# Patient Record
Sex: FEMALE | Race: WHITE | NOT HISPANIC OR LATINO | Employment: FULL TIME | URBAN - METROPOLITAN AREA
[De-identification: names, ages, dates, MRNs, and addresses within clinical notes are randomized per-mention and may not be internally consistent; named-entity substitution may affect disease eponyms.]

---

## 2017-01-11 RX ORDER — BUPROPION HYDROCHLORIDE 150 MG/1
TABLET ORAL
Qty: 90 TABLET | Refills: 3 | OUTPATIENT
Start: 2017-01-11

## 2017-01-11 RX ORDER — LOSARTAN POTASSIUM AND HYDROCHLOROTHIAZIDE 12.5; 1 MG/1; MG/1
TABLET ORAL
Qty: 30 TABLET | Refills: 6 | OUTPATIENT
Start: 2017-01-11

## 2017-01-13 ENCOUNTER — TELEPHONE (OUTPATIENT)
Dept: INTERNAL MEDICINE | Facility: CLINIC | Age: 57
End: 2017-01-13

## 2017-01-13 NOTE — TELEPHONE ENCOUNTER
LMOVM     Pt needs to schedule appt before we can refill medication   She was last seen 12/2015   . Per Dr Melton

## 2017-01-16 PROBLEM — K57.92 ACUTE DIVERTICULITIS: Status: ACTIVE | Noted: 2017-01-16

## 2017-01-17 ENCOUNTER — OFFICE VISIT (OUTPATIENT)
Dept: INTERNAL MEDICINE | Facility: CLINIC | Age: 57
End: 2017-01-17

## 2017-01-17 VITALS
BODY MASS INDEX: 29.42 KG/M2 | SYSTOLIC BLOOD PRESSURE: 144 MMHG | TEMPERATURE: 97.6 F | RESPIRATION RATE: 20 BRPM | HEART RATE: 96 BPM | WEIGHT: 171.38 LBS | DIASTOLIC BLOOD PRESSURE: 88 MMHG

## 2017-01-17 DIAGNOSIS — F41.1 GENERALIZED ANXIETY DISORDER: ICD-10-CM

## 2017-01-17 DIAGNOSIS — E03.9 ACQUIRED HYPOTHYROIDISM: ICD-10-CM

## 2017-01-17 DIAGNOSIS — Z11.59 NEED FOR HEPATITIS C SCREENING TEST: ICD-10-CM

## 2017-01-17 DIAGNOSIS — K21.9 GASTROESOPHAGEAL REFLUX DISEASE WITHOUT ESOPHAGITIS: ICD-10-CM

## 2017-01-17 DIAGNOSIS — I10 ESSENTIAL HYPERTENSION: Primary | ICD-10-CM

## 2017-01-17 DIAGNOSIS — E78.49 OTHER HYPERLIPIDEMIA: ICD-10-CM

## 2017-01-17 DIAGNOSIS — Z12.39 SCREENING FOR BREAST CANCER: ICD-10-CM

## 2017-01-17 PROBLEM — R74.8 ABNORMAL LIVER ENZYMES: Status: ACTIVE | Noted: 2017-01-17

## 2017-01-17 PROBLEM — R91.8 LUNG MASS: Status: ACTIVE | Noted: 2017-01-17

## 2017-01-17 PROBLEM — K57.30 DIVERTICULOSIS OF LARGE INTESTINE: Status: ACTIVE | Noted: 2017-01-17

## 2017-01-17 PROBLEM — E78.5 HYPERLIPIDEMIA: Status: ACTIVE | Noted: 2017-01-17

## 2017-01-17 PROBLEM — F41.9 ANXIETY DISORDER: Status: ACTIVE | Noted: 2017-01-17

## 2017-01-17 LAB
ALBUMIN SERPL-MCNC: 4.4 G/DL (ref 3.2–4.8)
ALBUMIN/GLOB SERPL: 1.4 G/DL (ref 1.5–2.5)
ALP SERPL-CCNC: 44 U/L (ref 25–100)
ALT SERPL W P-5'-P-CCNC: 82 U/L (ref 7–40)
ANION GAP SERPL CALCULATED.3IONS-SCNC: 17 MMOL/L (ref 3–11)
AST SERPL-CCNC: 61 U/L (ref 0–33)
BILIRUB SERPL-MCNC: 0.6 MG/DL (ref 0.3–1.2)
BUN BLD-MCNC: 18 MG/DL (ref 9–23)
BUN/CREAT SERPL: 25.7 (ref 7–25)
CALCIUM SPEC-SCNC: 9.9 MG/DL (ref 8.7–10.4)
CHLORIDE SERPL-SCNC: 104 MMOL/L (ref 99–109)
CHOLEST SERPL-MCNC: 284 MG/DL
CLARITY, POC: ABNORMAL
CO2 SERPL-SCNC: 25 MMOL/L (ref 20–31)
COLOR UR: YELLOW
CREAT BLD-MCNC: 0.7 MG/DL (ref 0.6–1.3)
EXPIRATION DATE: ABNORMAL
EXPIRATION DATE: NORMAL
GFR SERPL CREATININE-BSD FRML MDRD: 87 ML/MIN/1.73
GLOBULIN UR ELPH-MCNC: 3.1 GM/DL
GLUCOSE BLD-MCNC: 85 MG/DL (ref 70–100)
GLUCOSE UR STRIP-MCNC: NEGATIVE MG/DL
HCV AB SER DONR QL: NORMAL
HDLC SERPL-MCNC: 81 MG/DL
KETONES UR QL: NEGATIVE
LDLC SERPL CALC-MCNC: 168 MG/DL
LDLC/HDLC SERPL: 2.1 {RATIO}
LEUKOCYTE EST, POC: ABNORMAL
Lab: ABNORMAL
Lab: NORMAL
NITRITE UR-MCNC: NEGATIVE MG/ML
PH UR: 6 [PH] (ref 5–8)
POTASSIUM BLD-SCNC: 3.8 MMOL/L (ref 3.5–5.5)
PROT SERPL-MCNC: 7.5 G/DL (ref 5.7–8.2)
PROT UR STRIP-MCNC: NEGATIVE MG/DL
PROT/CREAT UR: 200 MG/G CREA
RBC # UR STRIP: ABNORMAL /UL
SODIUM BLD-SCNC: 146 MMOL/L (ref 132–146)
SP GR UR: 1.02 (ref 1–1.03)
T4 FREE SERPL-MCNC: 1.29 NG/DL (ref 0.89–1.76)
TRIGL SERPL-MCNC: 176 MG/DL
TSH SERPL DL<=0.05 MIU/L-ACNC: 2.63 MIU/ML (ref 0.35–5.35)

## 2017-01-17 PROCEDURE — 80053 COMPREHEN METABOLIC PANEL: CPT | Performed by: INTERNAL MEDICINE

## 2017-01-17 PROCEDURE — 81002 URINALYSIS NONAUTO W/O SCOPE: CPT | Performed by: INTERNAL MEDICINE

## 2017-01-17 PROCEDURE — 84439 ASSAY OF FREE THYROXINE: CPT | Performed by: INTERNAL MEDICINE

## 2017-01-17 PROCEDURE — 84443 ASSAY THYROID STIM HORMONE: CPT | Performed by: INTERNAL MEDICINE

## 2017-01-17 PROCEDURE — 80061 LIPID PANEL: CPT | Performed by: INTERNAL MEDICINE

## 2017-01-17 PROCEDURE — 86803 HEPATITIS C AB TEST: CPT | Performed by: INTERNAL MEDICINE

## 2017-01-17 PROCEDURE — 99214 OFFICE O/P EST MOD 30 MIN: CPT | Performed by: INTERNAL MEDICINE

## 2017-01-17 PROCEDURE — 36415 COLL VENOUS BLD VENIPUNCTURE: CPT | Performed by: INTERNAL MEDICINE

## 2017-01-17 RX ORDER — LEVOTHYROXINE SODIUM 0.07 MG/1
75 TABLET ORAL DAILY
Qty: 90 TABLET | Refills: 3 | Status: SHIPPED | OUTPATIENT
Start: 2017-01-17 | End: 2018-01-03 | Stop reason: SDUPTHER

## 2017-01-17 RX ORDER — BUPROPION HYDROCHLORIDE 150 MG/1
150 TABLET ORAL EVERY MORNING
Qty: 90 TABLET | Refills: 3 | Status: SHIPPED | OUTPATIENT
Start: 2017-01-17 | End: 2018-02-12 | Stop reason: SDUPTHER

## 2017-01-17 RX ORDER — LOSARTAN POTASSIUM AND HYDROCHLOROTHIAZIDE 12.5; 1 MG/1; MG/1
1 TABLET ORAL DAILY
Qty: 90 TABLET | Refills: 3 | Status: SHIPPED | OUTPATIENT
Start: 2017-01-17 | End: 2018-02-12 | Stop reason: SDUPTHER

## 2017-01-17 NOTE — PROGRESS NOTES
Subjective       Roz Randolph is a 56 y.o. female.     Chief Complaint   Patient presents with   • Hypertension     1 year follow up   fasting        History of Present Illness     6 month follow up for chronic medical conditons fasting  (last seen for routine follow up 9/4/15).     History of Present Illness  Primary Care Cardiac Diagnostic Constellation: The patient is here today for a follow-up visit.     Her Hypertension is primary, but stable.   The patient is adherent with her medication regimen. She denies medication side effects.   Medication(s): Losartan.   Her Hyperlipidemia has been unstable. Her LDL goal is 130 mg/dL, last LDL was 192 mg/dL and .   Medication(s): None.     Interval Events: The patient occasionally checksher blood pressure at home, and it has been normal.    .   Symptoms: Denies chest pain, denies intermittent leg claudication, denies dyspnea, denies lower extremity edema, denies exercise intolerance, denies visual impairment, denies muscle pain and denies muscle weakness.   Associated symptoms include stable fatigue and a recent 2 pounds weight gain,  but no palpitations, no syncope, no headache, no orthopnea, no PND, no polydipsia, no polyuria, no focal neurologic deficits, and no memory loss.    Lifestyle and Disease Management: Diet: She consumes a diverse and healthy diet. Diet problems: high in sugar. Weight Issues: She has weight concerns. Exercise: She exercises intermittently. She exercises 2 times per week for 30 minutes per session. Exercise includes walking.  Also golfs on weekends with nice weather.  Smoking: She does not use tobacco.     Anxiety Disorder (Follow-Up): The patient is being seen for follow-up of Anxiety. The patient reports doing well.   She has no comorbid illnesses.   Interval Events:  She has had no significant interval events.   Interval symptoms:  stable anxiety, denies difficulty concentrating, denies restlessness, denies panic attacks, denies  sleep disruption and denies depression.   Associated symptoms: no suicidal ideation.   Medication(s): Wellbutrin XL.   Medications:  the patient is adherent to her medication regimen, but she denies medication side effects.     Gastroesophageal Reflux Disease (Brief): The patient is being seen for a routine clinic follow-up of Gastroesophageal Reflux Disease.   Symptoms:  No abdominal pain, no heartburn, no acid regurgitation, no nausea, no vomiting, no sore throat, no dysphagia, no odynophagia, no hematemesis,  and no melena    Previous Evaluation: EGD 12/21/15 (small hiatal hernia, mild duodenitis, and concentric espohageal rings suggestive of esophsgitis- path c/w mild chronic esophagitis w/ increased eosinophils suggestive of reflux (Fadi).   Associated symptoms:  no anorexia, no early satiety, no bloating, no belching, no weight loss, no hoarseness, no cough and no wheezing.   Current treatment include Tums prn.     Hypothyroidism (Follow-Up): The patient is being seen for follow-up of Hypothyroidism.   The patient reports no change in the condition. She has had no significant interval events.   Interval symptoms:  2 pound weight gain, but denies cold intolerance, denies fatigue, denies weakness, denies constipation, denies dyspnea on exertion, denies trouble concentrating,  hair loss,  and dry skin.   Associated symptoms: no myalgias, no arthralgias, and no paresthesias.   Medications include levothyroxine (Synthroid).   Medications:  the patient is adherent to her medication regimen, but she denies medication side effects.            Current Outpatient Prescriptions on File Prior to Visit   Medication Sig Dispense Refill   • [DISCONTINUED] buPROPion XL (WELLBUTRIN XL) 150 MG 24 hr tablet TAKE ONE TABLET BY MOUTH EVERY DAY 30 tablet 0   • [DISCONTINUED] levothyroxine (SYNTHROID, LEVOTHROID) 75 MCG tablet TAKE ONE TABLET BY MOUTH EVERY DAY 30 tablet 5   • [DISCONTINUED] losartan-hydrochlorothiazide (HYZAAR)  "100-12.5 MG per tablet TAKE ONE TABLET BY MOUTH EVERY DAY 30 tablet 6   • [DISCONTINUED] sulfamethoxazole-trimethoprim (BACTRIM DS,SEPTRA DS) 800-160 MG per tablet Take 1 tablet by mouth 2 (Two) Times a Day. 20 tablet 0     No current facility-administered medications on file prior to visit.          The following portions of the patient's history were reviewed and updated as appropriate: allergies, current medications, past family history, past medical history, past social history, past surgical history and problem list.    Review of Systems   Constitutional: Positive for fatigue. Negative for unexpected weight change.   HENT: Negative for sore throat and voice change.    Eyes: Negative for visual disturbance.   Respiratory: Negative for cough, shortness of breath and wheezing.    Cardiovascular: Negative for chest pain, palpitations and leg swelling.        No PORTILLO, orthopnea, or claudication.   Gastrointestinal: Negative for abdominal pain, blood in stool, constipation, diarrhea, nausea and vomiting.        Complains of \"acid reflux\", resolves with Tums. No heartburn, dysphagia, odynophagia, belching, or bloating.  No melena.   Endocrine: Negative for cold intolerance, heat intolerance, polydipsia and polyuria.   Musculoskeletal: Negative for arthralgias and myalgias.   Neurological: Negative for dizziness, syncope, light-headedness and headaches.        No memory issues. No paresthesias.   Psychiatric/Behavioral: Negative for decreased concentration, sleep disturbance and suicidal ideas. The patient is nervous/anxious (stable).         Denies depression.         Objective       Blood pressure 144/88, pulse 96, temperature 97.6 °F (36.4 °C), temperature source Temporal Artery , resp. rate 20, weight 171 lb 6 oz (77.7 kg).      Physical Exam   Constitutional:   Overweight.   Neck: Normal range of motion. Neck supple. Carotid bruit is not present. No thyromegaly present.   Cardiovascular: Normal rate, regular " rhythm, normal heart sounds and intact distal pulses.  Exam reveals no gallop and no friction rub.    No murmur heard.  No peripheral edema.   Pulmonary/Chest: Effort normal and breath sounds normal.   Abdominal: Soft. Bowel sounds are normal. She exhibits no distension, no abdominal bruit and no mass. There is no hepatosplenomegaly. There is no tenderness.   Psychiatric: She has a normal mood and affect.   Nursing note and vitals reviewed.       Results for orders placed or performed in visit on 01/17/17   POC Lipid Panel   Result Value Ref Range    Total Cholesterol 284 mg/dL    Triglycerides 176 mg/dL    HDL Cholesterol 81 mg/dL    LDL Cholesterol  168 mg/dL    Lot Number [id number]     Expiration Date 8-31-17     LDL/HDL Ratio 2.10    POC Urinalysis Dipstick, Multipro   Result Value Ref Range    Color Yellow Yellow, Straw, Dark Yellow, María Elena    Clarity, UA Hazy (A) Clear    Glucose, UA Negative Negative, 1000 mg/dL (3+) mg/dL    Ketones, UA Negative Negative    Specific Gravity  1.020 1.005 - 1.030    Blood, UA Trace (A) Negative    pH, Urine 6.0 5.0 - 8.0    Protein, POC Negative Negative mg/dL    Leukocytes Trace (A) Negative    Nitrite, UA Negative Negative    Protein/Creatinine Ratio, Urine 200.0 mg/G Crea    Lot Number [id number]     Expiration Date 5-17          Assessment / Plan:    Diagnoses and all orders for this visit:    Essential hypertension  -     Comprehensive Metabolic Panel  -     POC Urinalysis Dipstick, Multipro    Other hyperlipidemia  -     POC Lipid Panel    Gastroesophageal reflux disease without esophagitis    Generalized anxiety disorder    Screening for breast cancer  -     Mammo Screening Bilateral With CAD; Future    Need for hepatitis C screening test  -     Hepatitis C Antibody    Acquired hypothyroidism  -     TSH  -     T4, Free    Return in about 6 months (around 7/17/2017) for Recheck-HTN fasting.

## 2017-01-17 NOTE — MR AVS SNAPSHOT
Roz Randolph   1/17/2017 10:30 AM   Office Visit    Provider:  Maia Melton MD   Department:  [facility]   Dept Phone:  [phone]                Your Full Care Plan              Today's Medication Changes          These changes are accurate as of: 1/17/17 11:59 AM.  If you have any questions, ask your nurse or doctor.               Medication(s)that have changed:     buPROPion  MG 24 hr tablet   Commonly known as:  WELLBUTRIN XL   Take 1 tablet by mouth Every Morning.   What changed:  See the new instructions.   Changed by:  Maia Melton MD       levothyroxine 75 MCG tablet   Commonly known as:  SYNTHROID, LEVOTHROID   Take 1 tablet by mouth Daily.   What changed:  See the new instructions.   Changed by:  Maia Melton MD       losartan-hydrochlorothiazide 100-12.5 MG per tablet   Commonly known as:  HYZAAR   Take 1 tablet by mouth Daily.   What changed:  See the new instructions.   Changed by:  Maia Melton MD         Stop taking medication(s)listed here:     sulfamethoxazole-trimethoprim 800-160 MG per tablet   Commonly known as:  BACTRIM DS,SEPTRA DS   Stopped by:  Maia Melton MD                Where to Get Your Medications      These medications were sent to [facility] - [address] [phone]  - [phone] [address] [address]     Phone:  [phone]     buPROPion  MG 24 hr tablet    levothyroxine 75 MCG tablet    losartan-hydrochlorothiazide 100-12.5 MG per tablet                  Your Updated Medication List          This list is accurate as of: 1/17/17 11:59 AM.  Always use your most recent med list.                buPROPion  MG 24 hr tablet   Commonly known as:  WELLBUTRIN XL   Take 1 tablet by mouth Every Morning.       levothyroxine 75 MCG tablet   Commonly known as:  SYNTHROID, LEVOTHROID   Take 1 tablet by mouth Daily.       losartan-hydrochlorothiazide 100-12.5 MG per tablet   Commonly known as:  HYZAAR   Take 1 tablet by mouth Daily.               We Performed the Following     Comprehensive Metabolic Panel     Hepatitis C Antibody     POC Lipid Panel     POC Urinalysis Dipstick, Multipro     T4, Free     TSH       You Were Diagnosed With        Codes Comments    Essential hypertension    -  Primary ICD-10-CM: I10  ICD-9-CM: 401.9     Other hyperlipidemia     ICD-10-CM: E78.4  ICD-9-CM: 272.4     Gastroesophageal reflux disease without esophagitis     ICD-10-CM: K21.9  ICD-9-CM: 530.81     Generalized anxiety disorder     ICD-10-CM: F41.1  ICD-9-CM: 300.02     Screening for breast cancer     ICD-10-CM: Z12.39  ICD-9-CM: V76.10     Need for hepatitis C screening test     ICD-10-CM: Z11.59  ICD-9-CM: V73.89     Acquired hypothyroidism     ICD-10-CM: E03.9  ICD-9-CM: 244.9       Instructions     None    Patient Instructions History      [employer] Signup     [email or web address] allows you to send messages to your doctor, view your test results, renew your prescriptions, schedule appointments, and more. To sign up, go to [email or web address] and click on the Sign Up Now link in the New User? box. Enter your [employer] Activation Code exactly as it appears below along with the last four digits of your Social Security Number and your Date of Birth () to complete the sign-up process. If you do not sign up before the expiration date, you must request a new code.    [employer] Activation Code: [email or web address]  Expires: 2017  5:36 AM    If you have questions, you can email [email or web address] or call [phone] to talk to our [employer] staff. Remember, [employer] is NOT to be used for urgent needs. For medical emergencies, dial 911.               Other Info from Your Visit           Allergies     Augmentin [Amoxicillin-pot Clavulanate]      Morphine And Related  Rash    Tetracyclines & Related Allergy Rash, GI Intolerance       Reason for Visit     Hypertension 1 year follow up   fasting       Vital Signs     Blood Pressure Pulse Temperature Respirations Weight Body Mass Index    144/88 (BP Location: Right arm) 96 97.6 °F (36.4 °C) (Temporal Artery ) 20 171 lb 6 oz (77.7 kg) 29.42 kg/m2    Smoking Status                   Never Smoker           Problems and Diagnoses Noted     Abnormal liver enzymes    Acute diverticulitis    Anxiety disorder    Diverticulosis of colon    Acid reflux disease    High cholesterol or triglycerides    High blood pressure    Underactive thyroid    Lung mass    Breast cancer screening        Need for hepatitis C screening test          Results

## 2017-01-19 PROBLEM — K57.92 ACUTE DIVERTICULITIS: Status: RESOLVED | Noted: 2017-01-16 | Resolved: 2017-01-19

## 2017-01-19 PROBLEM — R91.1 LUNG NODULE: Status: ACTIVE | Noted: 2017-01-17

## 2017-01-20 ENCOUNTER — HOSPITAL ENCOUNTER (EMERGENCY)
Facility: HOSPITAL | Age: 57
Discharge: HOME | End: 2017-01-21
Attending: EMERGENCY MEDICINE | Admitting: EMERGENCY MEDICINE

## 2017-01-20 DIAGNOSIS — R10.9 ACUTE ABDOMINAL PAIN: Primary | ICD-10-CM

## 2017-01-20 DIAGNOSIS — K57.32 DIVERTICULITIS OF LARGE INTESTINE WITHOUT PERFORATION OR ABSCESS WITHOUT BLEEDING: ICD-10-CM

## 2017-01-20 DIAGNOSIS — R31.9 HEMATURIA: ICD-10-CM

## 2017-01-20 LAB
ALBUMIN SERPL-MCNC: 4.3 G/DL (ref 3.2–4.8)
ALBUMIN/GLOB SERPL: 1.4 G/DL (ref 1.5–2.5)
ALP SERPL-CCNC: 41 U/L (ref 25–100)
ALT SERPL W P-5'-P-CCNC: 136 U/L (ref 7–40)
ANION GAP SERPL CALCULATED.3IONS-SCNC: 16 MMOL/L (ref 3–11)
AST SERPL-CCNC: 171 U/L (ref 0–33)
BASOPHILS # BLD AUTO: 0.01 10*3/MM3 (ref 0–0.2)
BASOPHILS NFR BLD AUTO: 0.1 % (ref 0–1)
BILIRUB SERPL-MCNC: 0.5 MG/DL (ref 0.3–1.2)
BUN BLD-MCNC: 11 MG/DL (ref 9–23)
BUN/CREAT SERPL: 13.8 (ref 7–25)
CALCIUM SPEC-SCNC: 9 MG/DL (ref 8.7–10.4)
CHLORIDE SERPL-SCNC: 104 MMOL/L (ref 99–109)
CO2 SERPL-SCNC: 24 MMOL/L (ref 20–31)
CREAT BLD-MCNC: 0.8 MG/DL (ref 0.6–1.3)
DEPRECATED RDW RBC AUTO: 46.6 FL (ref 37–54)
EOSINOPHIL # BLD AUTO: 0.18 10*3/MM3 (ref 0.1–0.3)
EOSINOPHIL NFR BLD AUTO: 1.9 % (ref 0–3)
ERYTHROCYTE [DISTWIDTH] IN BLOOD BY AUTOMATED COUNT: 12.4 % (ref 11.3–14.5)
GFR SERPL CREATININE-BSD FRML MDRD: 75 ML/MIN/1.73
GLOBULIN UR ELPH-MCNC: 3.1 GM/DL
GLUCOSE BLD-MCNC: 103 MG/DL (ref 70–100)
HCT VFR BLD AUTO: 41.2 % (ref 34.5–44)
HGB BLD-MCNC: 14 G/DL (ref 11.5–15.5)
IMM GRANULOCYTES # BLD: 0.02 10*3/MM3 (ref 0–0.03)
IMM GRANULOCYTES NFR BLD: 0.2 % (ref 0–0.6)
LIPASE SERPL-CCNC: 29 U/L (ref 6–51)
LYMPHOCYTES # BLD AUTO: 1.96 10*3/MM3 (ref 0.6–4.8)
LYMPHOCYTES NFR BLD AUTO: 21.1 % (ref 24–44)
MCH RBC QN AUTO: 34.7 PG (ref 27–31)
MCHC RBC AUTO-ENTMCNC: 34 G/DL (ref 32–36)
MCV RBC AUTO: 102.2 FL (ref 80–99)
MONOCYTES # BLD AUTO: 0.95 10*3/MM3 (ref 0–1)
MONOCYTES NFR BLD AUTO: 10.2 % (ref 0–12)
NEUTROPHILS # BLD AUTO: 6.16 10*3/MM3 (ref 1.5–8.3)
NEUTROPHILS NFR BLD AUTO: 66.5 % (ref 41–71)
PLATELET # BLD AUTO: 225 10*3/MM3 (ref 150–450)
PMV BLD AUTO: 9.9 FL (ref 6–12)
POTASSIUM BLD-SCNC: 3.4 MMOL/L (ref 3.5–5.5)
PROT SERPL-MCNC: 7.4 G/DL (ref 5.7–8.2)
RBC # BLD AUTO: 4.03 10*6/MM3 (ref 3.89–5.14)
SODIUM BLD-SCNC: 144 MMOL/L (ref 132–146)
WBC NRBC COR # BLD: 9.28 10*3/MM3 (ref 3.5–10.8)

## 2017-01-20 PROCEDURE — 80053 COMPREHEN METABOLIC PANEL: CPT | Performed by: EMERGENCY MEDICINE

## 2017-01-20 PROCEDURE — 99284 EMERGENCY DEPT VISIT MOD MDM: CPT

## 2017-01-20 PROCEDURE — 83690 ASSAY OF LIPASE: CPT | Performed by: EMERGENCY MEDICINE

## 2017-01-20 PROCEDURE — 85025 COMPLETE CBC W/AUTO DIFF WBC: CPT | Performed by: EMERGENCY MEDICINE

## 2017-01-20 PROCEDURE — 36415 COLL VENOUS BLD VENIPUNCTURE: CPT

## 2017-01-20 RX ORDER — LEVOFLOXACIN 5 MG/ML
500 INJECTION, SOLUTION INTRAVENOUS ONCE
Status: COMPLETED | OUTPATIENT
Start: 2017-01-20 | End: 2017-01-21

## 2017-01-20 RX ORDER — SODIUM CHLORIDE 0.9 % (FLUSH) 0.9 %
10 SYRINGE (ML) INJECTION AS NEEDED
Status: DISCONTINUED | OUTPATIENT
Start: 2017-01-20 | End: 2017-01-21 | Stop reason: HOSPADM

## 2017-01-20 RX ORDER — FENTANYL CITRATE 50 UG/ML
25 INJECTION, SOLUTION INTRAMUSCULAR; INTRAVENOUS
Status: DISCONTINUED | OUTPATIENT
Start: 2017-01-20 | End: 2017-01-21 | Stop reason: HOSPADM

## 2017-01-20 RX ORDER — SODIUM CHLORIDE 9 MG/ML
125 INJECTION, SOLUTION INTRAVENOUS CONTINUOUS
Status: DISCONTINUED | OUTPATIENT
Start: 2017-01-20 | End: 2017-01-21 | Stop reason: HOSPADM

## 2017-01-20 RX ORDER — ONDANSETRON 2 MG/ML
4 INJECTION INTRAMUSCULAR; INTRAVENOUS ONCE
Status: COMPLETED | OUTPATIENT
Start: 2017-01-20 | End: 2017-01-21

## 2017-01-21 ENCOUNTER — APPOINTMENT (OUTPATIENT)
Dept: CT IMAGING | Facility: HOSPITAL | Age: 57
End: 2017-01-21

## 2017-01-21 VITALS
BODY MASS INDEX: 27.31 KG/M2 | TEMPERATURE: 98.6 F | HEIGHT: 64 IN | RESPIRATION RATE: 18 BRPM | WEIGHT: 160 LBS | OXYGEN SATURATION: 96 % | SYSTOLIC BLOOD PRESSURE: 140 MMHG | DIASTOLIC BLOOD PRESSURE: 80 MMHG | HEART RATE: 80 BPM

## 2017-01-21 LAB
BACTERIA UR QL AUTO: ABNORMAL /HPF
BILIRUB UR QL STRIP: ABNORMAL
CLARITY UR: CLEAR
COLOR UR: ABNORMAL
D-LACTATE SERPL-SCNC: 0.8 MMOL/L (ref 0.5–2)
GLUCOSE UR STRIP-MCNC: NEGATIVE MG/DL
HGB UR QL STRIP.AUTO: NEGATIVE
HOLD SPECIMEN: NORMAL
HOLD SPECIMEN: NORMAL
HYALINE CASTS UR QL AUTO: ABNORMAL /LPF
KETONES UR QL STRIP: NEGATIVE
LEUKOCYTE ESTERASE UR QL STRIP.AUTO: NEGATIVE
NITRITE UR QL STRIP: NEGATIVE
PH UR STRIP.AUTO: 6 [PH] (ref 5–8)
PROT UR QL STRIP: ABNORMAL
RBC # UR: ABNORMAL /HPF
REF LAB TEST METHOD: ABNORMAL
SP GR UR STRIP: 1.03 (ref 1–1.03)
SQUAMOUS #/AREA URNS HPF: ABNORMAL /HPF
UROBILINOGEN UR QL STRIP: ABNORMAL
WBC UR QL AUTO: ABNORMAL /HPF
WHOLE BLOOD HOLD SPECIMEN: NORMAL
WHOLE BLOOD HOLD SPECIMEN: NORMAL

## 2017-01-21 PROCEDURE — 96375 TX/PRO/DX INJ NEW DRUG ADDON: CPT

## 2017-01-21 PROCEDURE — 74176 CT ABD & PELVIS W/O CONTRAST: CPT

## 2017-01-21 PROCEDURE — 25010000002 LEVOFLOXACIN PER 250 MG: Performed by: EMERGENCY MEDICINE

## 2017-01-21 PROCEDURE — 81001 URINALYSIS AUTO W/SCOPE: CPT | Performed by: EMERGENCY MEDICINE

## 2017-01-21 PROCEDURE — 96365 THER/PROPH/DIAG IV INF INIT: CPT

## 2017-01-21 PROCEDURE — 96368 THER/DIAG CONCURRENT INF: CPT

## 2017-01-21 PROCEDURE — 83605 ASSAY OF LACTIC ACID: CPT | Performed by: EMERGENCY MEDICINE

## 2017-01-21 PROCEDURE — 25010000002 ONDANSETRON PER 1 MG: Performed by: EMERGENCY MEDICINE

## 2017-01-21 PROCEDURE — 87040 BLOOD CULTURE FOR BACTERIA: CPT | Performed by: EMERGENCY MEDICINE

## 2017-01-21 PROCEDURE — 25010000002 FENTANYL CITRATE (PF) 100 MCG/2ML SOLUTION: Performed by: EMERGENCY MEDICINE

## 2017-01-21 PROCEDURE — 96361 HYDRATE IV INFUSION ADD-ON: CPT

## 2017-01-21 RX ORDER — METRONIDAZOLE 250 MG/1
250 TABLET ORAL 3 TIMES DAILY
Qty: 15 TABLET | Refills: 0 | Status: SHIPPED | OUTPATIENT
Start: 2017-01-21 | End: 2017-01-26

## 2017-01-21 RX ORDER — CIPROFLOXACIN 500 MG/1
500 TABLET, FILM COATED ORAL 2 TIMES DAILY
Qty: 10 TABLET | Refills: 0 | Status: SHIPPED | OUTPATIENT
Start: 2017-01-21 | End: 2017-01-26

## 2017-01-21 RX ORDER — PROMETHAZINE HYDROCHLORIDE 25 MG/1
25 TABLET ORAL EVERY 8 HOURS PRN
Qty: 15 TABLET | Refills: 0 | OUTPATIENT
Start: 2017-01-21 | End: 2017-12-08

## 2017-01-21 RX ORDER — DICYCLOMINE HCL 20 MG
20 TABLET ORAL EVERY 6 HOURS PRN
Qty: 20 TABLET | Refills: 0 | Status: SHIPPED | OUTPATIENT
Start: 2017-01-21 | End: 2018-01-03

## 2017-01-21 RX ADMIN — LEVOFLOXACIN 500 MG: 5 INJECTION, SOLUTION INTRAVENOUS at 01:13

## 2017-01-21 RX ADMIN — SODIUM CHLORIDE 500 ML: 9 INJECTION, SOLUTION INTRAVENOUS at 01:13

## 2017-01-21 RX ADMIN — SODIUM CHLORIDE 125 ML/HR: 9 INJECTION, SOLUTION INTRAVENOUS at 01:13

## 2017-01-21 RX ADMIN — ONDANSETRON 4 MG: 2 INJECTION INTRAMUSCULAR; INTRAVENOUS at 00:58

## 2017-01-21 RX ADMIN — SODIUM CHLORIDE 1000 ML: 9 INJECTION, SOLUTION INTRAVENOUS at 00:56

## 2017-01-21 RX ADMIN — FENTANYL CITRATE 25 MCG: 50 INJECTION, SOLUTION INTRAMUSCULAR; INTRAVENOUS at 01:02

## 2017-01-21 RX ADMIN — METRONIDAZOLE 500 MG: 500 INJECTION, SOLUTION INTRAVENOUS at 01:51

## 2017-01-21 NOTE — DISCHARGE INSTRUCTIONS
Low fiber diet for 1-2 weeks then transfer to a high fiber diet  Hypertension  Hypertension, commonly called high blood pressure, is when the force of blood pumping through your arteries is too strong. Your arteries are the blood vessels that carry blood from your heart throughout your body. A blood pressure reading consists of a higher number over a lower number, such as 110/72. The higher number (systolic) is the pressure inside your arteries when your heart pumps. The lower number (diastolic) is the pressure inside your arteries when your heart relaxes. Ideally you want your blood pressure below 120/80.  Hypertension forces your heart to work harder to pump blood. Your arteries may become narrow or stiff. Having untreated or uncontrolled hypertension can cause heart attack, stroke, kidney disease, and other problems.  RISK FACTORS  Some risk factors for high blood pressure are controllable. Others are not.   Risk factors you cannot control include:   · Race. You may be at higher risk if you are .  · Age. Risk increases with age.  · Gender. Men are at higher risk than women before age 45 years. After age 65, women are at higher risk than men.  Risk factors you can control include:  · Not getting enough exercise or physical activity.  · Being overweight.  · Getting too much fat, sugar, calories, or salt in your diet.  · Drinking too much alcohol.  SIGNS AND SYMPTOMS  Hypertension does not usually cause signs or symptoms. Extremely high blood pressure (hypertensive crisis) may cause headache, anxiety, shortness of breath, and nosebleed.  DIAGNOSIS  To check if you have hypertension, your health care provider will measure your blood pressure while you are seated, with your arm held at the level of your heart. It should be measured at least twice using the same arm. Certain conditions can cause a difference in blood pressure between your right and left arms. A blood pressure reading that is higher  than normal on one occasion does not mean that you need treatment. If it is not clear whether you have high blood pressure, you may be asked to return on a different day to have your blood pressure checked again. Or, you may be asked to monitor your blood pressure at home for 1 or more weeks.  TREATMENT  Treating high blood pressure includes making lifestyle changes and possibly taking medicine. Living a healthy lifestyle can help lower high blood pressure. You may need to change some of your habits.  Lifestyle changes may include:  · Following the DASH diet. This diet is high in fruits, vegetables, and whole grains. It is low in salt, red meat, and added sugars.  · Keep your sodium intake below 2,300 mg per day.  · Getting at least 30-45 minutes of aerobic exercise at least 4 times per week.  · Losing weight if necessary.  · Not smoking.  · Limiting alcoholic beverages.  · Learning ways to reduce stress.  Your health care provider may prescribe medicine if lifestyle changes are not enough to get your blood pressure under control, and if one of the following is true:  · You are 18-59 years of age and your systolic blood pressure is above 140.  · You are 60 years of age or older, and your systolic blood pressure is above 150.  · Your diastolic blood pressure is above 90.  · You have diabetes, and your systolic blood pressure is over 140 or your diastolic blood pressure is over 90.  · You have kidney disease and your blood pressure is above 140/90.  · You have heart disease and your blood pressure is above 140/90.  Your personal target blood pressure may vary depending on your medical conditions, your age, and other factors.  HOME CARE INSTRUCTIONS  · Have your blood pressure rechecked as directed by your health care provider.    · Take medicines only as directed by your health care provider. Follow the directions carefully. Blood pressure medicines must be taken as prescribed. The medicine does not work as well when  you skip doses. Skipping doses also puts you at risk for problems.  · Do not smoke.    · Monitor your blood pressure at home as directed by your health care provider.   SEEK MEDICAL CARE IF:   · You think you are having a reaction to medicines taken.  · You have recurrent headaches or feel dizzy.  · You have swelling in your ankles.  · You have trouble with your vision.  SEEK IMMEDIATE MEDICAL CARE IF:  · You develop a severe headache or confusion.  · You have unusual weakness, numbness, or feel faint.  · You have severe chest or abdominal pain.  · You vomit repeatedly.  · You have trouble breathing.  MAKE SURE YOU:   · Understand these instructions.  · Will watch your condition.  · Will get help right away if you are not doing well or get worse.     This information is not intended to replace advice given to you by your health care provider. Make sure you discuss any questions you have with your health care provider.     Document Released: 12/18/2006 Document Revised: 05/03/2016 Document Reviewed: 10/10/2014  [employer] Patient Education ©2016 [employer]

## 2017-01-21 NOTE — ED PROVIDER NOTES
Subjective   HPI Comments: Roz Randolph is a 56 y.o. female presenting to the ED with c/o abdominal pain. Mrs. Randolph reports having abdominal pain and nausea/vomiting over the past three days. She also reports having a fever with this, and presents with a temperature of 99.2. Mrs. Randolph has a history of diverticulitis, and reports having 6-8 episodes of it over the past several years. The fever was improved with the use of ibuprofen. Additional constipation. Denies any diarrhea, hematochezia, hematemesis, dysuria, or chills. No other complaints at this time.    Patient is a 56 y.o. female presenting with abdominal pain.   History provided by:  Patient  Abdominal Pain   Pain location:  Generalized  Pain radiates to:  Does not radiate  Pain severity:  Moderate  Onset quality:  Sudden  Duration:  3 days  Timing:  Constant  Progression:  Worsening  Chronicity:  Recurrent  Relieved by:  Nothing  Worsened by:  Nothing  Ineffective treatments:  None tried  Associated symptoms: constipation, fever, nausea and vomiting    Associated symptoms: no chest pain, no chills, no cough, no diarrhea, no dysuria, no hematemesis, no hematochezia and no shortness of breath        Review of Systems   Constitutional: Positive for fever. Negative for chills.   HENT: Negative for rhinorrhea.    Respiratory: Negative for cough and shortness of breath.    Cardiovascular: Negative for chest pain and leg swelling.   Gastrointestinal: Positive for abdominal pain, constipation, nausea and vomiting. Negative for blood in stool, diarrhea, hematemesis and hematochezia.   Genitourinary: Negative for dysuria.   Musculoskeletal: Negative for back pain, myalgias and neck pain.   Skin: Negative for color change.   Neurological: Negative for dizziness, syncope, weakness and light-headedness.   All other systems reviewed and are negative.      Past Medical History   Diagnosis Date   • Acute diverticulitis 09/2013     sigmoid colon dx by CT (also  episodes 6/14, 9/14, and 12/14)   • Closed fracture of distal phalanx or phalanges of hand 11/2013     Of the fourth finger, right   • Closed head injury 06/2014     CT negative   • Heart murmur    • History of esophagogastroduodenoscopy (EGD) 12/21/2015     small hiatal hernia, mild duodenitis, and concentric espohageal rings suggestive of esophsgitis- path c/w mild chronic esophagitis w/ increased eosinophils suggestive of reflux (Schnider)   • History of varicella    Gastroesophageal Reflux Disease (Brief): The patient is being seen for a routine clinic follow-up of Gastroesophageal Reflux Disease.   Symptoms: No abdominal pain, no heartburn, no acid regurgitation, no nausea, no vomiting, no sore throat, no dysphagia, no odynophagia, no hematemesis,  and no melena    Previous Evaluation: EGD 12/21/15 (small hiatal hernia, mild duodenitis, and concentric espohageal rings suggestive of esophsgitis- path c/w mild chronic esophagitis w/ increased eosinophils suggestive of reflux (Schnider).  Associated symptoms: no anorexia, no early satiety, no bloating, no belching, no weight loss, no hoarseness, no cough and no wheezing.   Current treatment include Tums prn.      Hypothyroidism (Follow-Up): The patient is being seen for follow-up of Hypothyroidism.   The patient reports no change in the condition. She has had no significant interval events.   Interval symptoms: 2 pound weight gain, but denies cold intolerance, denies fatigue, denies weakness, denies constipation, denies dyspnea on exertion, denies trouble concentrating, hair loss,  and dry skin.   Associated symptoms: no myalgias, no arthralgias, and no paresthesias.   Medications include levothyroxine (Synthroid).   Medications: the patient is adherent to her medication regimen, but she denies medication side effects.     CT Abdomen 12/6/14  IMPRESSION-      1. Scattered colonic diverticulosis, with minimal acute diverticulitis   involving the proximal sigmoid  colon, where there is minimal wall   thickening and adjacent associated inflammatory stranding.      2. Multiple loops of nondilated fluid and gas-filled small bowel and   colon, which is a nonspecific finding, but can be seen with enteritis   with diarrhea.      3. Incidental/non-acute findings are described above.    Allergies   Allergen Reactions   • Augmentin [Amoxicillin-Pot Clavulanate]    • Morphine And Related Rash   • Tetracyclines & Related Rash and GI Intolerance       Past Surgical History   Procedure Laterality Date   • Appendectomy  1952   • Cholecystectomy  1993   • Total abdominal hysterectomy with salpingo oophorectomy Bilateral 02/2004       Family History   Problem Relation Age of Onset   • Diabetes Mother    • Hypertension Mother    • Hypothyroidism Mother    • Coronary artery disease Father 47     MI age 47 and 65   • Hypertension Father    • Hypothyroidism Sister    • Breast cancer Maternal Aunt 60   • Diabetes Maternal Aunt    • Coronary artery disease Maternal Grandmother    • Diabetes Maternal Grandmother    • Colon cancer Paternal Grandmother    • Coronary artery disease Paternal Grandfather    • Hypothyroidism Sister        Social History     Social History   • Marital status:      Spouse name: N/A   • Number of children: N/A   • Years of education: N/A     Social History Main Topics   • Smoking status: Never Smoker   • Smokeless tobacco: None   • Alcohol use Yes      Comment: twice weekly   • Drug use: None   • Sexual activity: Not Asked     Other Topics Concern   • None     Social History Narrative         Objective   Physical Exam   Constitutional: She is oriented to person, place, and time. She appears well-developed and well-nourished. No distress.   Appears uncomfortable.   HENT:   Head: Normocephalic and atraumatic.   Eyes: Conjunctivae and EOM are normal.   Neck: Normal range of motion. Neck supple.   Cardiovascular: Regular rhythm, normal heart sounds and intact distal  pulses.  Tachycardia present.  Exam reveals no gallop and no friction rub.    No murmur heard.  Pulmonary/Chest: Effort normal and breath sounds normal. No respiratory distress. She exhibits no tenderness.   Abdominal: Soft. Bowel sounds are normal. There is tenderness (Mild diffuse tenderness, left more than right). There is no rebound and no guarding.   Musculoskeletal: Normal range of motion. She exhibits no edema or tenderness.   Neurological: She is alert and oriented to person, place, and time.   Skin: Skin is warm and dry. No erythema.   Psychiatric: She has a normal mood and affect. Her behavior is normal.   Nursing note and vitals reviewed.      Procedures         ED Course  ED Course                 Course of Care      Lab Results (last 24 hours)     Procedure Component Value Units Date/Time    CBC & Differential [[id number]] Collected:  01/20/17 2231    Specimen:  Blood Updated:  01/20/17 2256    Narrative:       The following orders were created for panel order CBC & Differential.  Procedure                               Abnormality         Status                     ---------                               -----------         ------                     CBC Auto Differential[[id number]]         Abnormal            Final result                 Please view results for these tests on the individual orders.    Comprehensive Metabolic Panel [[id number]]  (Abnormal) Collected:  01/20/17 2231    Specimen:  Blood Updated:  01/20/17 2303     Glucose 103 (H) mg/dL      BUN 11 mg/dL      Creatinine 0.80 mg/dL      Sodium 144 mmol/L      Potassium 3.4 (L) mmol/L      Chloride 104 mmol/L      CO2 24.0 mmol/L      Calcium 9.0 mg/dL      Total Protein 7.4 g/dL      Albumin 4.30 g/dL      ALT (SGPT) 136 (H) U/L      AST (SGOT) 171 (H) U/L      Alkaline Phosphatase 41 U/L      Total Bilirubin 0.5 mg/dL      eGFR Non African Amer 75 mL/min/1.73      Globulin 3.1 gm/dL      A/G Ratio 1.4 (L) g/dL      BUN/Creatinine Ratio 13.8       Anion Gap 16.0 (H) mmol/L     Narrative:       National [employer] Guidelines    Stage                           Description                             GFR                      1                               Normal or High                          90+  2                               Mild decrease                            60-89  3                               Moderate decrease                   30-59  4                               Severe decrease                       15-29  5                               Kidney failure                             <15    Lipase [[id number]]  (Normal) Collected:  01/20/17 2231    Specimen:  Blood Updated:  01/20/17 2303     Lipase 29 U/L     CBC Auto Differential [[id number]]  (Abnormal) Collected:  01/20/17 2231    Specimen:  Blood Updated:  01/20/17 2256     WBC 9.28 10*3/mm3      RBC 4.03 10*6/mm3      Hemoglobin 14.0 g/dL      Hematocrit 41.2 %      .2 (H) fL      MCH 34.7 (H) pg      MCHC 34.0 g/dL      RDW 12.4 %      RDW-SD 46.6 fl      MPV 9.9 fL      Platelets 225 10*3/mm3      Neutrophil % 66.5 %      Lymphocyte % 21.1 (L) %      Monocyte % 10.2 %      Eosinophil % 1.9 %      Basophil % 0.1 %      Immature Grans % 0.2 %      Neutrophils, Absolute 6.16 10*3/mm3      Lymphocytes, Absolute 1.96 10*3/mm3      Monocytes, Absolute 0.95 10*3/mm3      Eosinophils, Absolute 0.18 10*3/mm3      Basophils, Absolute 0.01 10*3/mm3      Immature Grans, Absolute 0.02 10*3/mm3     Blood Culture [[id number]] Collected:  01/21/17 0025    Specimen:  Blood from Arm, Right Updated:  01/21/17 0041    Blood Culture [[id number]] Collected:  01/21/17 0029    Specimen:  Blood from Hand, Right Updated:  01/21/17 0041    Lactic Acid, Plasma [[id number]]  (Normal) Collected:  01/21/17 0031    Specimen:  Blood Updated:  01/21/17 0054     Lactate 0.8 mmol/L       Falsely depressed results may occur on samples drawn from patients receiving N-Acetylcysteine (NAC) or Metamizole.        Urinalysis With / Culture If Indicated [[id number]]  (Abnormal) Collected:  01/21/17 0055    Specimen:  Urine from Urine, Clean Catch Updated:  01/21/17 0113     Color, UA Dark Yellow (A)      Appearance, UA Clear      pH, UA 6.0      Specific Gravity, UA 1.029      Glucose, UA Negative      Ketones, UA Negative      Bilirubin, UA Small (1+) (A)      Blood, UA Negative      Protein, UA 30 mg/dL (1+) (A)      Leuk Esterase, UA Negative      Nitrite, UA Negative      Urobilinogen, UA 1.0 E.U./dL     Urinalysis, Microscopic Only [[id number]]  (Abnormal) Collected:  01/21/17 0055    Specimen:  Urine from Urine, Clean Catch Updated:  01/21/17 0113     RBC, UA 3-6 (A) /HPF      WBC, UA 0-2 (A) /HPF      Bacteria, UA None Seen /HPF      Squamous Epithelial Cells, UA 3-6 (A) /HPF      Hyaline Casts, UA 0-6 /LPF      Methodology Automated Microscopy           Note: In addition to lab results from this visit, the labs listed above may include labs taken at another facility or during a different encounter within the last 24 hours. Please correlate lab times with ED admission and discharge times for further clarification of the services performed during this visit.    CT Abdomen Pelvis Without Contrast   Final Result   Abnormal      No definite acute abnormality demonstrated on noncontrast study.      Possible mild fecal stasis/constipation.  Diverticulosis with no CT evidence of    diverticulitis.      Additional nonacute findings as noted, radiographically stable.         THIS DOCUMENT HAS BEEN ELECTRONICALLY SIGNED BY MARIAN LYON MD          Vitals:    01/21/17 0033 01/21/17 0140 01/21/17 0145 01/21/17 0150   BP: 130/85 136/85 143/85 145/86   BP Location:       Patient Position:       Pulse: 85      Resp:       Temp:       TempSrc:       SpO2: 94% 95% 95% (!) 89%   Weight:       Height:           Medications   sodium chloride 0.9 % flush 10 mL (not administered)   sodium chloride 0.9 % infusion (125 mL/hr Intravenous New Bag  1/21/17 0113)   FentaNYL Citrate (PF) (SUBLIMAZE) injection 25 mcg (25 mcg Intravenous Given 1/21/17 0102)   sodium chloride 0.9 % bolus 1,000 mL (0 mL Intravenous Stopped 1/21/17 0205)   sodium chloride 0.9 % bolus 500 mL (0 mL Intravenous Stopped 1/21/17 0150)   ondansetron (ZOFRAN) injection 4 mg (4 mg Intravenous Given 1/21/17 0058)   levoFLOXacin (LEVAQUIN) 500 mg/100 mL D5W (premix) (LEVAQUIN) 500 mg (0 mg Intravenous Stopped 1/21/17 0205)   metroNIDAZOLE (FLAGYL) IVPB 500 mg (500 mg Intravenous New Bag 1/21/17 0151)       ECG/EMG Results (last 24 hours)     ** No results found for the last 24 hours. **              MDM  Number of Diagnoses or Management Options  Acute abdominal pain:   Diverticulitis of large intestine without perforation or abscess without bleeding:   Hematuria:   Diagnosis management comments:       All available studies with the patient and her  at the bedside.  They are reassuring.  She has no evidence of diverticular abscess or perforation.  My interpretation of the CT scan and her sigmoid she does have a little bit of stranding.    I see nothing however that would explain her reported 103 fever earlier this week.  Patient declined chest x-ray.  Her urine sediment little bit active and will need follow-up but no evidence of acute infection.    I've started the patient IV Levaquin and Flagyl.  I'll discharge her to home on the same antibiotics orally along with little Bentyl to take.  I talked her about dietary considerations with a low fiber diet for a week or so then back to high fiber diet.  I have her follow-up with her primary care doctor to review this visit follow-up on her urine and blood culture results.  Also her colorectal doctor to follow-up regarding her diverticulosis and diverticulitis.  She'll return to the ER if worsen anyway.    All are agreeable with the plan       Amount and/or Complexity of Data Reviewed  Clinical lab tests: reviewed  Tests in the radiology  section of CPT®: reviewed  Decide to obtain previous medical records or to obtain history from someone other than the patient: yes        Final diagnoses:   Acute abdominal pain   Diverticulitis of large intestine without perforation or abscess without bleeding   Hematuria   EMR Dragon/Transcription disclaimer:   Much of this encounter note is an electronic transcription/translation of spoken language to printed text. The electronic translation of spoken language may permit erroneous, or at times, nonsensical words or phrases to be inadvertently transcribed; Although I have reviewed the note for such errors, some may still exist.       Documentation assistance provided by regina Owen.  Information recorded by the regina was done at my direction and has been verified and validated by me.     Saeed Owen  01/20/17 8740       Saeed Owen  01/20/17 8500       Сергей Nunez MD  01/21/17 0784

## 2017-01-26 LAB
BACTERIA SPEC AEROBE CULT: NORMAL
BACTERIA SPEC AEROBE CULT: NORMAL

## 2017-10-17 ENCOUNTER — TELEPHONE (OUTPATIENT)
Dept: INTERNAL MEDICINE | Facility: CLINIC | Age: 57
End: 2017-10-17

## 2017-11-06 ENCOUNTER — OFFICE VISIT (OUTPATIENT)
Dept: ORTHOPEDIC SURGERY | Facility: CLINIC | Age: 57
End: 2017-11-06

## 2017-11-06 VITALS
WEIGHT: 171.6 LBS | BODY MASS INDEX: 28.59 KG/M2 | DIASTOLIC BLOOD PRESSURE: 98 MMHG | SYSTOLIC BLOOD PRESSURE: 169 MMHG | HEART RATE: 96 BPM | HEIGHT: 65 IN

## 2017-11-06 DIAGNOSIS — M79.674 GREAT TOE PAIN, RIGHT: Primary | ICD-10-CM

## 2017-11-06 PROCEDURE — 99204 OFFICE O/P NEW MOD 45 MIN: CPT | Performed by: ORTHOPAEDIC SURGERY

## 2017-11-06 NOTE — PROGRESS NOTES
[facility] [place] [facility] Note    Subjective     Chief Complaint   Patient presents with   • Right Foot - Pain        HPI      Roz Randolph is a 57 y.o. female.  She stubbed her right great toe 6 days ago.  It hurts with any movement is better with Advil.  Pain is 5 out of 10.  It is dull and stabbing.  She has pre-existing toe pain.        Past Medical History:   Diagnosis Date   • Acute diverticulitis 09/2013    sigmoid colon dx by CT (also episodes 6/14, 9/14, and 12/14)   • Closed fracture of distal phalanx or phalanges of hand 11/2013    Of the fourth finger, right   • Closed head injury 06/2014    CT negative   • Heart murmur    • History of esophagogastroduodenoscopy (EGD) 12/21/2015    small hiatal hernia, mild duodenitis, and concentric espohageal rings suggestive of esophsgitis- path c/w mild chronic esophagitis w/ increased eosinophils suggestive of reflux (Fadi)   • History of varicella       Past Surgical History:   Procedure Laterality Date   • APPENDECTOMY  1952   • CHOLECYSTECTOMY  1993   • TOTAL ABDOMINAL HYSTERECTOMY WITH SALPINGO OOPHORECTOMY Bilateral 02/2004      Family History   Problem Relation Age of Onset   • Diabetes Mother    • Hypertension Mother    • Hypothyroidism Mother    • Coronary artery disease Father 47     MI age 47 and 65   • Hypertension Father    • Hypothyroidism Sister    • Breast cancer Maternal Aunt 60   • Diabetes Maternal Aunt    • Coronary artery disease Maternal Grandmother    • Diabetes Maternal Grandmother    • Colon cancer Paternal Grandmother    • Coronary artery disease Paternal Grandfather    • Hypothyroidism Sister      Social History     Social History   • Marital status:      Spouse name: N/A   • Number of children: N/A   • Years of education: N/A     Occupational History   • Not on file.     Social History Main Topics   • Smoking status: Never Smoker   • Smokeless tobacco: Never Used   • Alcohol use Yes      Comment: twice weekly   •  "Drug use: No   • Sexual activity: Defer     Other Topics Concern   • Not on file     Social History Narrative      Current Outpatient Prescriptions on File Prior to Visit   Medication Sig Dispense Refill   • buPROPion XL (WELLBUTRIN XL) 150 MG 24 hr tablet Take 1 tablet by mouth Every Morning. 90 tablet 3   • dicyclomine (BENTYL) 20 MG tablet Take 1 tablet by mouth Every 6 (Six) Hours As Needed (Abdominal pain). 20 tablet 0   • levothyroxine (SYNTHROID, LEVOTHROID) 75 MCG tablet Take 1 tablet by mouth Daily. 90 tablet 3   • losartan-hydrochlorothiazide (HYZAAR) 100-12.5 MG per tablet Take 1 tablet by mouth Daily. 90 tablet 3   • meloxicam (MOBIC) 7.5 MG tablet Take 1 tablet by mouth Daily. 30 tablet 0   • promethazine (PHENERGAN) 25 MG tablet Take 1 tablet by mouth Every 8 (Eight) Hours As Needed for nausea or vomiting. 15 tablet 0     No current facility-administered medications on file prior to visit.       Allergies   Allergen Reactions   • Augmentin [Amoxicillin-Pot Clavulanate]    • Morphine And Related Rash   • Tetracyclines & Related Rash, GI Intolerance and Swelling        The following portions of the patient's history were reviewed and updated as appropriate: allergies, current medications, past family history, past medical history, past social history, past surgical history and problem list.    Review of Systems   Constitutional: Negative.    HENT: Negative.    Eyes: Negative.    Respiratory: Negative.    Cardiovascular: Negative.    Gastrointestinal: Negative.    Endocrine: Negative.    Genitourinary: Negative.    Musculoskeletal: Negative.         Joint Pain    Skin: Negative.    Allergic/Immunologic: Negative.    Neurological: Negative.    Hematological: Negative.    Psychiatric/Behavioral: Negative.         Objective      Physical Exam  /98  Pulse 96  Ht 65\" (165.1 cm)  Wt 171 lb 9.6 oz (77.8 kg)  BMI 28.56 kg/m2    Body mass index is 28.56 kg/(m^2).        GENERAL APPEARANCE: awake, alert & " oriented x 3, in no acute distress and well developed, well nourished  PSYCH: normal mood andaffect  LUNGS:  breathing nonlabored, no wheezing  EYES: sclera anicteric, pupils equal  CARDIOVASCULAR: palpable pulses dorsalis pedis, palpable posterior tibial bilaterally. Capillary refill less than 2 seconds  INTEGUMENTARY: skin intact, no clubbing, cyanosis  NEUROLOGIC:  Normal gait and balance            Ortho Exam  Peripheral Vascular:  Lower Extremity:  Inspection:  Right--rapid capillary refill  Palpation:  Dorsalis pedis pulse:   Right--normal    Neurologic  Sensory:    Light Touch:     Right foot:  Dorsal intact and plantar intact       Overall Assessment of Muscle Strength and Tone:  Lower Extremities:     Right:  Tibialis anterior--5/5    Gastroc soleus--5/5    EHL--5/5    FHL--5/5      Musculoskeletal  Lower Extremity  Ankle/Foot:  Inspection and Palpation:     Right:  Tenderness: Tender and swollen MTP joint great toe.  There is no sign of infection and compartments are soft.    Effusion:  None    Crepitus:  None       ROM:   Right:  Plantarflexion--50    Dorsiflexion--20    Inversion--10    Eversion--10        Instability:     Right:  Anterior drawer test--negative    Squeeze test--negative    Talar tilt test--negative        Imaging/Studies  Imaging Results (last 24 hours)     ** No results found for the last 24 hours. **      I reviewed the x-rays which show first MTP joint arthritis right foot    Assessment/Plan      Roz was seen today for pain.    Diagnoses and all orders for this visit:    Great toe pain, right      The plan will be continue the cast she will follow-up in 3 weeks to make sure she's better she will continue anti-inflammatories as well.    Medical Decision Making  Management Options : over-the-counter medicine  Data/Risk: radiology tests and independent visualization of imaging, lab tests, or EMG/NCV    Mando Seth MD  11/06/17  9:44 AM

## 2017-11-27 ENCOUNTER — OFFICE VISIT (OUTPATIENT)
Dept: ORTHOPEDIC SURGERY | Facility: CLINIC | Age: 57
End: 2017-11-27

## 2017-11-27 VITALS
BODY MASS INDEX: 27.66 KG/M2 | HEIGHT: 65 IN | WEIGHT: 166 LBS | SYSTOLIC BLOOD PRESSURE: 155 MMHG | HEART RATE: 101 BPM | DIASTOLIC BLOOD PRESSURE: 99 MMHG

## 2017-11-27 DIAGNOSIS — M79.674 GREAT TOE PAIN, RIGHT: Primary | ICD-10-CM

## 2017-11-27 PROCEDURE — 99212 OFFICE O/P EST SF 10 MIN: CPT | Performed by: ORTHOPAEDIC SURGERY

## 2017-11-27 NOTE — PROGRESS NOTES
[facility] – [place] [facility] Note    Subjective     Chief Complaint   Patient presents with   • Right Foot - Follow-up     3 week follow up: Right great toe pain        HPI      Roz Randolph is a 57 y.o. female.  She is follow-up the stubbing injury to her right great toe about 30 days ago.  It is worse with walking and better with rest.  Pain is 3 out of 10 aching.  It is slightly better since last visit.        Past Medical History:   Diagnosis Date   • Acute diverticulitis 09/2013    sigmoid colon dx by CT (also episodes 6/14, 9/14, and 12/14)   • Closed fracture of distal phalanx or phalanges of hand 11/2013    Of the fourth finger, right   • Closed head injury 06/2014    CT negative   • Heart murmur    • History of esophagogastroduodenoscopy (EGD) 12/21/2015    small hiatal hernia, mild duodenitis, and concentric espohageal rings suggestive of esophsgitis- path c/w mild chronic esophagitis w/ increased eosinophils suggestive of reflux (Fadi)   • History of varicella       Past Surgical History:   Procedure Laterality Date   • APPENDECTOMY  1952   • CHOLECYSTECTOMY  1993   • TOTAL ABDOMINAL HYSTERECTOMY WITH SALPINGO OOPHORECTOMY Bilateral 02/2004      Family History   Problem Relation Age of Onset   • Diabetes Mother    • Hypertension Mother    • Hypothyroidism Mother    • Coronary artery disease Father 47     MI age 47 and 65   • Hypertension Father    • Hypothyroidism Sister    • Breast cancer Maternal Aunt 60   • Diabetes Maternal Aunt    • Coronary artery disease Maternal Grandmother    • Diabetes Maternal Grandmother    • Colon cancer Paternal Grandmother    • Coronary artery disease Paternal Grandfather    • Hypothyroidism Sister      Social History     Social History   • Marital status:      Spouse name: N/A   • Number of children: N/A   • Years of education: N/A     Occupational History   • Not on file.     Social History Main Topics   • Smoking status: Never Smoker   • Smokeless  "tobacco: Never Used   • Alcohol use Yes      Comment: twice weekly   • Drug use: No   • Sexual activity: Defer     Other Topics Concern   • Not on file     Social History Narrative      Current Outpatient Prescriptions on File Prior to Visit   Medication Sig Dispense Refill   • buPROPion XL (WELLBUTRIN XL) 150 MG 24 hr tablet Take 1 tablet by mouth Every Morning. 90 tablet 3   • dicyclomine (BENTYL) 20 MG tablet Take 1 tablet by mouth Every 6 (Six) Hours As Needed (Abdominal pain). 20 tablet 0   • levothyroxine (SYNTHROID, LEVOTHROID) 75 MCG tablet Take 1 tablet by mouth Daily. 90 tablet 3   • losartan-hydrochlorothiazide (HYZAAR) 100-12.5 MG per tablet Take 1 tablet by mouth Daily. 90 tablet 3   • meloxicam (MOBIC) 7.5 MG tablet Take 1 tablet by mouth Daily. 30 tablet 0   • promethazine (PHENERGAN) 25 MG tablet Take 1 tablet by mouth Every 8 (Eight) Hours As Needed for nausea or vomiting. 15 tablet 0     No current facility-administered medications on file prior to visit.       Allergies   Allergen Reactions   • Augmentin [Amoxicillin-Pot Clavulanate]    • Morphine And Related Rash   • Tetracyclines & Related Rash, GI Intolerance and Swelling        The following portions of the patient's history were reviewed and updated as appropriate: allergies, current medications, past family history, past medical history, past social history, past surgical history and problem list.    Review of Systems   Constitutional: Negative.    HENT: Negative.    Eyes: Negative.    Respiratory: Negative.    Cardiovascular: Negative.    Gastrointestinal: Negative.    Endocrine: Negative.    Genitourinary: Negative.    Musculoskeletal: Positive for arthralgias.   Skin: Negative.    Allergic/Immunologic: Negative.    Neurological: Negative.    Hematological: Negative.    Psychiatric/Behavioral: Negative.         Objective      Physical Exam  /99  Pulse 101  Ht 65\" (165.1 cm)  Wt 166 lb (75.3 kg)  BMI 27.62 kg/m2    Body mass index " is 27.62 kg/(m^2).        GENERAL APPEARANCE: awake, alert & oriented x 3, in no acute distress and well developed, well nourished  PSYCH: normal mood andaffect  LUNGS:  breathing nonlabored, no wheezing  EYES: sclera anicteric, pupils equal  CARDIOVASCULAR: palpable pulses dorsalis pedis, palpable posterior tibial bilaterally. Capillary refill less than 2 seconds  INTEGUMENTARY: skin intact, no clubbing, cyanosis  NEUROLOGIC:  Normal gait and balance            Ortho Exam  Peripheral Vascular:  Lower Extremity:  Inspection:  Right--rapid capillary refill  Palpation:  Dorsalis pedis pulse:   Right--normal    Neurologic  Sensory:    Light Touch:     Right foot:  Dorsal intact and plantar intact       Overall Assessment of Muscle Strength and Tone:  Lower Extremities:     Right:  Tibialis anterior--5/5    Gastroc soleus--5/5    EHL--5/5    FHL--5/5      Musculoskeletal  Lower Extremity  Ankle/Foot:  Inspection and Palpation:     Right:  Tenderness: Right great toe at MTP joint    Swelling:none    Effusion:  None    Crepitus:  None       ROM:   Right:  Plantarflexion--50    Dorsiflexion--20    Inversion--10    Eversion--10        Instability:     Right:  Anterior drawer test--negative    Squeeze test--negative    Talar tilt test--negative        Imaging/Studies  Imaging Results (last 24 hours)     ** No results found for the last 24 hours. **          Assessment/Plan      Roz was seen today for follow-up.    Diagnoses and all orders for this visit:    Great toe pain, right      She will continue symptomatic treatment and follow-up in 1 month.    Medical Decision Making  Management Options : over-the-counter medicine      Mando Seth MD  11/27/17  10:15 AM

## 2017-12-06 ENCOUNTER — TRANSCRIBE ORDERS (OUTPATIENT)
Dept: ADMINISTRATIVE | Facility: HOSPITAL | Age: 57
End: 2017-12-06

## 2017-12-06 DIAGNOSIS — Z12.31 VISIT FOR SCREENING MAMMOGRAM: Primary | ICD-10-CM

## 2017-12-07 ENCOUNTER — HOSPITAL ENCOUNTER (OUTPATIENT)
Dept: MAMMOGRAPHY | Facility: HOSPITAL | Age: 57
Discharge: HOME | End: 2017-12-07
Attending: INTERNAL MEDICINE | Admitting: INTERNAL MEDICINE

## 2017-12-07 DIAGNOSIS — Z12.31 VISIT FOR SCREENING MAMMOGRAM: ICD-10-CM

## 2017-12-07 PROCEDURE — 77063 BREAST TOMOSYNTHESIS BI: CPT | Performed by: RADIOLOGY

## 2017-12-07 PROCEDURE — 77067 SCR MAMMO BI INCL CAD: CPT | Performed by: RADIOLOGY

## 2017-12-07 PROCEDURE — G0202 SCR MAMMO BI INCL CAD: HCPCS

## 2017-12-07 PROCEDURE — 77063 BREAST TOMOSYNTHESIS BI: CPT

## 2018-01-03 ENCOUNTER — OFFICE VISIT (OUTPATIENT)
Dept: INTERNAL MEDICINE | Facility: CLINIC | Age: 58
End: 2018-01-03

## 2018-01-03 VITALS
RESPIRATION RATE: 20 BRPM | HEART RATE: 80 BPM | SYSTOLIC BLOOD PRESSURE: 150 MMHG | WEIGHT: 169.25 LBS | DIASTOLIC BLOOD PRESSURE: 80 MMHG | TEMPERATURE: 99 F | BODY MASS INDEX: 28.16 KG/M2

## 2018-01-03 DIAGNOSIS — E03.9 ACQUIRED HYPOTHYROIDISM: ICD-10-CM

## 2018-01-03 DIAGNOSIS — K21.9 GASTROESOPHAGEAL REFLUX DISEASE WITHOUT ESOPHAGITIS: ICD-10-CM

## 2018-01-03 DIAGNOSIS — Z78.0 MENOPAUSE: ICD-10-CM

## 2018-01-03 DIAGNOSIS — E78.49 OTHER HYPERLIPIDEMIA: ICD-10-CM

## 2018-01-03 DIAGNOSIS — Z23 NEED FOR IMMUNIZATION AGAINST INFLUENZA: ICD-10-CM

## 2018-01-03 DIAGNOSIS — F41.1 GENERALIZED ANXIETY DISORDER: ICD-10-CM

## 2018-01-03 DIAGNOSIS — I10 ESSENTIAL HYPERTENSION: Primary | ICD-10-CM

## 2018-01-03 DIAGNOSIS — R91.1 LUNG NODULE: ICD-10-CM

## 2018-01-03 LAB
ALBUMIN SERPL-MCNC: 4.5 G/DL (ref 3.2–4.8)
ALBUMIN/GLOB SERPL: 1.6 G/DL (ref 1.5–2.5)
ALP SERPL-CCNC: 44 U/L (ref 25–100)
ALT SERPL W P-5'-P-CCNC: 64 U/L (ref 7–40)
ANION GAP SERPL CALCULATED.3IONS-SCNC: 14 MMOL/L (ref 3–11)
ARTICHOKE IGE QN: 188 MG/DL (ref 0–130)
AST SERPL-CCNC: 52 U/L (ref 0–33)
BASOPHILS # BLD AUTO: 0.04 10*3/MM3 (ref 0–0.2)
BASOPHILS NFR BLD AUTO: 0.5 % (ref 0–1)
BILIRUB SERPL-MCNC: 0.6 MG/DL (ref 0.3–1.2)
BUN BLD-MCNC: 16 MG/DL (ref 9–23)
BUN/CREAT SERPL: 17.8 (ref 7–25)
CALCIUM SPEC-SCNC: 9.5 MG/DL (ref 8.7–10.4)
CHLORIDE SERPL-SCNC: 99 MMOL/L (ref 99–109)
CHOLEST SERPL-MCNC: 254 MG/DL (ref 0–200)
CLARITY, POC: CLEAR
CO2 SERPL-SCNC: 26 MMOL/L (ref 20–31)
COLOR UR: YELLOW
CREAT BLD-MCNC: 0.9 MG/DL (ref 0.6–1.3)
DEPRECATED RDW RBC AUTO: 44.3 FL (ref 37–54)
EOSINOPHIL # BLD AUTO: 0.23 10*3/MM3 (ref 0–0.3)
EOSINOPHIL NFR BLD AUTO: 2.8 % (ref 0–3)
ERYTHROCYTE [DISTWIDTH] IN BLOOD BY AUTOMATED COUNT: 12.2 % (ref 11.3–14.5)
EXPIRATION DATE: NORMAL
GFR SERPL CREATININE-BSD FRML MDRD: 65 ML/MIN/1.73
GLOBULIN UR ELPH-MCNC: 2.9 GM/DL
GLUCOSE BLD-MCNC: 93 MG/DL (ref 70–100)
GLUCOSE UR STRIP-MCNC: NEGATIVE MG/DL
HCT VFR BLD AUTO: 41.7 % (ref 34.5–44)
HDLC SERPL-MCNC: 67 MG/DL (ref 40–60)
HGB BLD-MCNC: 14.1 G/DL (ref 11.5–15.5)
IMM GRANULOCYTES # BLD: 0.03 10*3/MM3 (ref 0–0.03)
IMM GRANULOCYTES NFR BLD: 0.4 % (ref 0–0.6)
KETONES UR QL: NEGATIVE
LEUKOCYTE EST, POC: NEGATIVE
LYMPHOCYTES # BLD AUTO: 1.91 10*3/MM3 (ref 0.6–4.8)
LYMPHOCYTES NFR BLD AUTO: 23.1 % (ref 24–44)
Lab: NORMAL
MCH RBC QN AUTO: 34.3 PG (ref 27–31)
MCHC RBC AUTO-ENTMCNC: 33.8 G/DL (ref 32–36)
MCV RBC AUTO: 101.5 FL (ref 80–99)
MONOCYTES # BLD AUTO: 0.62 10*3/MM3 (ref 0–1)
MONOCYTES NFR BLD AUTO: 7.5 % (ref 0–12)
NEUTROPHILS # BLD AUTO: 5.43 10*3/MM3 (ref 1.5–8.3)
NEUTROPHILS NFR BLD AUTO: 65.7 % (ref 41–71)
NITRITE UR-MCNC: NEGATIVE MG/ML
PH UR: 6.5 [PH] (ref 5–8)
PLATELET # BLD AUTO: 242 10*3/MM3 (ref 150–450)
PMV BLD AUTO: 10.5 FL (ref 6–12)
POTASSIUM BLD-SCNC: 3.9 MMOL/L (ref 3.5–5.5)
PROT SERPL-MCNC: 7.4 G/DL (ref 5.7–8.2)
PROT UR STRIP-MCNC: NEGATIVE MG/DL
PROT/CREAT UR: 200 MG/G CREA
RBC # BLD AUTO: 4.11 10*6/MM3 (ref 3.89–5.14)
RBC # UR STRIP: NEGATIVE /UL
SODIUM BLD-SCNC: 139 MMOL/L (ref 132–146)
SP GR UR: 1.02 (ref 1–1.03)
T4 FREE SERPL-MCNC: 1.08 NG/DL (ref 0.89–1.76)
TRIGL SERPL-MCNC: 229 MG/DL (ref 0–150)
TSH SERPL DL<=0.05 MIU/L-ACNC: 2.66 MIU/ML (ref 0.35–5.35)
WBC NRBC COR # BLD: 8.26 10*3/MM3 (ref 3.5–10.8)

## 2018-01-03 PROCEDURE — 81002 URINALYSIS NONAUTO W/O SCOPE: CPT | Performed by: INTERNAL MEDICINE

## 2018-01-03 PROCEDURE — 84439 ASSAY OF FREE THYROXINE: CPT | Performed by: INTERNAL MEDICINE

## 2018-01-03 PROCEDURE — 90471 IMMUNIZATION ADMIN: CPT | Performed by: INTERNAL MEDICINE

## 2018-01-03 PROCEDURE — 99214 OFFICE O/P EST MOD 30 MIN: CPT | Performed by: INTERNAL MEDICINE

## 2018-01-03 PROCEDURE — 90686 IIV4 VACC NO PRSV 0.5 ML IM: CPT | Performed by: INTERNAL MEDICINE

## 2018-01-03 PROCEDURE — 80053 COMPREHEN METABOLIC PANEL: CPT | Performed by: INTERNAL MEDICINE

## 2018-01-03 PROCEDURE — 85025 COMPLETE CBC W/AUTO DIFF WBC: CPT | Performed by: INTERNAL MEDICINE

## 2018-01-03 PROCEDURE — 80061 LIPID PANEL: CPT | Performed by: INTERNAL MEDICINE

## 2018-01-03 PROCEDURE — 84443 ASSAY THYROID STIM HORMONE: CPT | Performed by: INTERNAL MEDICINE

## 2018-01-03 PROCEDURE — 36415 COLL VENOUS BLD VENIPUNCTURE: CPT | Performed by: INTERNAL MEDICINE

## 2018-01-03 RX ORDER — LEVOTHYROXINE SODIUM 0.07 MG/1
75 TABLET ORAL DAILY
Qty: 90 TABLET | Refills: 1 | Status: SHIPPED | OUTPATIENT
Start: 2018-01-03 | End: 2018-03-30 | Stop reason: SDUPTHER

## 2018-01-03 RX ORDER — LEVOTHYROXINE SODIUM 0.07 MG/1
75 TABLET ORAL DAILY
Qty: 90 TABLET | Refills: 3 | Status: SHIPPED | OUTPATIENT
Start: 2018-01-03 | End: 2018-01-03 | Stop reason: SDUPTHER

## 2018-01-03 NOTE — PROGRESS NOTES
Subjective       Roz Randolph is a 57 y.o. female.     Chief Complaint   Patient presents with   • Hypertension     follow up   fasting        History obtained from the patient.      History of Present Illness     The patient has a history of a right lower lung nodule.  Last CT of the chest was done on 5/2/14, stable.    Primary Care Cardiac Diagnostic Constellation: The patient is here today for a follow-up visit. She was last seen on 1/17/17.     Her Hypertension is unstable.   Medication(s): Losartan.   Her Hyperlipidemia has been unstable.   Her LDL goal is 130 mg/dL, last LDL was 192 mg/dL and .   Medication(s): None.   The patient is adherent with her medication regimen. She denies medication side effects.      Interval Events: The patient occasionally checksher blood pressure at home, and states it has been 130s/80s.  .   Symptoms: Denies chest pain, denies intermittent leg claudication, denies dyspnea, denies lower extremity edema, denies exercise intolerance, denies visual impairment, denies muscle pain, and denies muscle weakness.   Associated symptoms include a recent 3 pounds weight gain,  but no fatigue, no palpitations, no syncope, no headache, no orthopnea, no PND, no polydipsia, no polyuria, no focal neurologic deficits, and no memory loss.     Lifestyle and Disease Management: Diet: She consumes a diverse and healthy diet. Diet problems: high in sugar. Weight Issues: She has weight concerns. Exercise: She exercises intermittently. She exercises 2 times per week for 30 minutes per session. Exercise includes walking.    Smoking: She does not use tobacco.     Hypothyroidism Follow-Up: The patient is being seen for follow-up of Hypothyroidism, which is stable.   Interval events: She is been out of her Synthroid for 2 days.    Symptoms:  3  pound weight gain and stable hair loss, but denies cold intolerance, denies fatigue, denies weakness, denies constipation, denies dyspnea on exertion, denies  trouble concentrating,  and denies dry skin.   Associated symptoms: no myalgias, no arthralgias, and no paresthesias.   Medications:  Levothyroxine (Synthroid).   The patient is adherent to her medication regimen,  and she denies medication side effects.          Gastroesophageal Reflux Disease (Brief): The patient is being seen for a routine clinic follow-up of Gastroesophageal Reflux Disease.   Symptoms:  No abdominal pain, no heartburn, no acid regurgitation, no nausea, no vomiting, no sore throat, no dysphagia, no odynophagia, no hematemesis, and no melena    Previous Evaluation: EGD 12/21/15 (small hiatal hernia, mild duodenitis, and concentric espohageal rings suggestive of esophsgitis- path c/w mild chronic esophagitis w/ increased eosinophils suggestive of reflux (Estefanynider).   Associated symptoms:  no anorexia, no early satiety, no bloating, no belching, no weight loss, no hoarseness, no cough and no wheezing.   Current treatment include Tums prn.        Anxiety Disorder Follow-Up: The patient is being seen for follow-up of Anxiety. The patient reports doing well.   She has no comorbid illnesses.   Interval Events:  None.   Symptoms:  stable anxiety, denies difficulty concentrating, denies restlessness, denies panic attacks, denies sleep disruption and denies depression.   Associated symptoms: no suicidal ideation.   Medication(s): Wellbutrin XL.   The patient is adherent to her medication regimen, and she denies medication side effects.         Current Outpatient Prescriptions on File Prior to Visit   Medication Sig Dispense Refill   • buPROPion XL (WELLBUTRIN XL) 150 MG 24 hr tablet Take 1 tablet by mouth Every Morning. 90 tablet 3   • losartan-hydrochlorothiazide (HYZAAR) 100-12.5 MG per tablet Take 1 tablet by mouth Daily. 90 tablet 3   • [DISCONTINUED] levothyroxine (SYNTHROID, LEVOTHROID) 75 MCG tablet Take 1 tablet by mouth Daily. 90 tablet 3   • meloxicam (MOBIC) 7.5 MG tablet Take 1 tablet by mouth  Daily. 30 tablet 0   • [DISCONTINUED] dicyclomine (BENTYL) 20 MG tablet Take 1 tablet by mouth Every 6 (Six) Hours As Needed (Abdominal pain). 20 tablet 0     No current facility-administered medications on file prior to visit.          The following portions of the patient's history were reviewed and updated as appropriate: allergies, current medications, past family history, past medical history, past social history, past surgical history and problem list.    Review of Systems   Constitutional: Negative for fatigue and unexpected weight change.   Eyes: Negative for visual disturbance.   Respiratory: Negative for cough, shortness of breath and wheezing.    Cardiovascular: Negative for chest pain, palpitations and leg swelling.        No PORTILLO, orthopnea, or claudication.   Gastrointestinal: Negative for abdominal pain, blood in stool, constipation, diarrhea, nausea and vomiting.        Denies melena.   Endocrine: Negative for cold intolerance, heat intolerance, polydipsia and polyuria.   Musculoskeletal: Negative for arthralgias and myalgias.   Neurological: Negative for dizziness, syncope, light-headedness and headaches.        No memory issues.   Psychiatric/Behavioral: Negative for decreased concentration, sleep disturbance and suicidal ideas. The patient is nervous/anxious.         Denies depression.         Objective       Blood pressure 150/80, pulse 80, temperature 99 °F (37.2 °C), temperature source Temporal Artery , resp. rate 20, weight 76.8 kg (169 lb 4 oz).      Physical Exam   Constitutional:   Overweight.   Neck: Normal range of motion. Neck supple. Carotid bruit is not present. No thyromegaly present.   Cardiovascular: Normal rate, regular rhythm, normal heart sounds and intact distal pulses.  Exam reveals no gallop and no friction rub.    No murmur heard.  No peripheral edema.   Pulmonary/Chest: Effort normal and breath sounds normal.   Abdominal: Soft. Bowel sounds are normal. She exhibits no  distension, no abdominal bruit and no mass. There is no hepatosplenomegaly. There is no tenderness.   Psychiatric: She has a normal mood and affect.   Nursing note and vitals reviewed.       Results for orders placed or performed in visit on 01/03/18   POC Urinalysis Dipstick, Multipro   Result Value Ref Range    Color Yellow Yellow, Straw, Dark Yellow, María Elena    Clarity, UA Clear Clear    Glucose, UA Negative Negative, 1000 mg/dL (3+) mg/dL    Ketones, UA Negative Negative    Specific Gravity  1.025 1.005 - 1.030    Blood, UA Negative Negative    pH, Urine 6.5 5.0 - 8.0    Protein, POC Negative Negative mg/dL    Leukocytes Negative Negative    Nitrite, UA Negative Negative    Protein/Creatinine Ratio, Urine 200.0 mg/G Crea    Lot Number [id number]     Expiration Date 8-31-18        Assessment / Plan:    Diagnoses and all orders for this visit:    Essential hypertension  -     CBC & Differential  -     POC Urinalysis Dipstick, Multipro  -     CBC Auto Differential    Other hyperlipidemia  -     Lipid Panel  -     Comprehensive Metabolic Panel    Acquired hypothyroidism  -     TSH  -     T4, Free  -     levothyroxine (SYNTHROID, LEVOTHROID) 75 MCG tablet; Take 1 tablet by mouth Daily.    Gastroesophageal reflux disease without esophagitis    Lung nodule  -     CT chest w contrast; Future    Generalized anxiety disorder    Menopause  -     DEXA Bone Density Axial; Future    Need for immunization against influenza  -     Flu Vaccine Quad PF 3YR+        The patient declined scheduling a full physical for next visit.      Return in about 6 months (around 7/3/2018) for Recheck-HTN fasting.

## 2018-01-08 RX ORDER — ATORVASTATIN CALCIUM 10 MG/1
10 TABLET, FILM COATED ORAL DAILY
Qty: 30 TABLET | Refills: 5 | Status: SHIPPED | OUTPATIENT
Start: 2018-01-08 | End: 2018-02-07

## 2018-01-16 ENCOUNTER — APPOINTMENT (OUTPATIENT)
Dept: BONE DENSITY | Facility: HOSPITAL | Age: 58
End: 2018-01-16
Attending: INTERNAL MEDICINE

## 2018-02-01 ENCOUNTER — HOSPITAL ENCOUNTER (OUTPATIENT)
Dept: CT IMAGING | Facility: HOSPITAL | Age: 58
Discharge: HOME | End: 2018-02-01
Attending: INTERNAL MEDICINE | Admitting: INTERNAL MEDICINE

## 2018-02-01 DIAGNOSIS — R91.1 LUNG NODULE: ICD-10-CM

## 2018-02-01 PROCEDURE — 71260 CT THORAX DX C+: CPT

## 2018-02-01 PROCEDURE — 0 IOPAMIDOL 61 % SOLUTION: Performed by: INTERNAL MEDICINE

## 2018-02-01 RX ADMIN — IOPAMIDOL 100 ML: 612 INJECTION, SOLUTION INTRAVENOUS at 10:00

## 2018-02-12 RX ORDER — LOSARTAN POTASSIUM AND HYDROCHLOROTHIAZIDE 12.5; 1 MG/1; MG/1
1 TABLET ORAL DAILY
Qty: 90 TABLET | Refills: 3 | Status: SHIPPED | OUTPATIENT
Start: 2018-02-12 | End: 2018-02-13 | Stop reason: SDUPTHER

## 2018-02-12 RX ORDER — BUPROPION HYDROCHLORIDE 150 MG/1
150 TABLET ORAL EVERY MORNING
Qty: 90 TABLET | Refills: 3 | Status: SHIPPED | OUTPATIENT
Start: 2018-02-12 | End: 2019-03-22 | Stop reason: SDUPTHER

## 2018-02-13 RX ORDER — LOSARTAN POTASSIUM AND HYDROCHLOROTHIAZIDE 12.5; 1 MG/1; MG/1
1 TABLET ORAL DAILY
Qty: 90 TABLET | Refills: 3 | Status: SHIPPED | OUTPATIENT
Start: 2018-02-13 | End: 2019-03-21 | Stop reason: SDUPTHER

## 2018-02-16 ENCOUNTER — TELEPHONE (OUTPATIENT)
Dept: INTERNAL MEDICINE | Facility: CLINIC | Age: 58
End: 2018-02-16

## 2018-02-16 NOTE — TELEPHONE ENCOUNTER
----- Message from Lise Jasso sent at 2/16/2018  1:06 PM EST -----  Contact: SELF  JULIUS SANCHEZ CALLING TO SEE IF YOU COULD FIT HER IN TODAY, SHE SAID HER DIVERTICULITIS IS FLARING UP. SHE CAN BE REACHED [phone]

## 2018-02-16 NOTE — TELEPHONE ENCOUNTER
She has had diverticulitis before.   The last time was about a year .   She is having Left side abdominal pain .  She does not have a fever.   States her symptoms started 3 days.      [facility].

## 2018-02-26 ENCOUNTER — OFFICE VISIT (OUTPATIENT)
Dept: INTERNAL MEDICINE | Facility: CLINIC | Age: 58
End: 2018-02-26

## 2018-02-26 VITALS
TEMPERATURE: 98.5 F | DIASTOLIC BLOOD PRESSURE: 100 MMHG | HEART RATE: 80 BPM | BODY MASS INDEX: 27.62 KG/M2 | RESPIRATION RATE: 20 BRPM | SYSTOLIC BLOOD PRESSURE: 150 MMHG | WEIGHT: 166 LBS

## 2018-02-26 DIAGNOSIS — E78.49 OTHER HYPERLIPIDEMIA: Primary | ICD-10-CM

## 2018-02-26 DIAGNOSIS — Z79.899 HIGH RISK MEDICATION USE: ICD-10-CM

## 2018-02-26 DIAGNOSIS — I10 ESSENTIAL HYPERTENSION: ICD-10-CM

## 2018-02-26 LAB
ALT SERPL W P-5'-P-CCNC: 70 U/L (ref 7–40)
ARTICHOKE IGE QN: 92 MG/DL (ref 0–130)
AST SERPL-CCNC: 42 U/L (ref 0–33)
CHOLEST SERPL-MCNC: 145 MG/DL (ref 0–200)
HDLC SERPL-MCNC: 42 MG/DL (ref 40–60)
TRIGL SERPL-MCNC: 79 MG/DL (ref 0–150)

## 2018-02-26 PROCEDURE — 84460 ALANINE AMINO (ALT) (SGPT): CPT | Performed by: INTERNAL MEDICINE

## 2018-02-26 PROCEDURE — 36415 COLL VENOUS BLD VENIPUNCTURE: CPT | Performed by: INTERNAL MEDICINE

## 2018-02-26 PROCEDURE — 99214 OFFICE O/P EST MOD 30 MIN: CPT | Performed by: INTERNAL MEDICINE

## 2018-02-26 PROCEDURE — 84450 TRANSFERASE (AST) (SGOT): CPT | Performed by: INTERNAL MEDICINE

## 2018-02-26 PROCEDURE — 80061 LIPID PANEL: CPT | Performed by: INTERNAL MEDICINE

## 2018-02-26 RX ORDER — CIPROFLOXACIN 500 MG/1
TABLET, FILM COATED ORAL
Refills: 0 | COMMUNITY
Start: 2018-02-16 | End: 2018-03-03

## 2018-02-26 RX ORDER — METRONIDAZOLE 500 MG/1
TABLET ORAL
Refills: 0 | COMMUNITY
Start: 2018-02-16 | End: 2018-03-03

## 2018-02-26 NOTE — PROGRESS NOTES
Subjective       Roz Randolph is a 57 y.o. female.     Chief Complaint   Patient presents with   • Hyperlipidemia     6 week follow up  fasting        History obtained from the patient.      History of Present Illness     Primary Care Cardiac Diagnostic Constellation: The patient is here today for a follow-up visit.      Her Hypertension is unstable.   Medication(s): Losartan HCTZ.   Her Hyperlipidemia has been unstable.   Her LDL goal is 130 mg/dL, last LDL was 188 mg/dL.   Medication(s): None.   The patient is mostly adherent with her medication regimen. She denies medication side effects.       Interval Events: The patient occasionally checks her blood pressure at home, and states it has been 130s/80s.  Atorvastatin was started after the visit 1/3/18.  She has forgotten to take it 20% of the time, but no side effects.  Liver tests were mildly elevated last visit.  .   Symptoms: Denies chest pain,  intermittent leg claudication,  dyspnea, lower extremity edema,  exercise intolerance, visual impairment, muscle pain, and  muscle weakness.   Associated symptoms include a recent 3 pounds weight loss,  but no fatigue, no palpitations, no syncope, no headache, no orthopnea, no PND, no polydipsia, no polyuria, no focal neurologic deficits, and no memory loss.      Lifestyle and Disease Management: Diet: She consumes a diverse and healthy diet. Diet problems: high in sugar. Weight Issues: She has weight concerns. Exercise: She exercises intermittently. She exercises 7 times per week for 30 minutes per session. Exercise includes walking.    Smoking: She does not use tobacco.        Current Outpatient Prescriptions on File Prior to Visit   Medication Sig Dispense Refill   • buPROPion XL (WELLBUTRIN XL) 150 MG 24 hr tablet Take 1 tablet by mouth Every Morning. 90 tablet 3   • levothyroxine (SYNTHROID, LEVOTHROID) 75 MCG tablet Take 1 tablet by mouth Daily. 90 tablet 1   • losartan-hydrochlorothiazide (HYZAAR) 100-12.5 MG  per tablet Take 1 tablet by mouth Daily. 90 tablet 3   • [DISCONTINUED] meloxicam (MOBIC) 7.5 MG tablet Take 1 tablet by mouth Daily. 30 tablet 0     No current facility-administered medications on file prior to visit.          The following portions of the patient's history were reviewed and updated as appropriate: allergies, current medications, past family history, past medical history, past social history, past surgical history and problem list.    Review of Systems   Constitutional: Negative for fatigue and unexpected weight change.   Eyes: Negative for visual disturbance.   Respiratory: Negative for cough, shortness of breath and wheezing.    Cardiovascular: Negative for chest pain, palpitations and leg swelling.        No PORTILLO, orthopnea, or claudication.   Gastrointestinal: Positive for abdominal pain (sore from recent bout of diverticulitis, resolving). Negative for blood in stool, constipation, diarrhea, nausea and vomiting.        Denies melena.   Endocrine: Negative for polydipsia and polyuria.   Musculoskeletal: Negative for arthralgias and myalgias.   Neurological: Negative for dizziness, syncope, light-headedness and headaches.        No memory issues.   Psychiatric/Behavioral: Negative for decreased concentration.         Objective       Blood pressure 150/100, pulse 80, temperature 98.5 °F (36.9 °C), temperature source Temporal Artery , resp. rate 20, weight 75.3 kg (166 lb).      Physical Exam   Constitutional:   Overweight.   Neck: Normal range of motion. Neck supple. Carotid bruit is not present. No thyromegaly present.   Cardiovascular: Normal rate, regular rhythm, normal heart sounds and intact distal pulses.  Exam reveals no gallop and no friction rub.    No murmur heard.  No peripheral edema.   Pulmonary/Chest: Effort normal and breath sounds normal.   Psychiatric: She has a normal mood and affect.   Nursing note and vitals reviewed.      Assessment / Plan:    Roz was seen today for  hypertension.    Diagnoses and all orders for this visit:    Other hyperlipidemia  -     Lipid Panel    Essential hypertension    High risk medication use  -     ALT  -     AST      The patient agrees to bring her blood pressure cuff to the next visit.      Return in about 3 months (around 5/26/2018) for Recheck-Hyperlipidemia, fasting.

## 2018-03-01 ENCOUNTER — TELEPHONE (OUTPATIENT)
Dept: INTERNAL MEDICINE | Facility: CLINIC | Age: 58
End: 2018-03-01

## 2018-03-01 NOTE — TELEPHONE ENCOUNTER
3-1-18  S/w patient informed her that she needed to schedule her Dexa Scan the number for scheduling was given to patient she gave verbal understanding and stated will get this scheduled.

## 2018-03-03 ENCOUNTER — APPOINTMENT (OUTPATIENT)
Dept: CT IMAGING | Facility: HOSPITAL | Age: 58
End: 2018-03-03

## 2018-03-03 ENCOUNTER — HOSPITAL ENCOUNTER (INPATIENT)
Facility: HOSPITAL | Age: 58
LOS: 3 days | Discharge: HOME | End: 2018-03-06
Attending: EMERGENCY MEDICINE | Admitting: HOSPITALIST

## 2018-03-03 DIAGNOSIS — R10.32 LLQ ABDOMINAL PAIN: ICD-10-CM

## 2018-03-03 DIAGNOSIS — K57.32 SIGMOID DIVERTICULITIS: Primary | ICD-10-CM

## 2018-03-03 DIAGNOSIS — R00.0 SINUS TACHYCARDIA: ICD-10-CM

## 2018-03-03 DIAGNOSIS — R03.0 ELEVATED BLOOD PRESSURE READING: ICD-10-CM

## 2018-03-03 PROBLEM — D72.829 LEUKOCYTOSIS: Status: ACTIVE | Noted: 2018-03-03

## 2018-03-03 PROBLEM — D75.89 MACROCYTOSIS: Status: ACTIVE | Noted: 2018-03-03

## 2018-03-03 LAB
ALBUMIN SERPL-MCNC: 4.5 G/DL (ref 3.2–4.8)
ALBUMIN/GLOB SERPL: 1.5 G/DL (ref 1.5–2.5)
ALP SERPL-CCNC: 43 U/L (ref 25–100)
ALT SERPL W P-5'-P-CCNC: 45 U/L (ref 7–40)
ANION GAP SERPL CALCULATED.3IONS-SCNC: 7 MMOL/L (ref 3–11)
AST SERPL-CCNC: 24 U/L (ref 0–33)
BACTERIA UR QL AUTO: NORMAL /HPF
BASOPHILS # BLD AUTO: 0.03 10*3/MM3 (ref 0–0.2)
BASOPHILS NFR BLD AUTO: 0.2 % (ref 0–1)
BILIRUB SERPL-MCNC: 0.6 MG/DL (ref 0.3–1.2)
BILIRUB UR QL STRIP: NEGATIVE
BUN BLD-MCNC: 13 MG/DL (ref 9–23)
BUN/CREAT SERPL: 18.6 (ref 7–25)
CALCIUM SPEC-SCNC: 9.6 MG/DL (ref 8.7–10.4)
CHLORIDE SERPL-SCNC: 103 MMOL/L (ref 99–109)
CLARITY UR: CLEAR
CO2 SERPL-SCNC: 28 MMOL/L (ref 20–31)
COLOR UR: YELLOW
CREAT BLD-MCNC: 0.7 MG/DL (ref 0.6–1.3)
D-LACTATE SERPL-SCNC: 0.8 MMOL/L (ref 0.5–2)
DEPRECATED RDW RBC AUTO: 43.4 FL (ref 37–54)
EOSINOPHIL # BLD AUTO: 0.22 10*3/MM3 (ref 0–0.3)
EOSINOPHIL NFR BLD AUTO: 1.7 % (ref 0–3)
ERYTHROCYTE [DISTWIDTH] IN BLOOD BY AUTOMATED COUNT: 11.9 % (ref 11.3–14.5)
GFR SERPL CREATININE-BSD FRML MDRD: 87 ML/MIN/1.73
GLOBULIN UR ELPH-MCNC: 3 GM/DL
GLUCOSE BLD-MCNC: 90 MG/DL (ref 70–100)
GLUCOSE UR STRIP-MCNC: NEGATIVE MG/DL
HCT VFR BLD AUTO: 40 % (ref 34.5–44)
HGB BLD-MCNC: 13.4 G/DL (ref 11.5–15.5)
HGB UR QL STRIP.AUTO: NEGATIVE
HOLD SPECIMEN: NORMAL
HOLD SPECIMEN: NORMAL
HYALINE CASTS UR QL AUTO: NORMAL /LPF
IMM GRANULOCYTES # BLD: 0.03 10*3/MM3 (ref 0–0.03)
IMM GRANULOCYTES NFR BLD: 0.2 % (ref 0–0.6)
KETONES UR QL STRIP: NEGATIVE
LEUKOCYTE ESTERASE UR QL STRIP.AUTO: ABNORMAL
LIPASE SERPL-CCNC: 27 U/L (ref 6–51)
LYMPHOCYTES # BLD AUTO: 1.9 10*3/MM3 (ref 0.6–4.8)
LYMPHOCYTES NFR BLD AUTO: 14.7 % (ref 24–44)
MCH RBC QN AUTO: 33.6 PG (ref 27–31)
MCHC RBC AUTO-ENTMCNC: 33.5 G/DL (ref 32–36)
MCV RBC AUTO: 100.3 FL (ref 80–99)
MONOCYTES # BLD AUTO: 1.01 10*3/MM3 (ref 0–1)
MONOCYTES NFR BLD AUTO: 7.8 % (ref 0–12)
NEUTROPHILS # BLD AUTO: 9.74 10*3/MM3 (ref 1.5–8.3)
NEUTROPHILS NFR BLD AUTO: 75.4 % (ref 41–71)
NITRITE UR QL STRIP: NEGATIVE
PH UR STRIP.AUTO: 5.5 [PH] (ref 5–8)
PLATELET # BLD AUTO: 356 10*3/MM3 (ref 150–450)
PMV BLD AUTO: 10.4 FL (ref 6–12)
POTASSIUM BLD-SCNC: 3.9 MMOL/L (ref 3.5–5.5)
PROT SERPL-MCNC: 7.5 G/DL (ref 5.7–8.2)
PROT UR QL STRIP: NEGATIVE
RBC # BLD AUTO: 3.99 10*6/MM3 (ref 3.89–5.14)
RBC # UR: NORMAL /HPF
REF LAB TEST METHOD: NORMAL
SODIUM BLD-SCNC: 138 MMOL/L (ref 132–146)
SP GR UR STRIP: 1.01 (ref 1–1.03)
SQUAMOUS #/AREA URNS HPF: NORMAL /HPF
UROBILINOGEN UR QL STRIP: ABNORMAL
WBC NRBC COR # BLD: 12.93 10*3/MM3 (ref 3.5–10.8)
WBC UR QL AUTO: NORMAL /HPF
WHOLE BLOOD HOLD SPECIMEN: NORMAL
WHOLE BLOOD HOLD SPECIMEN: NORMAL

## 2018-03-03 PROCEDURE — 0 IOPAMIDOL 61 % SOLUTION: Performed by: EMERGENCY MEDICINE

## 2018-03-03 PROCEDURE — 99223 1ST HOSP IP/OBS HIGH 75: CPT | Performed by: HOSPITALIST

## 2018-03-03 PROCEDURE — 25010000002 ONDANSETRON PER 1 MG: Performed by: EMERGENCY MEDICINE

## 2018-03-03 PROCEDURE — 83690 ASSAY OF LIPASE: CPT

## 2018-03-03 PROCEDURE — 81001 URINALYSIS AUTO W/SCOPE: CPT

## 2018-03-03 PROCEDURE — 99284 EMERGENCY DEPT VISIT MOD MDM: CPT

## 2018-03-03 PROCEDURE — 74177 CT ABD & PELVIS W/CONTRAST: CPT

## 2018-03-03 PROCEDURE — 25010000002 HYDROMORPHONE PER 4 MG: Performed by: EMERGENCY MEDICINE

## 2018-03-03 PROCEDURE — 80053 COMPREHEN METABOLIC PANEL: CPT

## 2018-03-03 PROCEDURE — 25010000002 MEROPENEM: Performed by: EMERGENCY MEDICINE

## 2018-03-03 PROCEDURE — 85025 COMPLETE CBC W/AUTO DIFF WBC: CPT

## 2018-03-03 PROCEDURE — 87040 BLOOD CULTURE FOR BACTERIA: CPT | Performed by: EMERGENCY MEDICINE

## 2018-03-03 PROCEDURE — 25010000002 KETOROLAC TROMETHAMINE PER 15 MG: Performed by: EMERGENCY MEDICINE

## 2018-03-03 PROCEDURE — 83605 ASSAY OF LACTIC ACID: CPT | Performed by: EMERGENCY MEDICINE

## 2018-03-03 PROCEDURE — 25010000002 HEPARIN (PORCINE) PER 1000 UNITS: Performed by: HOSPITALIST

## 2018-03-03 RX ORDER — HYDROCODONE BITARTRATE AND ACETAMINOPHEN 5; 325 MG/1; MG/1
1 TABLET ORAL EVERY 4 HOURS PRN
Status: DISCONTINUED | OUTPATIENT
Start: 2018-03-03 | End: 2018-03-06 | Stop reason: HOSPADM

## 2018-03-03 RX ORDER — HYDROCHLOROTHIAZIDE 12.5 MG/1
12.5 TABLET ORAL DAILY
Status: DISCONTINUED | OUTPATIENT
Start: 2018-03-03 | End: 2018-03-06 | Stop reason: HOSPADM

## 2018-03-03 RX ORDER — KETOROLAC TROMETHAMINE 15 MG/ML
7.5 INJECTION, SOLUTION INTRAMUSCULAR; INTRAVENOUS ONCE
Status: COMPLETED | OUTPATIENT
Start: 2018-03-03 | End: 2018-03-03

## 2018-03-03 RX ORDER — SODIUM CHLORIDE 0.9 % (FLUSH) 0.9 %
1-10 SYRINGE (ML) INJECTION AS NEEDED
Status: DISCONTINUED | OUTPATIENT
Start: 2018-03-03 | End: 2018-03-06 | Stop reason: HOSPADM

## 2018-03-03 RX ORDER — LEVOTHYROXINE SODIUM 0.07 MG/1
75 TABLET ORAL
Status: DISCONTINUED | OUTPATIENT
Start: 2018-03-04 | End: 2018-03-06 | Stop reason: HOSPADM

## 2018-03-03 RX ORDER — LOSARTAN POTASSIUM 50 MG/1
100 TABLET ORAL
Status: DISCONTINUED | OUTPATIENT
Start: 2018-03-03 | End: 2018-03-06 | Stop reason: HOSPADM

## 2018-03-03 RX ORDER — DEXTROSE, SODIUM CHLORIDE, AND POTASSIUM CHLORIDE 5; .45; .15 G/100ML; G/100ML; G/100ML
125 INJECTION INTRAVENOUS CONTINUOUS
Status: DISCONTINUED | OUTPATIENT
Start: 2018-03-03 | End: 2018-03-05

## 2018-03-03 RX ORDER — HYDROMORPHONE HYDROCHLORIDE 1 MG/ML
0.5 INJECTION, SOLUTION INTRAMUSCULAR; INTRAVENOUS; SUBCUTANEOUS ONCE
Status: COMPLETED | OUTPATIENT
Start: 2018-03-03 | End: 2018-03-03

## 2018-03-03 RX ORDER — HEPARIN SODIUM 5000 [USP'U]/ML
5000 INJECTION, SOLUTION INTRAVENOUS; SUBCUTANEOUS EVERY 8 HOURS SCHEDULED
Status: DISCONTINUED | OUTPATIENT
Start: 2018-03-03 | End: 2018-03-06 | Stop reason: HOSPADM

## 2018-03-03 RX ORDER — ONDANSETRON 2 MG/ML
4 INJECTION INTRAMUSCULAR; INTRAVENOUS ONCE
Status: COMPLETED | OUTPATIENT
Start: 2018-03-03 | End: 2018-03-03

## 2018-03-03 RX ORDER — BUPROPION HYDROCHLORIDE 150 MG/1
150 TABLET ORAL EVERY MORNING
Status: DISCONTINUED | OUTPATIENT
Start: 2018-03-04 | End: 2018-03-06 | Stop reason: HOSPADM

## 2018-03-03 RX ORDER — ONDANSETRON 2 MG/ML
4 INJECTION INTRAMUSCULAR; INTRAVENOUS EVERY 6 HOURS PRN
Status: DISCONTINUED | OUTPATIENT
Start: 2018-03-03 | End: 2018-03-06 | Stop reason: HOSPADM

## 2018-03-03 RX ORDER — NALOXONE HCL 0.4 MG/ML
0.4 VIAL (ML) INJECTION
Status: DISCONTINUED | OUTPATIENT
Start: 2018-03-03 | End: 2018-03-06 | Stop reason: HOSPADM

## 2018-03-03 RX ORDER — SODIUM CHLORIDE 0.9 % (FLUSH) 0.9 %
10 SYRINGE (ML) INJECTION AS NEEDED
Status: DISCONTINUED | OUTPATIENT
Start: 2018-03-03 | End: 2018-03-06 | Stop reason: HOSPADM

## 2018-03-03 RX ORDER — ACETAMINOPHEN 325 MG/1
650 TABLET ORAL EVERY 4 HOURS PRN
Status: DISCONTINUED | OUTPATIENT
Start: 2018-03-03 | End: 2018-03-06 | Stop reason: HOSPADM

## 2018-03-03 RX ORDER — LOSARTAN POTASSIUM AND HYDROCHLOROTHIAZIDE 12.5; 1 MG/1; MG/1
1 TABLET ORAL DAILY
Status: DISCONTINUED | OUTPATIENT
Start: 2018-03-03 | End: 2018-03-03 | Stop reason: SDUPTHER

## 2018-03-03 RX ORDER — HYDROMORPHONE HYDROCHLORIDE 1 MG/ML
0.5 INJECTION, SOLUTION INTRAMUSCULAR; INTRAVENOUS; SUBCUTANEOUS
Status: DISCONTINUED | OUTPATIENT
Start: 2018-03-03 | End: 2018-03-06 | Stop reason: HOSPADM

## 2018-03-03 RX ORDER — FAMOTIDINE 20 MG/1
40 TABLET, FILM COATED ORAL DAILY
Status: DISCONTINUED | OUTPATIENT
Start: 2018-03-03 | End: 2018-03-06 | Stop reason: HOSPADM

## 2018-03-03 RX ORDER — SACCHAROMYCES BOULARDII 250 MG
250 CAPSULE ORAL 2 TIMES DAILY
Status: DISCONTINUED | OUTPATIENT
Start: 2018-03-03 | End: 2018-03-06 | Stop reason: HOSPADM

## 2018-03-03 RX ORDER — SENNA AND DOCUSATE SODIUM 50; 8.6 MG/1; MG/1
2 TABLET, FILM COATED ORAL NIGHTLY PRN
Status: DISCONTINUED | OUTPATIENT
Start: 2018-03-03 | End: 2018-03-06 | Stop reason: HOSPADM

## 2018-03-03 RX ADMIN — IOPAMIDOL 95 ML: 612 INJECTION, SOLUTION INTRAVENOUS at 17:58

## 2018-03-03 RX ADMIN — SODIUM CHLORIDE 2178 ML: 9 INJECTION, SOLUTION INTRAVENOUS at 17:22

## 2018-03-03 RX ADMIN — HYDROMORPHONE HYDROCHLORIDE 0.5 MG: 10 INJECTION INTRAMUSCULAR; INTRAVENOUS; SUBCUTANEOUS at 17:26

## 2018-03-03 RX ADMIN — FAMOTIDINE 40 MG: 20 TABLET, FILM COATED ORAL at 20:25

## 2018-03-03 RX ADMIN — Medication 250 MG: at 20:25

## 2018-03-03 RX ADMIN — LOSARTAN POTASSIUM 100 MG: 50 TABLET ORAL at 20:25

## 2018-03-03 RX ADMIN — HYDROCHLOROTHIAZIDE 12.5 MG: 12.5 TABLET ORAL at 20:25

## 2018-03-03 RX ADMIN — HYDROCODONE BITARTRATE AND ACETAMINOPHEN 1 TABLET: 5; 325 TABLET ORAL at 20:25

## 2018-03-03 RX ADMIN — MEROPENEM 1 G: 1 INJECTION, POWDER, FOR SOLUTION INTRAVENOUS at 23:38

## 2018-03-03 RX ADMIN — HEPARIN SODIUM 5000 UNITS: 5000 INJECTION, SOLUTION INTRAVENOUS; SUBCUTANEOUS at 20:25

## 2018-03-03 RX ADMIN — ONDANSETRON 4 MG: 2 INJECTION INTRAMUSCULAR; INTRAVENOUS at 17:23

## 2018-03-03 RX ADMIN — KETOROLAC TROMETHAMINE 7.5 MG: 15 INJECTION, SOLUTION INTRAMUSCULAR; INTRAVENOUS at 17:24

## 2018-03-03 RX ADMIN — POTASSIUM CHLORIDE, DEXTROSE MONOHYDRATE AND SODIUM CHLORIDE 125 ML/HR: 150; 5; 450 INJECTION, SOLUTION INTRAVENOUS at 20:25

## 2018-03-03 RX ADMIN — MEROPENEM 1 G: 1 INJECTION, POWDER, FOR SOLUTION INTRAVENOUS at 18:30

## 2018-03-03 RX ADMIN — METRONIDAZOLE 500 MG: 500 INJECTION, SOLUTION INTRAVENOUS at 18:33

## 2018-03-03 NOTE — ED PROVIDER NOTES
Subjective   HPI Comments: Ms. Roz Randolph is a 57 y.o. female with a hx of diverticulitis who presents to the ED with c/o LLQ abdominal pain. She reports that she has had an ongoing episode of diverticulitis for around the past month and received a round of Cipro and Flagyl on February 9th. She states that she finished both abx and was feeling a little bit better before her pain worsened again. She notes that she never felt completely better. She has not had any scans with this episode. She also complains of a fever, and nausea but she denies vomiting. She has a hx of a hysterectomy, cholecystectomy, and appendectomy. No other acute complaints at this time.    Patient is a 57 y.o. female presenting with abdominal pain.   History provided by:  Patient and medical records  Abdominal Pain   Pain location:  LLQ  Pain severity:  Moderate  Onset quality:  Gradual  Duration:  4 weeks  Timing:  Constant  Progression:  Waxing and waning  Chronicity:  New  Associated symptoms: fever and nausea    Associated symptoms: no vomiting        Review of Systems   Constitutional: Positive for fever.   Gastrointestinal: Positive for abdominal pain and nausea. Negative for vomiting.   All other systems reviewed and are negative.      Past Medical History:   Diagnosis Date   • Abnormal liver enzymes    • Acute diverticulitis 09/2013    sigmoid colon dx by CT (also episodes 6/14, 9/14, and 12/14)   • Anxiety disorder    • Closed fracture of distal phalanx or phalanges of hand 11/2013    Of the fourth finger, right   • Closed head injury 06/2014    CT negative   • Diverticulosis of large intestine    • Gastroesophageal reflux disease    • Heart murmur    • History of esophagogastroduodenoscopy (EGD) 12/21/2015    small hiatal hernia, mild duodenitis, and concentric espohageal rings suggestive of esophsgitis- path c/w mild chronic esophagitis w/ increased eosinophils suggestive of reflux ([facility]adi)   • History of varicella    •  Hyperlipidemia     Description: dx 7/08.   • Hypertension     Description: dx approx 2000.   • Hypothyroidism     Description: dx 4/14.   • Lung nodule     Description: 9/13- RLL (4 mm) nodule).  5/2/14- stable.       Allergies   Allergen Reactions   • Augmentin [Amoxicillin-Pot Clavulanate]    • Morphine And Related Rash   • Tetracyclines & Related Rash, GI Intolerance and Swelling       Past Surgical History:   Procedure Laterality Date   • APPENDECTOMY  1952   • CHOLECYSTECTOMY  1993   • TOTAL ABDOMINAL HYSTERECTOMY WITH SALPINGO OOPHORECTOMY Bilateral 02/2004       Family History   Problem Relation Age of Onset   • Diabetes Mother    • Hypertension Mother    • Hypothyroidism Mother    • Coronary artery disease Father 47     MI age 47 and 65   • Hypertension Father    • Hypothyroidism Sister    • Diabetes Maternal Aunt    • Breast cancer Maternal Aunt    • Coronary artery disease Maternal Grandmother    • Diabetes Maternal Grandmother    • Colon cancer Paternal Grandmother    • Coronary artery disease Paternal Grandfather    • Hypothyroidism Sister    • Ovarian cancer Neg Hx        Social History     Social History   • Marital status:      Social History Main Topics   • Smoking status: Never Smoker   • Smokeless tobacco: Never Used   • Alcohol use Yes      Comment: twice weekly   • Drug use: No   • Sexual activity: Defer         Objective   Physical Exam   Constitutional: She is oriented to person, place, and time. She appears well-developed and well-nourished. No distress.   Patient appears mildly anxious.   HENT:   Head: Normocephalic and atraumatic.   Nose: Nose normal.   Eyes: Conjunctivae are normal. No scleral icterus.   Neck: Normal range of motion. Neck supple.   Cardiovascular: Regular rhythm, normal heart sounds and intact distal pulses.  Tachycardia present.    No murmur heard.  Pulmonary/Chest: Effort normal and breath sounds normal. No respiratory distress.   Abdominal: Soft. Bowel sounds are  increased. There is tenderness. There is guarding. There is no rigidity.   Patient has marked TTP and guarding to the LLQ with no rigidity. Hyperactive bowel sounds.   Musculoskeletal: Normal range of motion.   Neurological: She is alert and oriented to person, place, and time.   Skin: Skin is warm and dry. She is not diaphoretic.   Psychiatric: She has a normal mood and affect. Her behavior is normal.   Nursing note and vitals reviewed.      Procedures         ED Course  ED Course   Value Comment By Time    Upon review of old charts, they show that the patient called her PCP 2 weeks ago with concerns about diverticulitis and that she has a hx of cholecystectomy and appendectomy. -KEELEY Millicent BALTAZAR Milagros 03/03 1657   WBC: (!) 12.93 (Reviewed) Larry Marquez MD 03/03 1713   Heart Rate: (!) 126 (Reviewed) Larry Marquez MD 03/03 1728   Temp: 99.4 °F (37.4 °C) (Reviewed) Larry Marquez MD 03/03 1728    CT scan demonstrates findings consistent with sigmoid diverticulitis with significant straining.  We'll admit the patient for failed outpatient therapy and worsening disease.  Case discussed with Dr. Ca who agrees to admit. Larry Marquez MD 03/03 1811    The patient and her  were updated on the findings and plan.  The patient is agreeable and appreciative. Larry Marquez MD 03/03 1815     Recent Results (from the past 24 hour(s))   Comprehensive Metabolic Panel    Collection Time: 03/03/18  5:00 PM   Result Value Ref Range    Glucose 90 70 - 100 mg/dL    BUN 13 9 - 23 mg/dL    Creatinine 0.70 0.60 - 1.30 mg/dL    Sodium 138 132 - 146 mmol/L    Potassium 3.9 3.5 - 5.5 mmol/L    Chloride 103 99 - 109 mmol/L    CO2 28.0 20.0 - 31.0 mmol/L    Calcium 9.6 8.7 - 10.4 mg/dL    Total Protein 7.5 5.7 - 8.2 g/dL    Albumin 4.50 3.20 - 4.80 g/dL    ALT (SGPT) 45 (H) 7 - 40 U/L    AST (SGOT) 24 0 - 33 U/L    Alkaline Phosphatase 43 25 - 100 U/L    Total Bilirubin 0.6 0.3 - 1.2 mg/dL    eGFR Non   Amer 87 >60 mL/min/1.73    Globulin 3.0 gm/dL    A/G Ratio 1.5 1.5 - 2.5 g/dL    BUN/Creatinine Ratio 18.6 7.0 - 25.0    Anion Gap 7.0 3.0 - 11.0 mmol/L   Lipase    Collection Time: 03/03/18  5:00 PM   Result Value Ref Range    Lipase 27 6 - 51 U/L   Light Blue Top    Collection Time: 03/03/18  5:00 PM   Result Value Ref Range    Extra Tube hold for add-on    Green Top (Gel)    Collection Time: 03/03/18  5:00 PM   Result Value Ref Range    Extra Tube Hold for add-ons.    Lavender Top    Collection Time: 03/03/18  5:00 PM   Result Value Ref Range    Extra Tube hold for add-on    Gold Top - SST    Collection Time: 03/03/18  5:00 PM   Result Value Ref Range    Extra Tube Hold for add-ons.    CBC Auto Differential    Collection Time: 03/03/18  5:00 PM   Result Value Ref Range    WBC 12.93 (H) 3.50 - 10.80 10*3/mm3    RBC 3.99 3.89 - 5.14 10*6/mm3    Hemoglobin 13.4 11.5 - 15.5 g/dL    Hematocrit 40.0 34.5 - 44.0 %    .3 (H) 80.0 - 99.0 fL    MCH 33.6 (H) 27.0 - 31.0 pg    MCHC 33.5 32.0 - 36.0 g/dL    RDW 11.9 11.3 - 14.5 %    RDW-SD 43.4 37.0 - 54.0 fl    MPV 10.4 6.0 - 12.0 fL    Platelets 356 150 - 450 10*3/mm3    Neutrophil % 75.4 (H) 41.0 - 71.0 %    Lymphocyte % 14.7 (L) 24.0 - 44.0 %    Monocyte % 7.8 0.0 - 12.0 %    Eosinophil % 1.7 0.0 - 3.0 %    Basophil % 0.2 0.0 - 1.0 %    Immature Grans % 0.2 0.0 - 0.6 %    Neutrophils, Absolute 9.74 (H) 1.50 - 8.30 10*3/mm3    Lymphocytes, Absolute 1.90 0.60 - 4.80 10*3/mm3    Monocytes, Absolute 1.01 (H) 0.00 - 1.00 10*3/mm3    Eosinophils, Absolute 0.22 0.00 - 0.30 10*3/mm3    Basophils, Absolute 0.03 0.00 - 0.20 10*3/mm3    Immature Grans, Absolute 0.03 0.00 - 0.03 10*3/mm3   Urinalysis With / Culture If Indicated - Urine, Clean Catch    Collection Time: 03/03/18  5:08 PM   Result Value Ref Range    Color, UA Yellow Yellow, Straw    Appearance, UA Clear Clear    pH, UA 5.5 5.0 - 8.0    Specific Gravity, UA 1.010 1.001 - 1.030    Glucose, UA Negative  "Negative    Ketones, UA Negative Negative    Bilirubin, UA Negative Negative    Blood, UA Negative Negative    Protein, UA Negative Negative    Leuk Esterase, UA Trace (A) Negative    Nitrite, UA Negative Negative    Urobilinogen, UA 0.2 E.U./dL 0.2 - 1.0 E.U./dL   Urinalysis, Microscopic Only - Urine, Clean Catch    Collection Time: 03/03/18  5:08 PM   Result Value Ref Range    RBC, UA 0-2 None Seen, 0-2 /HPF    WBC, UA 0-2 None Seen, 0-2 /HPF    Bacteria, UA None Seen None Seen, Trace /HPF    Squamous Epithelial Cells, UA 0-2 None Seen, 0-2 /HPF    Hyaline Casts, UA 0-6 0 - 6 /LPF    Methodology Automated Microscopy    Lactic Acid, Plasma    Collection Time: 03/03/18  5:46 PM   Result Value Ref Range    Lactate 0.8 0.5 - 2.0 mmol/L     Note: In addition to lab results from this visit, the labs listed above may include labs taken at another facility or during a different encounter within the last 24 hours. Please correlate lab times with ED admission and discharge times for further clarification of the services performed during this visit.    CT Abdomen Pelvis With Contrast   Preliminary Result   Mild acute sigmoid diverticulitis. No perforation or   abscess. Minimal surrounding stranding and wall thickening.       DICTATED:     03/03/2018   EDITED    :     03/03/2018             Vitals:    03/03/18 1840 03/03/18 1902 03/03/18 1903 03/03/18 2350   BP:  125/81 137/84 137/85   BP Location:  Left arm Right arm Right arm   Patient Position:  Lying Lying Lying   Pulse: 84 94 86 77   Resp: 16 20 20 18   Temp: 99 °F (37.2 °C) 98.7 °F (37.1 °C)  98.4 °F (36.9 °C)   TempSrc: Oral Oral  Oral   SpO2:       Weight:  76.9 kg (169 lb 9.6 oz)     Height:  160 cm (63\")       Medications   sodium chloride 0.9 % flush 10 mL (not administered)   sodium chloride 0.9 % bolus 2,178 mL (2,178 mL Intravenous New Bag 3/3/18 1722)   buPROPion XL (WELLBUTRIN XL) 24 hr tablet 150 mg (not administered)   levothyroxine (SYNTHROID, LEVOTHROID) " tablet 75 mcg (not administered)   sodium chloride 0.9 % flush 1-10 mL (not administered)   heparin (porcine) 5000 UNIT/ML injection 5,000 Units (0 Units Subcutaneous Hold 3/3/18 2120)   dextrose 5 % and sodium chloride 0.45 % with KCl 20 mEq/L infusion (125 mL/hr Intravenous New Bag 3/3/18 2025)   acetaminophen (TYLENOL) tablet 650 mg (not administered)   HYDROcodone-acetaminophen (NORCO) 5-325 MG per tablet 1 tablet (1 tablet Oral Given 3/3/18 2025)   HYDROmorphone (DILAUDID) injection 0.5 mg (not administered)     And   naloxone (NARCAN) injection 0.4 mg (not administered)   sennosides-docusate sodium (SENOKOT-S) 8.6-50 MG tablet 2 tablet (not administered)   ondansetron (ZOFRAN) injection 4 mg (not administered)   famotidine (PEPCID) tablet 40 mg (40 mg Oral Given 3/3/18 2025)   saccharomyces boulardii (FLORASTOR) capsule 250 mg (250 mg Oral Given 3/3/18 2025)   losartan (COZAAR) tablet 100 mg (100 mg Oral Given 3/3/18 2025)     And   hydrochlorothiazide (HYDRODIURIL) tablet 12.5 mg (12.5 mg Oral Given 3/3/18 2025)   meropenem (MERREM) 1 g/100 mL 0.9% NS VTB (mbp) (1 g Intravenous New Bag 3/3/18 2338)   metroNIDAZOLE (FLAGYL) IVPB 500 mg (not administered)   ondansetron (ZOFRAN) injection 4 mg (4 mg Intravenous Given 3/3/18 1723)   HYDROmorphone (DILAUDID) injection 0.5 mg (0.5 mg Intravenous Given 3/3/18 1726)   ketorolac (TORADOL) injection 7.5 mg (7.5 mg Intravenous Given 3/3/18 1724)   iopamidol (ISOVUE-300) 61 % injection 100 mL (95 mL Intravenous Given 3/3/18 1758)   metroNIDAZOLE (FLAGYL) IVPB 500 mg (500 mg Intravenous New Bag 3/3/18 1833)   meropenem (MERREM) 1 g/100 mL 0.9% NS VTB (mbp) (1 g Intravenous New Bag 3/3/18 1830)     ECG/EMG Results (last 24 hours)     ** No results found for the last 24 hours. **                          MDM  Number of Diagnoses or Management Options  Elevated blood pressure reading:   LLQ abdominal pain:   Sigmoid diverticulitis:   Sinus tachycardia:      Amount and/or  Complexity of Data Reviewed  Clinical lab tests: reviewed  Tests in the radiology section of CPT®: reviewed  Discuss the patient with other providers: yes  Independent visualization of images, tracings, or specimens: yes        Final diagnoses:   Sigmoid diverticulitis   LLQ abdominal pain   Elevated blood pressure reading   Sinus tachycardia       Documentation assistance provided by regina Linares.  Information recorded by the scribe was done at my direction and has been verified and validated by me.     Millicent Linares  03/03/18 1719       Millicent Linares  03/03/18 1835       Larry Marquez MD  03/04/18 0151

## 2018-03-04 LAB
ANION GAP SERPL CALCULATED.3IONS-SCNC: 5 MMOL/L (ref 3–11)
BASOPHILS # BLD AUTO: 0.01 10*3/MM3 (ref 0–0.2)
BASOPHILS NFR BLD AUTO: 0.1 % (ref 0–1)
BUN BLD-MCNC: 10 MG/DL (ref 9–23)
BUN/CREAT SERPL: 14.3 (ref 7–25)
CALCIUM SPEC-SCNC: 8.3 MG/DL (ref 8.7–10.4)
CHLORIDE SERPL-SCNC: 108 MMOL/L (ref 99–109)
CO2 SERPL-SCNC: 27 MMOL/L (ref 20–31)
CREAT BLD-MCNC: 0.7 MG/DL (ref 0.6–1.3)
DEPRECATED RDW RBC AUTO: 44.8 FL (ref 37–54)
EOSINOPHIL # BLD AUTO: 0.19 10*3/MM3 (ref 0–0.3)
EOSINOPHIL NFR BLD AUTO: 2.6 % (ref 0–3)
ERYTHROCYTE [DISTWIDTH] IN BLOOD BY AUTOMATED COUNT: 12.2 % (ref 11.3–14.5)
GFR SERPL CREATININE-BSD FRML MDRD: 87 ML/MIN/1.73
GLUCOSE BLD-MCNC: 100 MG/DL (ref 70–100)
HCT VFR BLD AUTO: 33.2 % (ref 34.5–44)
HGB BLD-MCNC: 10.9 G/DL (ref 11.5–15.5)
IMM GRANULOCYTES # BLD: 0.01 10*3/MM3 (ref 0–0.03)
IMM GRANULOCYTES NFR BLD: 0.1 % (ref 0–0.6)
LYMPHOCYTES # BLD AUTO: 1.72 10*3/MM3 (ref 0.6–4.8)
LYMPHOCYTES NFR BLD AUTO: 23.7 % (ref 24–44)
MCH RBC QN AUTO: 33.2 PG (ref 27–31)
MCHC RBC AUTO-ENTMCNC: 32.8 G/DL (ref 32–36)
MCV RBC AUTO: 101.2 FL (ref 80–99)
MONOCYTES # BLD AUTO: 0.68 10*3/MM3 (ref 0–1)
MONOCYTES NFR BLD AUTO: 9.4 % (ref 0–12)
NEUTROPHILS # BLD AUTO: 4.66 10*3/MM3 (ref 1.5–8.3)
NEUTROPHILS NFR BLD AUTO: 64.1 % (ref 41–71)
PLATELET # BLD AUTO: 263 10*3/MM3 (ref 150–450)
PMV BLD AUTO: 10.9 FL (ref 6–12)
POTASSIUM BLD-SCNC: 3.6 MMOL/L (ref 3.5–5.5)
RBC # BLD AUTO: 3.28 10*6/MM3 (ref 3.89–5.14)
SODIUM BLD-SCNC: 140 MMOL/L (ref 132–146)
TSH SERPL DL<=0.05 MIU/L-ACNC: 1.21 MIU/ML (ref 0.35–5.35)
WBC NRBC COR # BLD: 7.27 10*3/MM3 (ref 3.5–10.8)

## 2018-03-04 PROCEDURE — 80048 BASIC METABOLIC PNL TOTAL CA: CPT | Performed by: HOSPITALIST

## 2018-03-04 PROCEDURE — 25010000002 HYDROMORPHONE PER 4 MG: Performed by: HOSPITALIST

## 2018-03-04 PROCEDURE — 25010000002 HEPARIN (PORCINE) PER 1000 UNITS: Performed by: HOSPITALIST

## 2018-03-04 PROCEDURE — 85025 COMPLETE CBC W/AUTO DIFF WBC: CPT | Performed by: HOSPITALIST

## 2018-03-04 PROCEDURE — 99233 SBSQ HOSP IP/OBS HIGH 50: CPT | Performed by: HOSPITALIST

## 2018-03-04 PROCEDURE — 25010000002 MEROPENEM: Performed by: NURSE PRACTITIONER

## 2018-03-04 PROCEDURE — 84443 ASSAY THYROID STIM HORMONE: CPT | Performed by: HOSPITALIST

## 2018-03-04 RX ORDER — IBUPROFEN 400 MG/1
400 TABLET ORAL ONCE AS NEEDED
Status: COMPLETED | OUTPATIENT
Start: 2018-03-04 | End: 2018-03-04

## 2018-03-04 RX ADMIN — HYDROCHLOROTHIAZIDE 12.5 MG: 12.5 TABLET ORAL at 08:18

## 2018-03-04 RX ADMIN — LEVOTHYROXINE SODIUM 75 MCG: 75 TABLET ORAL at 05:10

## 2018-03-04 RX ADMIN — METRONIDAZOLE 500 MG: 500 INJECTION, SOLUTION INTRAVENOUS at 02:43

## 2018-03-04 RX ADMIN — HYDROCODONE BITARTRATE AND ACETAMINOPHEN 1 TABLET: 5; 325 TABLET ORAL at 16:03

## 2018-03-04 RX ADMIN — LOSARTAN POTASSIUM 100 MG: 50 TABLET ORAL at 08:18

## 2018-03-04 RX ADMIN — METRONIDAZOLE 500 MG: 500 INJECTION, SOLUTION INTRAVENOUS at 10:37

## 2018-03-04 RX ADMIN — Medication 250 MG: at 20:44

## 2018-03-04 RX ADMIN — MEROPENEM 1 G: 1 INJECTION, POWDER, FOR SOLUTION INTRAVENOUS at 23:47

## 2018-03-04 RX ADMIN — HEPARIN SODIUM 5000 UNITS: 5000 INJECTION, SOLUTION INTRAVENOUS; SUBCUTANEOUS at 23:47

## 2018-03-04 RX ADMIN — FAMOTIDINE 40 MG: 20 TABLET, FILM COATED ORAL at 08:19

## 2018-03-04 RX ADMIN — POTASSIUM CHLORIDE, DEXTROSE MONOHYDRATE AND SODIUM CHLORIDE 125 ML/HR: 150; 5; 450 INJECTION, SOLUTION INTRAVENOUS at 07:06

## 2018-03-04 RX ADMIN — BUPROPION HYDROCHLORIDE 150 MG: 150 TABLET, FILM COATED, EXTENDED RELEASE ORAL at 08:19

## 2018-03-04 RX ADMIN — IBUPROFEN 400 MG: 400 TABLET ORAL at 23:47

## 2018-03-04 RX ADMIN — MEROPENEM 1 G: 1 INJECTION, POWDER, FOR SOLUTION INTRAVENOUS at 08:17

## 2018-03-04 RX ADMIN — HEPARIN SODIUM 5000 UNITS: 5000 INJECTION, SOLUTION INTRAVENOUS; SUBCUTANEOUS at 05:10

## 2018-03-04 RX ADMIN — HYDROMORPHONE HYDROCHLORIDE 0.5 MG: 10 INJECTION INTRAMUSCULAR; INTRAVENOUS; SUBCUTANEOUS at 04:56

## 2018-03-04 RX ADMIN — HEPARIN SODIUM 5000 UNITS: 5000 INJECTION, SOLUTION INTRAVENOUS; SUBCUTANEOUS at 14:29

## 2018-03-04 RX ADMIN — HYDROCODONE BITARTRATE AND ACETAMINOPHEN 1 TABLET: 5; 325 TABLET ORAL at 10:37

## 2018-03-04 RX ADMIN — Medication 250 MG: at 08:19

## 2018-03-04 RX ADMIN — HYDROCODONE BITARTRATE AND ACETAMINOPHEN 1 TABLET: 5; 325 TABLET ORAL at 20:45

## 2018-03-04 RX ADMIN — METRONIDAZOLE 500 MG: 500 INJECTION, SOLUTION INTRAVENOUS at 18:23

## 2018-03-04 RX ADMIN — MEROPENEM 1 G: 1 INJECTION, POWDER, FOR SOLUTION INTRAVENOUS at 14:29

## 2018-03-04 NOTE — H&P
[facility] Medicine Services  HISTORY AND PHYSICAL    Patient Name: Roz Randolph  : 1960  MRN: [id number]  Primary Care Physician: Maia Melton MD    Subjective   Subjective     Chief Complaint:  Abdominal pain     HPI:  Roz Randolph is a 57 y.o. female with PMH significant for diverticulitis, HTN, HLD, and hypothyroid that presents to the ED with complaint of abdominal pain. She states that for the past month she has been having abdominal pain that presents like when she has a flare of her diverticulitis. She was treated on 18 with Cipro and Flagyl that she recently completed a 2 week course of. She notes that her pain improved but never resolved and then acutely increased again over the past day or two. Her pain is located in the LLQ and radiates generally all over her abdomen. She describes it as a dull ache and 4/10 with intermittent sharp cramping that is 8/10. She notes low grade fever as well as nausea but denies vomiting. She has had a couple of loose stools but no diarrhea. She denies chest pain of SOA.   She states that her last colonoscopy was about 3 years ago and thinks it was with Dr Brush. She also notes that due to her disease severity, she has been recommended to have a colon resection, but has never followed up with a surgeon.   Upon arrival to the ED, she is found to have sigmoid diverticulitis per CT of the abdomen. She will be admitted to Hospital Medicine for further evaluation.     Review of Systems   Constitutional: Positive for activity change and fever. Negative for appetite change, chills, diaphoresis and fatigue.   HENT: Negative.    Eyes: Negative for visual disturbance.   Respiratory: Negative for cough, shortness of breath and wheezing.    Cardiovascular: Negative for chest pain, palpitations and leg swelling.   Gastrointestinal: Positive for abdominal distention, abdominal pain and nausea. Negative for blood in stool, diarrhea and  vomiting.   Genitourinary: Negative for difficulty urinating, dysuria, frequency, hematuria and urgency.   Musculoskeletal: Negative for arthralgias, back pain and myalgias.   Skin: Negative for color change and pallor.   Neurological: Negative for dizziness, weakness, light-headedness and headaches.   Psychiatric/Behavioral: Negative for confusion. The patient is nervous/anxious.         Otherwise 10-system ROS reviewed and is negative except as mentioned in the HPI.    Personal History     Past Medical History:   Diagnosis Date   • Abnormal liver enzymes    • Acute diverticulitis 09/2013    sigmoid colon dx by CT (also episodes 6/14, 9/14, and 12/14)   • Anxiety disorder    • Closed fracture of distal phalanx or phalanges of hand 11/2013    Of the fourth finger, right   • Closed head injury 06/2014    CT negative   • Diverticulosis of large intestine    • Gastroesophageal reflux disease    • Heart murmur    • History of esophagogastroduodenoscopy (EGD) 12/21/2015    small hiatal hernia, mild duodenitis, and concentric espohageal rings suggestive of esophsgitis- path c/w mild chronic esophagitis w/ increased eosinophils suggestive of reflux (Fadi)   • History of varicella    • Hyperlipidemia     Description: dx 7/08.   • Hypertension     Description: dx approx 2000.   • Hypothyroidism     Description: dx 4/14.   • Lung nodule     Description: 9/13- RLL (4 mm) nodule).  5/2/14- stable.       Past Surgical History:   Procedure Laterality Date   • APPENDECTOMY  1952   • CHOLECYSTECTOMY  1993   • TOTAL ABDOMINAL HYSTERECTOMY WITH SALPINGO OOPHORECTOMY Bilateral 02/2004       Family History: family history includes Breast cancer in her maternal aunt; Colon cancer in her paternal grandmother; Coronary artery disease in her maternal grandmother and paternal grandfather; Coronary artery disease (age of onset: 47) in her father; Diabetes in her maternal aunt, maternal grandmother, and mother; Hypertension in her father  and mother; Hypothyroidism in her mother, sister, and sister. There is no history of Ovarian cancer.     Social History:  reports that she has never smoked. She has never used smokeless tobacco. She reports that she drinks alcohol. She reports that she does not use illicit drugs.  Social History     Social History Narrative   • No narrative on file       Medications:  Prescriptions Prior to Admission   Medication Sig Dispense Refill Last Dose   • buPROPion XL (WELLBUTRIN XL) 150 MG 24 hr tablet Take 1 tablet by mouth Every Morning. 90 tablet 3 Taking   • levothyroxine (SYNTHROID, LEVOTHROID) 75 MCG tablet Take 1 tablet by mouth Daily. 90 tablet 1 Taking   • losartan-hydrochlorothiazide (HYZAAR) 100-12.5 MG per tablet Take 1 tablet by mouth Daily. 90 tablet 3 Taking       Allergies   Allergen Reactions   • Augmentin [Amoxicillin-Pot Clavulanate]    • Morphine And Related Rash   • Tetracyclines & Related Rash, GI Intolerance and Swelling       Objective   Objective     Vital Signs:   Temp:  [99 °F (37.2 °C)-99.4 °F (37.4 °C)] 99 °F (37.2 °C)  Heart Rate:  [] 84  Resp:  [16-18] 16  BP: (146-182)/(83-97) 146/83        Physical Exam   Constitutional: She is oriented to person, place, and time. She appears well-developed and well-nourished. No distress.   HENT:   Head: Normocephalic and atraumatic.   Eyes: Pupils are equal, round, and reactive to light.   Neck: Normal range of motion. Neck supple. No JVD present.   Cardiovascular: Normal rate, regular rhythm, normal heart sounds and intact distal pulses.  Exam reveals no gallop and no friction rub.    No murmur heard.  Pulmonary/Chest: Effort normal and breath sounds normal. She has no wheezes. She has no rales.   Abdominal: Soft. She exhibits no distension and no mass. Bowel sounds are increased. There is generalized tenderness and tenderness in the left lower quadrant. There is guarding.   Generally TTP all over with pain that radiates to the LLQ.  Much more TTP  in LLQ with guarding     Musculoskeletal: Normal range of motion. She exhibits no edema or tenderness.   Neurological: She is alert and oriented to person, place, and time.   Skin: Skin is warm and dry. No erythema. No pallor.   Psychiatric: She has a normal mood and affect. Her behavior is normal. Thought content normal.   Vitals reviewed.       Results Reviewed:  I have personally reviewed current lab, radiology, and data and agree.      Results from last 7 days  Lab Units 03/03/18  1700   WBC 10*3/mm3 12.93*   HEMOGLOBIN g/dL 13.4   HEMATOCRIT % 40.0   PLATELETS 10*3/mm3 356       Results from last 7 days  Lab Units 03/03/18  1700   SODIUM mmol/L 138   POTASSIUM mmol/L 3.9   CHLORIDE mmol/L 103   CO2 mmol/L 28.0   BUN mg/dL 13   CREATININE mg/dL 0.70   GLUCOSE mg/dL 90   CALCIUM mg/dL 9.6   ALT (SGPT) U/L 45*   AST (SGOT) U/L 24     Estimated Creatinine Clearance: 87.7 mL/min (by C-G formula based on Cr of 0.7).  Brief Urine Lab Results  (Last result in the past 365 days)      Color   Clarity   Blood   Leuk Est   Nitrite   Protein   CREAT   Urine HCG        03/03/18 1708 Yellow Clear Negative Trace(A) Negative Negative             No results found for: BNP  No results found for: PHART  Imaging Results (last 24 hours)     Procedure Component Value Units Date/Time    CT Abdomen Pelvis With Contrast [[id number]] Collected:  03/03/18 1900     Updated:  03/03/18 1900    Narrative:       EXAMINATION: CT ABDOMEN PELVIS W/CONTRAST - 03/03/2018      INDICATION: K57.32-Diverticulitis of large intestine without perforation  or abscess without bleeding; R10.32-Left lower quadrant pain;  R03.0-Elevated blood-pressure reading, without diagnosis of  hypertension. Left lower quadrant pain, nausea, diarrhea.     TECHNIQUE: Multiple axial CT imaging is obtained of the abdomen and  pelvis following the administration of intravenous contrast.     The radiation dose reduction device was turned on for each scan per the  ALARA (As Low  as Reasonably Achievable) protocol.     COMPARISON: 01/21/2017,     FINDINGS:      ABDOMEN: Several noncalcified nodules identified at the right and left  lung base, 4 mm or less in size suggesting noncalcified granulomas.  These are stable when compared to the prior study of 2017. Liver is  homogeneous in appearance. The spleen is unremarkable. Kidneys and  adrenal glands within normal limits. The pancreas is homogeneous. Small  hiatal hernia. There has been surgical removal of the gallbladder. No  abdominal or retroperitoneal lymphadenopathy. The abdominal portion of  the gastrointestinal tract is within normal limits.     PELVIS: Abnormal wall thickening and stranding are seen surrounding the  proximal sigmoid colon suggesting an acute sigmoid diverticulitis.  Minimal fluid identified in the left paracolic gutter. There is no  abscess or free air to suggest perforation. The pelvic organs are  unremarkable. The gastrointestinal tract is  otherwise within normal  limits. There is no pelvic adenopathy. Bony structures reveal minimal  degenerative changes seen within the spine and pelvis.       Impression:       Mild acute sigmoid diverticulitis. No perforation or  abscess. Minimal surrounding stranding and wall thickening.     DICTATED:     03/03/2018  EDITED    :     03/03/2018                 Assessment/Plan   Assessment / Plan     Hospital Problem List     * (Principal)Sigmoid diverticulitis    Anxiety disorder    Gastroesophageal reflux disease    Hypertension    Overview Signed 1/17/2017 11:46 AM by Maia Melton MD     Description: dx approx 2000.         Hypothyroidism    Overview Signed 1/17/2017 11:46 AM by Maia Melton MD     Description: dx 4/14.         Macrocytosis    Leukocytosis            Assessment & Plan:  57 year old female presenting to the ED with complaint of recurrent abdominal pain after completing outpt abx treatment for diverticulitis that is found to have recurrent sigmoid  diverticulitis. \    Sigmoid Diverticulitis (FAILED OP THERAPY VS ANOTHER RECURENT EPISODE) HEMODYNAMICALLY STABLE/NONTOXIC.   -Flagyl and Merrem IV given in the ED, will continue  -Continue probiotic   -Consult Colorectal in am CONSIDER INTERVAL COLECTOMY   -IVF  -NPO  -Pain control  -Nausea control  -CBC, BMP in am    Leukocytosis/macrocytosis  -secondary to above  -BC x 2 pending  -CBC in am    Hypertension  -Continue home medication    Hyperlipidemia  -Continue home meds    General Anxiety  -Continue home medications    GERD  -Pepcid daily    DVT prophylaxis:  Heparin  -Teds/scds    CODE STATUS:  Full Code      Brief Attending Admission Attestation     I have seen and examined the patient, performing an independent face-to-face diagnostic evaluation with plan of care reviewed and developed with the advanced practice clinician (APC).      Brief Summary Statement/HPI:   Roz Randolph is a 57 y.o. female with PMH significant for extensive diverticular disease with multiple episodes of recurrent diverticulitis. This is the first hospitalization for it, but she been to the ED and treated in OP multiple times. Her last C'scope by Dr Brush about 3 years ago. Today she came in due to persistent worsening LLQ pain after she completed her abx (Cipro and Flagyl x 10 days, last dose 6 days ago-Mon) for diverticulitis. She denies any fever or chills, but had some nausea without vomiting. She reports that pain worsened with walking. CT A/P revealed     Attending Physical Exam:  General Assessment: No acute cardiopulmonary distress. Well developed and well nourished.    HEENT: NCAT, PERRL, MM dry    Neck: Supple    CVS: RRR, S1S2 normal, no murmurs    Resp: CTAB, no adventitious sound    Abd: Soft, tender LLQ, ND, normal BS, no guarding or peritoneal signs    Ext: No edema, both calves are symmetric and NTTP    Neuro: No facial asymmetry, speech clear    Skin: W/D/I. No rash.    Psych: Affect is appropriate    Brief  Assessment/Plan :  See above for further detailed assessment and plan developed with APC which I have reviewed and/or edited.      Electronically signed by Abi Prieto MD, 03/03/18, 8:49 PM.       Admission Status:  I believe this patient meets INPATIENT status due to the need for care which can only be reasonably provided in an hospital setting such as aggressive/expedited ancillary services and/or consultation services, the necessity for IV medications, close physician monitoring and/or the possible need for procedures.  In such, I feel patient’s risk for adverse outcomes and need for care warrant INPATIENT evaluation and predict the patient’s care encounter to likely last beyond 2 midnights.      Electronically signed by CONNIE Dowling, 03/03/18, 7:30 PM.

## 2018-03-04 NOTE — PROGRESS NOTES
[facility] Medicine Services  PROGRESS NOTE    Patient Name: Roz Randolph  : 1960  MRN: [id number]    Date of Admission: 3/3/2018  Length of Stay: 1  Primary Care Physician: Maia Melton MD    Subjective   Subjective     CC:  Abdominal pain    HPI:  Seems like she has been effected for a month now.  She improved some with a ten day course of abx.  This has made her worry about traveling.  It sounds like this is the 10th episode of diverticulitis.    Review of Systems  Gen- No fevers, chills  CV- No chest pain, palpitations  Resp- No cough, dyspnea  GI- No N/V/D, + abd pain    Otherwise ROS is negative except as mentioned in the HPI.    Objective   Objective     Vital Signs:   Temp:  [98.4 °F (36.9 °C)-99.4 °F (37.4 °C)] 98.8 °F (37.1 °C)  Heart Rate:  [] 66  Resp:  [16-20] 18  BP: (125-182)/(81-97) 135/88     Physical Exam:  Constitutional: No acute distress, awake, alert  HENT: NCAT, dry tongue  Respiratory: Clear to auscultation bilaterally, respiratory effort normal   Cardiovascular: RRR, no murmurs, rubs, or gallops, palpable pedal pulses bilaterally  Gastrointestinal: Positive bowel sounds, soft, tenderness to superficial palpation, + rebound tenderness  Musculoskeletal: No bilateral ankle edema  Psychiatric: Appropriate affect, cooperative  Neurologic: Oriented x 3, strength symmetric in all extremities, Cranial Nerves grossly intact to confrontation, speech clear  Skin: No rashes    Results Reviewed:  I have personally reviewed current lab, radiology, and data and agree.      Results from last 7 days  Lab Units 18  0432 18  1700   WBC 10*3/mm3 7.27 12.93*   HEMOGLOBIN g/dL 10.9* 13.4   HEMATOCRIT % 33.2* 40.0   PLATELETS 10*3/mm3 263 356       Results from last 7 days  Lab Units 18  0432 18  1700 18  0814   SODIUM mmol/L 140 138  --    POTASSIUM mmol/L 3.6 3.9  --    CHLORIDE mmol/L 108 103  --    CO2 mmol/L 27.0 28.0  --    BUN  mg/dL 10 13  --    CREATININE mg/dL 0.70 0.70  --    GLUCOSE mg/dL 100 90  --    CALCIUM mg/dL 8.3* 9.6  --    ALT (SGPT) U/L  --  45* 70*   AST (SGOT) U/L  --  24 42*     Estimated Creatinine Clearance: 89.2 mL/min (by C-G formula based on Cr of 0.7).  No results found for: BNP  No results found for: PHART    Microbiology Results Abnormal     Procedure Component Value - Date/Time    Blood Culture - Blood, [[id number]]  (Normal) Collected:  03/03/18 1746    Lab Status:  Preliminary result Specimen:  Blood from Wrist, Right Updated:  03/04/18 0601     Blood Culture No growth at less than 24 hours    Blood Culture - Blood, [[id number]]  (Normal) Collected:  03/03/18 1746    Lab Status:  Preliminary result Specimen:  Blood from Arm, Left Updated:  03/04/18 0601     Blood Culture No growth at less than 24 hours          Imaging Results (last 24 hours)     Procedure Component Value Units Date/Time    CT Abdomen Pelvis With Contrast [[id number]] Collected:  03/03/18 1900     Updated:  03/03/18 1900    Narrative:       EXAMINATION: CT ABDOMEN PELVIS W/CONTRAST - 03/03/2018      INDICATION: K57.32-Diverticulitis of large intestine without perforation  or abscess without bleeding; R10.32-Left lower quadrant pain;  R03.0-Elevated blood-pressure reading, without diagnosis of  hypertension. Left lower quadrant pain, nausea, diarrhea.     TECHNIQUE: Multiple axial CT imaging is obtained of the abdomen and  pelvis following the administration of intravenous contrast.     The radiation dose reduction device was turned on for each scan per the  ALARA (As Low as Reasonably Achievable) protocol.     COMPARISON: 01/21/2017,     FINDINGS:      ABDOMEN: Several noncalcified nodules identified at the right and left  lung base, 4 mm or less in size suggesting noncalcified granulomas.  These are stable when compared to the prior study of 2017. Liver is  homogeneous in appearance. The spleen is unremarkable. Kidneys and  adrenal glands within  normal limits. The pancreas is homogeneous. Small  hiatal hernia. There has been surgical removal of the gallbladder. No  abdominal or retroperitoneal lymphadenopathy. The abdominal portion of  the gastrointestinal tract is within normal limits.     PELVIS: Abnormal wall thickening and stranding are seen surrounding the  proximal sigmoid colon suggesting an acute sigmoid diverticulitis.  Minimal fluid identified in the left paracolic gutter. There is no  abscess or free air to suggest perforation. The pelvic organs are  unremarkable. The gastrointestinal tract is  otherwise within normal  limits. There is no pelvic adenopathy. Bony structures reveal minimal  degenerative changes seen within the spine and pelvis.       Impression:       Mild acute sigmoid diverticulitis. No perforation or  abscess. Minimal surrounding stranding and wall thickening.     DICTATED:     03/03/2018  EDITED    :     03/03/2018            I have reviewed the medications.    Assessment/Plan   Assessment / Plan     Hospital Problem List     * (Principal)Sigmoid diverticulitis    Anxiety disorder    Gastroesophageal reflux disease    Hypertension    Overview Signed 1/17/2017 11:46 AM by Maia Melton MD     Description: dx approx 2000.         Hypothyroidism    Overview Signed 1/17/2017 11:46 AM by Maia Melton MD     Description: dx 4/14.         Macrocytosis    Leukocytosis         Brief Hospital Course to date:  Roz Randolph is a 57 y.o. female with history of recurrent diverticulitis.  It sounds like she has had about 10 episodes of diverticulitis and now it is effecting her quality of life.    It appears she may have failed an outpatient regimen of oral abx prior to this presentation.    Assessment & Plan:    Acute on Chronic / Recurrent Diverticulitis  - she was just treated with cipro/flagyl for 10 days  - it is affecting her ability to go on vacation  - it is unclear if this is multiple bouts of recurrent diverticulitis or  smoldering of her inflammation that never completely resolves  - appears like she is interested in surgery  - CRS evaluation  Peritonitis  - rebound tenderness on exam  - serial abdominal exams  Hypothyroidism  - TSH within normal limits  - continue levothyroxine      DVT Prophylaxis:  Heparin 5,000 Units SC every 8 hours    CODE STATUS: Full Code    Disposition: I expect the patient to be discharged to be determined      Electronically signed by Jared Hawk MD, 03/04/18, 1:40 PM.

## 2018-03-04 NOTE — CONSULTS
[facility]  Patient Care Team:  Maia Melton MD as PCP - General    Chief complaint: Diverticulitis    History of Present Illness: 57-year-old female with a history of multiple bouts of diverticulitis.  It sounds as though she has chronic diverticulitis and has a flare every 3-4 months.    On every ninth patient noticed an onset of left lower quadrant abdominal pain which she attributed to her usual diverticulitis.  She was seen by her primary care physician who started her on Cipro and Flagyl.  This seemed to alleviate her symptoms, but once antibiotic stopped symptoms recurred.  Yesterday pain increased and she had a fever so she came to the emergency room.    White blood count is normal at 6.  CT scan of the abdomen and pelvis reveals stranding around the sigmoid colon with multiple diverticuli.    Colonoscopy performed about 3 years ago by Dr. Alessio Brush revealed significant diverticular disease by patient report.    At this point pt. Desires to have surgery to take care of her chronic diverticulitis.    PMD: Dr. Melton.    Principal Problems:  Principal Problem:    Sigmoid diverticulitis  Active Problems:    Anxiety disorder    Gastroesophageal reflux disease    Hypertension    Hypothyroidism    Macrocytosis    Leukocytosis      Review of Systems   Pertinent items are noted in HPI    History  Past Medical History:   Diagnosis Date   • Abnormal liver enzymes    • Acute diverticulitis 09/2013    sigmoid colon dx by CT (also episodes 6/14, 9/14, and 12/14)   • Anxiety disorder    • Closed fracture of distal phalanx or phalanges of hand 11/2013    Of the fourth finger, right   • Closed head injury 06/2014    CT negative   • Diverticulosis of large intestine    • Gastroesophageal reflux disease    • Heart murmur    • History of esophagogastroduodenoscopy (EGD) 12/21/2015    small hiatal hernia, mild duodenitis, and concentric espohageal rings suggestive of esophsgitis- path c/w mild chronic esophagitis w/ increased  "eosinophils suggestive of reflux (Schnider)   • History of varicella    • Hyperlipidemia     Description: dx 7/08.   • Hypertension     Description: dx approx 2000.   • Hypothyroidism     Description: dx 4/14.   • Lung nodule     Description: 9/13- RLL (4 mm) nodule).  5/2/14- stable.     Past Surgical History:   Procedure Laterality Date   • APPENDECTOMY  1952   • CHOLECYSTECTOMY  1993   • TOTAL ABDOMINAL HYSTERECTOMY WITH SALPINGO OOPHORECTOMY Bilateral 02/2004     Family History   Problem Relation Age of Onset   • Diabetes Mother    • Hypertension Mother    • Hypothyroidism Mother    • Coronary artery disease Father 47     MI age 47 and 65   • Hypertension Father    • Hypothyroidism Sister    • Diabetes Maternal Aunt    • Breast cancer Maternal Aunt    • Coronary artery disease Maternal Grandmother    • Diabetes Maternal Grandmother    • Colon cancer Paternal Grandmother    • Coronary artery disease Paternal Grandfather    • Hypothyroidism Sister    • Ovarian cancer Neg Hx      Social History   Substance Use Topics   • Smoking status: Never Smoker   • Smokeless tobacco: Never Used   • Alcohol use Yes      Comment: twice weekly     Prescriptions Prior to Admission   Medication Sig Dispense Refill Last Dose   • buPROPion XL (WELLBUTRIN XL) 150 MG 24 hr tablet Take 1 tablet by mouth Every Morning. 90 tablet 3 Taking   • levothyroxine (SYNTHROID, LEVOTHROID) 75 MCG tablet Take 1 tablet by mouth Daily. 90 tablet 1 Taking   • losartan-hydrochlorothiazide (HYZAAR) 100-12.5 MG per tablet Take 1 tablet by mouth Daily. 90 tablet 3 Taking     Allergies:  Augmentin [amoxicillin-pot clavulanate]; Morphine and related; and Tetracyclines & related    Objective     Vital Signs  Blood pressure 135/88, pulse 66, temperature 98.8 °F (37.1 °C), temperature source Oral, resp. rate 18, height 160 cm (63\"), weight 76.9 kg (169 lb 9.6 oz), SpO2 95 %.    Physical Exam:      General Appearance:    Alert, cooperative, in no acute distress "   Head:    Normocephalic, without obvious abnormality, atraumatic   Eyes:            Lids and lashes normal, conjunctivae and sclerae normal, no   icterus, no pallor, corneas clear, PERRLA   Ears:    Ears appear intact with no abnormalities noted   Throat:   No oral lesions, no thrush, oral mucosa moist   Neck:   No adenopathy, supple, trachea midline, no thyromegaly, no     carotid bruit, no JVD   Back:     No kyphosis present, no scoliosis present, no skin lesions,       erythema or scars, no tenderness to percussion or                   palpation,   range of motion normal   Lungs:     Clear to auscultation,respirations regular, even and                   unlabored    Heart:    Regular rhythm and normal rate, normal S1 and S2, no            murmur, no gallop, no rub, no click   Breast Exam:    Deferred   Abdomen:     Normal bowel sounds, no masses, no organomegaly, soft        non-tender, non-distended, no guarding, no rebound                 Tenderness.  Well healed pfanensteil incision from previous MO/BSO.  Laparoscopic marks from Appy and CCY.   Genitalia:    Deferred   Extremities:   Moves all extremities well, no edema, no cyanosis, no              redness   Pulses:   Pulses palpable and equal bilaterally   Skin:   No bleeding, bruising or rash   Lymph nodes:   No palpable adenopathy   Neurologic:   Cranial nerves 2 - 12 grossly intact, sensation intact, DTR        present and equal bilaterally     Rectal exam: deferred, not clinically indicated.    Results Review:    I reviewed the patient's new clinical results.    Assessment and Plapatient with recurrent bouts of diverticulitis.  This sounds as though it is uncomplicated chronic diverticulitis.  At this point patient is tired of having recurrent symptoms.  She expressed concern that she can never go on vacation without fearing she's going to have an attack of diverticulitis.    Today I spent a good deal of time talking with the patient and her   who accompanies her about robotic low anterior resection as primary management.  I described the risks, benefits, alternatives of this with the patient.  She understands and desires to proceed as soon as she has recovered from her current bout of diverticulitis.    Current recommendations be to continue IV antibiotics until peritoneal irritation has resolved.  I am okay with advancing diet at this point.  Upon discharge I would recommend Cipro and Flagyl and close follow-up with me in the office.  We will pick a surgical date when patient's symptoms have resolved.           I discussed the patients findings and my recommendations with patient, family and primary care team.     Travis Shaw MD  03/04/18  12:32 PM    Time: More than 50% of time spent in counseling and coordination of care:  Total face-to-face/floor time 45 min.  Time spent in counseling 45 min. Counseling included the following topics: Diverticulitis and its surgical managment.

## 2018-03-04 NOTE — PLAN OF CARE
Problem: Pain, Acute (Adult)  Goal: Acceptable Pain Control/Comfort Level  Outcome: Ongoing (interventions implemented as appropriate)   03/04/18 5416   Pain, Acute (Adult)   Acceptable Pain Control/Comfort Level making progress toward outcome

## 2018-03-04 NOTE — PLAN OF CARE
Problem: Pain, Acute (Adult)  Goal: Identify Related Risk Factors and Signs and Symptoms  Outcome: Ongoing (interventions implemented as appropriate)   03/04/18 0428   Pain, Acute   Related Risk Factors (Acute Pain) disease process   Signs and Symptoms (Acute Pain) alteration in muscle tone;verbalization of pain descriptors     Goal: Acceptable Pain Control/Comfort Level  Outcome: Ongoing (interventions implemented as appropriate)   03/04/18 0428   Pain, Acute (Adult)   Acceptable Pain Control/Comfort Level making progress toward outcome       Problem: Anxiety (Adult)  Goal: Identify Related Risk Factors and Signs and Symptoms  Outcome: Ongoing (interventions implemented as appropriate)   03/04/18 0428   Anxiety   Related Risk Factors (Anxiety) pain     Goal: Reduction/Resolution  Outcome: Ongoing (interventions implemented as appropriate)   03/04/18 0428   Anxiety (Adult)   Reduction/Resolution making progress toward outcome

## 2018-03-05 PROCEDURE — 99232 SBSQ HOSP IP/OBS MODERATE 35: CPT | Performed by: HOSPITALIST

## 2018-03-05 PROCEDURE — 25010000002 HEPARIN (PORCINE) PER 1000 UNITS: Performed by: HOSPITALIST

## 2018-03-05 PROCEDURE — 25010000002 MEROPENEM: Performed by: NURSE PRACTITIONER

## 2018-03-05 RX ORDER — DEXTROSE AND SODIUM CHLORIDE 5; .45 G/100ML; G/100ML
150 INJECTION, SOLUTION INTRAVENOUS CONTINUOUS
Status: DISCONTINUED | OUTPATIENT
Start: 2018-03-05 | End: 2018-03-06 | Stop reason: HOSPADM

## 2018-03-05 RX ORDER — ACETAMINOPHEN, ASPIRIN AND CAFFEINE 250; 250; 65 MG/1; MG/1; MG/1
2 TABLET, FILM COATED ORAL EVERY 6 HOURS PRN
Status: DISCONTINUED | OUTPATIENT
Start: 2018-03-05 | End: 2018-03-06 | Stop reason: HOSPADM

## 2018-03-05 RX ORDER — METHOCARBAMOL 750 MG/1
1500 TABLET, FILM COATED ORAL EVERY 8 HOURS SCHEDULED
Status: DISCONTINUED | OUTPATIENT
Start: 2018-03-05 | End: 2018-03-06 | Stop reason: HOSPADM

## 2018-03-05 RX ADMIN — Medication 250 MG: at 08:26

## 2018-03-05 RX ADMIN — DEXTROSE AND SODIUM CHLORIDE 150 ML/HR: 5; 450 INJECTION, SOLUTION INTRAVENOUS at 21:40

## 2018-03-05 RX ADMIN — METRONIDAZOLE 500 MG: 500 INJECTION, SOLUTION INTRAVENOUS at 08:55

## 2018-03-05 RX ADMIN — LEVOTHYROXINE SODIUM 75 MCG: 75 TABLET ORAL at 06:29

## 2018-03-05 RX ADMIN — HYDROCHLOROTHIAZIDE 12.5 MG: 12.5 TABLET ORAL at 08:26

## 2018-03-05 RX ADMIN — POTASSIUM CHLORIDE, DEXTROSE MONOHYDRATE AND SODIUM CHLORIDE 125 ML/HR: 150; 5; 450 INJECTION, SOLUTION INTRAVENOUS at 04:17

## 2018-03-05 RX ADMIN — MEROPENEM 1 G: 1 INJECTION, POWDER, FOR SOLUTION INTRAVENOUS at 08:54

## 2018-03-05 RX ADMIN — BUPROPION HYDROCHLORIDE 150 MG: 150 TABLET, FILM COATED, EXTENDED RELEASE ORAL at 08:26

## 2018-03-05 RX ADMIN — HEPARIN SODIUM 5000 UNITS: 5000 INJECTION, SOLUTION INTRAVENOUS; SUBCUTANEOUS at 06:29

## 2018-03-05 RX ADMIN — ACETAMINOPHEN, ASPIRIN AND CAFFEINE 2 TABLET: 250; 250; 65 TABLET, FILM COATED ORAL at 15:44

## 2018-03-05 RX ADMIN — Medication 250 MG: at 21:21

## 2018-03-05 RX ADMIN — LOSARTAN POTASSIUM 100 MG: 50 TABLET ORAL at 08:27

## 2018-03-05 RX ADMIN — HYDROCODONE BITARTRATE AND ACETAMINOPHEN 1 TABLET: 5; 325 TABLET ORAL at 15:44

## 2018-03-05 RX ADMIN — ACETAMINOPHEN, ASPIRIN AND CAFFEINE 2 TABLET: 250; 250; 65 TABLET, FILM COATED ORAL at 00:39

## 2018-03-05 RX ADMIN — HEPARIN SODIUM 5000 UNITS: 5000 INJECTION, SOLUTION INTRAVENOUS; SUBCUTANEOUS at 15:43

## 2018-03-05 RX ADMIN — METRONIDAZOLE 500 MG: 500 INJECTION, SOLUTION INTRAVENOUS at 18:03

## 2018-03-05 RX ADMIN — HEPARIN SODIUM 5000 UNITS: 5000 INJECTION, SOLUTION INTRAVENOUS; SUBCUTANEOUS at 21:21

## 2018-03-05 RX ADMIN — FAMOTIDINE 40 MG: 20 TABLET, FILM COATED ORAL at 08:26

## 2018-03-05 RX ADMIN — HYDROCODONE BITARTRATE AND ACETAMINOPHEN 1 TABLET: 5; 325 TABLET ORAL at 21:39

## 2018-03-05 RX ADMIN — METRONIDAZOLE 500 MG: 500 INJECTION, SOLUTION INTRAVENOUS at 02:17

## 2018-03-05 RX ADMIN — METHOCARBAMOL 1500 MG: 750 TABLET ORAL at 22:42

## 2018-03-05 RX ADMIN — MEROPENEM 1 G: 1 INJECTION, POWDER, FOR SOLUTION INTRAVENOUS at 15:45

## 2018-03-05 RX ADMIN — HYDROCODONE BITARTRATE AND ACETAMINOPHEN 1 TABLET: 5; 325 TABLET ORAL at 08:27

## 2018-03-05 NOTE — PLAN OF CARE
Problem: Pain, Acute (Adult)  Goal: Identify Related Risk Factors and Signs and Symptoms  Outcome: Ongoing (interventions implemented as appropriate)   03/04/18 3446   Pain, Acute   Related Risk Factors (Acute Pain) disease process   Signs and Symptoms (Acute Pain) alteration in muscle tone;verbalization of pain descriptors

## 2018-03-05 NOTE — PROGRESS NOTES
No fever and pt. States she is feeling better.  She did have a head ache, but maty. PO's.  Exam:  Remains tender in the LLQ.  Abdomen is otherwise soft.  Imp:  Chronic uncomplicated diverticulitis.  Plan:  I think that if pt. Desires to go home that would be ok with 10 days of cipro and flagyl.  I will follow up with her in the office next Tuesday.  If she is no better at that point then I will plan on IV abx as an outpatient for 10 days.  Pt. Desires interval colectomy and I will be making arrangements for this.

## 2018-03-05 NOTE — PROGRESS NOTES
Discharge Planning Assessment  [facility]     Patient Name: Roz Randolph  MRN: [id number]  Today's Date: 3/5/2018    Admit Date: 3/3/2018          Discharge Needs Assessment       03/05/18 1129    Living Environment    Lives With spouse    Living Arrangements house    Type of Financial/Environmental Concern none    Transportation Available car;family or friend will provide    Living Environment Comment Pt. lives in a house in [facility] with her .    Living Environment    Provides Primary Care For no one    Quality Of Family Relationships supportive;helpful;involved    Able to Return to Prior Living Arrangements yes    Living Arrangement Comments Pt. plans to return home at discharge.    Discharge Needs Assessment    Concerns To Be Addressed denies needs/concerns at this time    Readmission Within The Last 30 Days no previous admission in last 30 days    Equipment Currently Used at Home none    Equipment Needed After Discharge none    Current Discharge Risk other (see comments)   Pt. stated she will require surgery in the future and is concerned she may require IV antibiotics if the oral antibiotics do not work well enough.    Discharge Contact Information if Applicable [phone] is pt's cell phone.    Discharge Planning Comments Pt. plans to return home today with her spouse.  She does not use home health services and does not use DME.  Pt.'s family will provide transportation at discharge.            Discharge Plan       03/05/18 1136    Case Management/Social Work Plan    Plan home with spouse    Patient/Family In Agreement With Plan yes    Additional Comments SW met with pt. at bedside.  Her spouse, son, and daughter-in-law were present with permission.  Pt. denies having any discharge needs and plans to return home at discharge.        Discharge Placement     No information found                Demographic Summary       03/05/18 1124    Referral Information    Admission Type inpatient     Referral Source admission list    Reason For Consult discharge planning    Primary Care Physician Information    Name Maia Melton MD            Functional Status       03/05/18 1128    Employment/Financial    Employment/Finance Comments Pt. has insurance and prescription coverage through [employer].            Psychosocial     None            Abuse/Neglect     None            Legal     None            Substance Abuse     None            Patient Forms     None          BRENDA Borges

## 2018-03-06 VITALS
HEART RATE: 59 BPM | HEIGHT: 63 IN | SYSTOLIC BLOOD PRESSURE: 149 MMHG | BODY MASS INDEX: 30.05 KG/M2 | TEMPERATURE: 98.1 F | RESPIRATION RATE: 18 BRPM | OXYGEN SATURATION: 95 % | WEIGHT: 169.6 LBS | DIASTOLIC BLOOD PRESSURE: 89 MMHG

## 2018-03-06 PROBLEM — D72.829 LEUKOCYTOSIS: Status: RESOLVED | Noted: 2018-03-03 | Resolved: 2018-03-06

## 2018-03-06 PROCEDURE — 25010000002 ERTAPENEM: Performed by: INTERNAL MEDICINE

## 2018-03-06 PROCEDURE — 99239 HOSP IP/OBS DSCHRG MGMT >30: CPT | Performed by: NURSE PRACTITIONER

## 2018-03-06 PROCEDURE — 25010000002 MEROPENEM: Performed by: NURSE PRACTITIONER

## 2018-03-06 RX ORDER — METHOCARBAMOL 750 MG/1
750 TABLET, FILM COATED ORAL EVERY 8 HOURS SCHEDULED
Qty: 20 TABLET | Refills: 0 | Status: SHIPPED | OUTPATIENT
Start: 2018-03-06 | End: 2018-06-11

## 2018-03-06 RX ORDER — SACCHAROMYCES BOULARDII 250 MG
250 CAPSULE ORAL 2 TIMES DAILY
Qty: 30 CAPSULE | Refills: 0 | Status: SHIPPED | OUTPATIENT
Start: 2018-03-06 | End: 2018-06-11

## 2018-03-06 RX ORDER — HYDROCODONE BITARTRATE AND ACETAMINOPHEN 5; 325 MG/1; MG/1
1 TABLET ORAL EVERY 6 HOURS PRN
Qty: 12 TABLET | Refills: 0 | Status: SHIPPED | OUTPATIENT
Start: 2018-03-06 | End: 2018-06-11

## 2018-03-06 RX ADMIN — LEVOTHYROXINE SODIUM 75 MCG: 75 TABLET ORAL at 05:20

## 2018-03-06 RX ADMIN — HYDROCHLOROTHIAZIDE 12.5 MG: 12.5 TABLET ORAL at 09:45

## 2018-03-06 RX ADMIN — MEROPENEM 1 G: 1 INJECTION, POWDER, FOR SOLUTION INTRAVENOUS at 01:38

## 2018-03-06 RX ADMIN — METHOCARBAMOL 1500 MG: 750 TABLET ORAL at 05:20

## 2018-03-06 RX ADMIN — METRONIDAZOLE 500 MG: 500 INJECTION, SOLUTION INTRAVENOUS at 09:47

## 2018-03-06 RX ADMIN — ERTAPENEM SODIUM 1 G: 1 INJECTION, POWDER, LYOPHILIZED, FOR SOLUTION INTRAMUSCULAR; INTRAVENOUS at 09:45

## 2018-03-06 RX ADMIN — LOSARTAN POTASSIUM 100 MG: 50 TABLET ORAL at 09:44

## 2018-03-06 RX ADMIN — METRONIDAZOLE 500 MG: 500 INJECTION, SOLUTION INTRAVENOUS at 02:47

## 2018-03-06 RX ADMIN — FAMOTIDINE 40 MG: 20 TABLET, FILM COATED ORAL at 09:45

## 2018-03-06 RX ADMIN — Medication 250 MG: at 09:44

## 2018-03-06 RX ADMIN — BUPROPION HYDROCHLORIDE 150 MG: 150 TABLET, FILM COATED, EXTENDED RELEASE ORAL at 09:45

## 2018-03-06 NOTE — PLAN OF CARE
Problem: Pain, Acute (Adult)  Goal: Identify Related Risk Factors and Signs and Symptoms  Outcome: Ongoing (interventions implemented as appropriate)   03/04/18 0428   Pain, Acute   Related Risk Factors (Acute Pain) disease process   Signs and Symptoms (Acute Pain) alteration in muscle tone;verbalization of pain descriptors     Goal: Acceptable Pain Control/Comfort Level  Outcome: Ongoing (interventions implemented as appropriate)   03/06/18 0151   Pain, Acute (Adult)   Acceptable Pain Control/Comfort Level making progress toward outcome       Problem: Anxiety (Adult)  Goal: Identify Related Risk Factors and Signs and Symptoms  Outcome: Ongoing (interventions implemented as appropriate)   03/06/18 0151   Anxiety   Related Risk Factors (Anxiety) pain;health status change   Signs and Symptoms (Anxiety) nervousness/tension/restlessness     Goal: Reduction/Resolution  Outcome: Ongoing (interventions implemented as appropriate)   03/06/18 0151   Anxiety (Adult)   Reduction/Resolution making progress toward outcome

## 2018-03-06 NOTE — PROGRESS NOTES
[facility]  PROGRESS NOTE    Patient Name: Roz Randolph  : 1960  MRN: [id number]    Date of Admission: 3/3/2018  Length of Stay: 2  Primary Care Physician: Maia Melton MD    Subjective   Subjective     CC:  Abdominal pain    HPI:  Still having some pain.  Had a headache.  Not eating many calories.   in room.  Needs some time for inflammation to cool.  Discussed changing fluids today.    Review of Systems  Gen- No fevers, chills  CV- No chest pain, palpitations  Resp- No cough, dyspnea  GI- No N/V/D, + abd pain    Otherwise ROS is negative except as mentioned in the HPI.    Objective   Objective     Vital Signs:   Temp:  [97.3 °F (36.3 °C)-98 °F (36.7 °C)] 98 °F (36.7 °C)  Heart Rate:  [63-74] 69  Resp:  [18-20] 18  BP: (133-153)/() 135/92     Physical Exam:  Constitutional: No acute distress, awake, alert  HENT: NCAT, dry tongue  Respiratory: Clear to auscultation bilaterally, respiratory effort normal   Cardiovascular: RRR, no murmurs, rubs, or gallops, palpable pedal pulses bilaterally  Gastrointestinal: Positive bowel sounds, soft, tenderness to superficial palpation, + rebound tenderness  Musculoskeletal: No bilateral ankle edema  Psychiatric: Appropriate affect, cooperative  Neurologic: Oriented x 3, strength symmetric in all extremities, Cranial Nerves grossly intact to confrontation, speech clear  Skin: No rashes    Results Reviewed:  I have personally reviewed current lab, radiology, and data and agree.      Results from last 7 days  Lab Units 18  0432 18  1700   WBC 10*3/mm3 7.27 12.93*   HEMOGLOBIN g/dL 10.9* 13.4   HEMATOCRIT % 33.2* 40.0   PLATELETS 10*3/mm3 263 356       Results from last 7 days  Lab Units 18  0432 18  1700   SODIUM mmol/L 140 138   POTASSIUM mmol/L 3.6 3.9   CHLORIDE mmol/L 108 103   CO2 mmol/L 27.0 28.0   BUN mg/dL 10 13   CREATININE mg/dL 0.70 0.70   GLUCOSE mg/dL 100 90   CALCIUM mg/dL  8.3* 9.6   ALT (SGPT) U/L  --  45*   AST (SGOT) U/L  --  24     Estimated Creatinine Clearance: 89.2 mL/min (by C-G formula based on Cr of 0.7).  No results found for: BNP  No results found for: PHART    Microbiology Results Abnormal     Procedure Component Value - Date/Time    Blood Culture - Blood, [[id number]]  (Normal) Collected:  03/03/18 1746    Lab Status:  Preliminary result Specimen:  Blood from Wrist, Right Updated:  03/05/18 1801     Blood Culture No growth at 2 days    Blood Culture - Blood, [[id number]]  (Normal) Collected:  03/03/18 1746    Lab Status:  Preliminary result Specimen:  Blood from Arm, Left Updated:  03/05/18 1801     Blood Culture No growth at 2 days          Imaging Results (last 24 hours)     ** No results found for the last 24 hours. **        I have reviewed the medications.    Assessment/Plan   Assessment / Plan     Hospital Problem List     * (Principal)Sigmoid diverticulitis    Anxiety disorder    Gastroesophageal reflux disease    Hypertension    Overview Signed 1/17/2017 11:46 AM by Maia Melton MD     Description: dx approx 2000.         Hypothyroidism    Overview Signed 1/17/2017 11:46 AM by Maia Melton MD     Description: dx 4/14.         Macrocytosis    Leukocytosis         Brief Hospital Course to date:  Roz Randolph is a 57 y.o. female with history of recurrent diverticulitis.  It sounds like she has had about 10 episodes of diverticulitis and now it is effecting her quality of life.    It appears she may have failed an outpatient regimen of oral abx prior to this presentation.    Assessment & Plan:    Acute on Chronic / Recurrent Diverticulitis  - she was just treated with cipro/flagyl for 10 days  - it is affecting her ability to go on vacation  - it is unclear if this is multiple bouts of recurrent diverticulitis or smoldering of her inflammation that never completely resolves  - appears like she is interested in surgery  - will likely need surgery  Peritonitis  -  rebound tenderness on exam  - serial abdominal exams  Hypothyroidism  - TSH within normal limits  - continue levothyroxine    Dextrose fluids at higher rate  Trial of robaxin    DVT Prophylaxis:  Heparin 5,000 Units SC every 8 hours    CODE STATUS: Full Code    Disposition: I expect the patient to be discharged possibly home tomorrow on oral abx      Electronically signed by Jared Hawk MD, 03/05/18, 8:32 PM.

## 2018-03-06 NOTE — DISCHARGE SUMMARY
[facility] Medicine Services  DISCHARGE SUMMARY    Patient Name: Roz Randolph  : 1960  MRN: [id number]    Date of Admission: 3/3/2018  Date of Discharge:  3/6/2018  Primary Care Physician: Maia Melton MD    Consults     Date and Time Order Name Status Description    3/6/2018 0731 Inpatient Infectious Diseases Consult Completed     3/6/2018 0719 Inpatient Infectious Diseases Consult Completed     3/3/2018 2000 Inpatient Colorectal Surgery Consult Completed         Hospital Course     Presenting Problem:   Sigmoid diverticulitis [K57.32]    Active Hospital Problems (** Indicates Principal Problem)    Diagnosis Date Noted   • **Sigmoid diverticulitis [K57.32] 2018   • Macrocytosis [D75.89] 2018   • Hypothyroidism [E03.9] 2017   • Hypertension [I10] 2017   • Gastroesophageal reflux disease [K21.9] 2017   • Anxiety disorder [F41.9] 2017      Resolved Hospital Problems    Diagnosis Date Noted Date Resolved   • Leukocytosis [D72.829] 2018          Hospital Course:  Roz Randolph is a 57 y.o. female with history of recurrent diverticulitis.  Reported approximately 10 episodes of diverticulitis recently.  Failed outpatient oral antibiotic regimen and presents to [facility] ER on 3/3/18 with chief complaint of abdominal pain.  Ported 1 month history of ongoing abdominal pain and persistent diverticulitis flare.  She been treated outpatient with Cipro and Flagyl improved but not fully resolved and acutely symptoms increase again.  Low-grade fever on admit she had reported due to her significant history it was recommended that she had a colon resection but had never followed up with a surgeon.  In ER she was found to have sigmoid diverticulitis per CT of the abdomen.  She was admitted to hospital medicine service for further evaluation and management.  Colorectal surgery was consulted and followed.  Plans for surgery in the near future  "however surgery deferring until this diverticular flare improved.  Infectious disease followed for antibiotic management.  Slowly improved.  Her diet was advanced this morning.  She tolerated solid bland diet with no nausea, vomiting or increasing pain.  She is seen today resting upright in bed awake, alert and smiling.   at bedside.  States that she feels much better and is requiring pain medicine less often today.  We discussed possibility of remaining in hospital overnight and de- escalating pain meds and monitoring on new advance diet versus discharge home today.  Patient desires to discharge home today.  Dr. Barger with ID has arranged for daily Invanz infusions at [facility] office via peripheral IV.  She will receive her first dose and hospital today and follow-up for her first outpatient dose in the ID office tomorrow at 9:15 AM and then daily as per ID recs.  Follow-up with Dr. Barger on 3/8 for further eval.  Low up with colorectal surgery, Dr. Shaw in 1 week.  PCP 1 week of discharge.  Return to ER for any worsening of symptoms.           Day of Discharge     HPI:   Pt is seen at 1105 am resting upright in bed in NAD.   at bs.  States she feels much better today.  Tolerating new soft GI diet this am without difficulty.  No issues with IV abx.  Abd pain improved now 3/10.  Smiling.  States no new issues.  Excited to get better and have elective sx \"so I can finally feel better\".  Wants to dc home.      Review of Systems  Gen- No fevers, chills  CV- No chest pain, palpitations  Resp- No cough, dyspnea  GI- No N/V/D, abd pain      Otherwise ROS is negative except as mentioned in the HPI.    Vital Signs:   Temp:  [98 °F (36.7 °C)-98.1 °F (36.7 °C)] 98.1 °F (36.7 °C)  Heart Rate:  [59-69] 59  Resp:  [18] 18  BP: (135-149)/(89-94) 149/89     Physical Exam:  Constitutional: No acute distress, awake, alert.  Sitting upright in bed.   at bs  HENT: NCAT, mucous membranes moist  Respiratory: " Clear to auscultation bilaterally A/P, respiratory effort normal on RA  Cardiovascular: RRR, no murmurs, rubs, or gallops, palpable pedal pulses bilaterally  Gastrointestinal: Positive bowel sounds, soft, mild ttp but much improved per report and pt, nondistended.  No rebound.    Musculoskeletal: No bilateral ankle edema.  JON spont  Psychiatric: Appropriate affect, cooperative  Neurologic: Oriented x 3, strength symmetric in all extremities, Cranial Nerves grossly intact to confrontation, speech clear.  Follows commands   Skin: No rashes      Pertinent  and/or Most Recent Results       Results from last 7 days  Lab Units 03/04/18  0432 03/03/18  1700   WBC 10*3/mm3 7.27 12.93*   HEMOGLOBIN g/dL 10.9* 13.4   HEMATOCRIT % 33.2* 40.0   PLATELETS 10*3/mm3 263 356   SODIUM mmol/L 140 138   POTASSIUM mmol/L 3.6 3.9   CHLORIDE mmol/L 108 103   CO2 mmol/L 27.0 28.0   BUN mg/dL 10 13   CREATININE mg/dL 0.70 0.70   GLUCOSE mg/dL 100 90   CALCIUM mg/dL 8.3* 9.6       Results from last 7 days  Lab Units 03/03/18  1700   BILIRUBIN mg/dL 0.6   ALK PHOS U/L 43   ALT (SGPT) U/L 45*   AST (SGOT) U/L 24           Invalid input(s): TG, LDLCALC, LDLREALC    Results from last 7 days  Lab Units 03/04/18  0432   TSH mIU/mL 1.209     Brief Urine Lab Results  (Last result in the past 365 days)      Color   Clarity   Blood   Leuk Est   Nitrite   Protein   CREAT   Urine HCG        03/03/18 1708 Yellow Clear Negative Trace(A) Negative Negative               Microbiology Results Abnormal     Procedure Component Value - Date/Time    Blood Culture - Blood, [[id number]]  (Normal) Collected:  03/03/18 1746    Lab Status:  Preliminary result Specimen:  Blood from Wrist, Right Updated:  03/05/18 1801     Blood Culture No growth at 2 days    Blood Culture - Blood, [[id number]]  (Normal) Collected:  03/03/18 1746    Lab Status:  Preliminary result Specimen:  Blood from Arm, Left Updated:  03/05/18 1801     Blood Culture No growth at 2 days           Imaging Results (all)     Procedure Component Value Units Date/Time    CT Abdomen Pelvis With Contrast [[id number]] Collected:  03/03/18 1900     Updated:  03/04/18 1444    Narrative:       EXAMINATION: CT ABDOMEN PELVIS W/CONTRAST - 03/03/2018      INDICATION: K57.32-Diverticulitis of large intestine without perforation  or abscess without bleeding; R10.32-Left lower quadrant pain;  R03.0-Elevated blood-pressure reading, without diagnosis of  hypertension. Left lower quadrant pain, nausea, diarrhea.     TECHNIQUE: Multiple axial CT imaging is obtained of the abdomen and  pelvis following the administration of intravenous contrast.     The radiation dose reduction device was turned on for each scan per the  ALARA (As Low as Reasonably Achievable) protocol.     COMPARISON: 01/21/2017,     FINDINGS:      ABDOMEN: Several noncalcified nodules identified at the right and left  lung base, 4 mm or less in size suggesting noncalcified granulomas.  These are stable when compared to the prior study of 2017. Liver is  homogeneous in appearance. The spleen is unremarkable. Kidneys and  adrenal glands within normal limits. The pancreas is homogeneous. Small  hiatal hernia. There has been surgical removal of the gallbladder. No  abdominal or retroperitoneal lymphadenopathy. The abdominal portion of  the gastrointestinal tract is within normal limits.     PELVIS: Abnormal wall thickening and stranding are seen surrounding the  proximal sigmoid colon suggesting an acute sigmoid diverticulitis.  Minimal fluid identified in the left paracolic gutter. There is no  abscess or free air to suggest perforation. The pelvic organs are  unremarkable. The gastrointestinal tract is  otherwise within normal  limits. There is no pelvic adenopathy. Bony structures reveal minimal  degenerative changes seen within the spine and pelvis.       Impression:       Mild acute sigmoid diverticulitis. No perforation or  abscess. Minimal surrounding  stranding and wall thickening.     DICTATED:     03/03/2018  EDITED    :     03/03/2018      This report was finalized on 3/4/2018 2:42 PM by Dr. Kristin Patterson MD.                             Order Current Status    Blood Culture - Blood, Preliminary result    Blood Culture - Blood, Preliminary result        Discharge Details      Roz Randolph   Home Medication Instructions YONI:[id number]    Printed on:03/06/18 5357   Medication Information                      buPROPion XL (WELLBUTRIN XL) 150 MG 24 hr tablet  Take 1 tablet by mouth Every Morning.             ertapenem (INVanz) 1 g/100 mL 0.9% NS VTB (mbp)  Infuse 100 mL into a venous catheter Daily for 9 doses. Per [facility]  Indications: Infection Within the Abdomen             HYDROcodone-acetaminophen (NORCO) 5-325 MG per tablet  Take 1 tablet by mouth Every 6 (Six) Hours As Needed for Moderate Pain .             levothyroxine (SYNTHROID, LEVOTHROID) 75 MCG tablet  Take 1 tablet by mouth Daily.             losartan-hydrochlorothiazide (HYZAAR) 100-12.5 MG per tablet  Take 1 tablet by mouth Daily.             methocarbamol (ROBAXIN) 750 MG tablet  Take 1 tablet by mouth Every 8 (Eight) Hours.             saccharomyces boulardii (FLORASTOR) 250 MG capsule  Take 1 capsule by mouth 2 (Two) Times a Day.                   Discharge Disposition:  Home or Self Care    Discharge Diet:  Diet Instructions     Diet: Regular, Cardiac, Soft Texture; Thin Liquids, No Restrictions; Whole; Thin       Discharge Diet:   Regular  Cardiac  Soft Texture      Fluid Consistency:  Thin Liquids, No Restrictions   Soft Options:  Whole   Fluid Consistency:  Thin   GI soft/bland as tolerated                 Discharge Activity:   Activity Instructions     Activity as Tolerated                     Future Appointments  Date Time Provider Department Center   6/11/2018 7:45 AM Maia Melton MD [facility] None       Additional Instructions for the Follow-ups that You Need to Schedule      Discharge Follow-up with PCP    As directed    Follow Up Details:  1 week           Discharge Follow-up with Specialty: Dr Shaw 1 week; 1 Week    As directed    Specialty:  Dr Shaw 1 week    Follow Up:  1 Week           Discharge Follow-up with Specified Provider: Daily IV abx infusions at [facility], next dose tomorrow 3/7 at 9:15 am    As directed    To:  Daily IV abx infusions at [facility], next dose tomorrow 3/7 at 9:15 am           Discharge Follow-up with Specified Provider: Dr Barger Thursday 3/8 at 9:45 am    As directed    To:  Dr Barger Thursday 3/8 at 9:45 am                     Time Spent on Discharge:  45 minutes    Electronically signed by CONNIE Anne, 03/06/18, 12:59 PM.

## 2018-03-06 NOTE — PROGRESS NOTES
Continued Stay Note   [facility]     Patient Name: Roz Randolph  MRN: [id number]  Today's Date: 3/6/2018    Admit Date: 3/3/2018          Discharge Plan       03/06/18 1215    Case Management/Social Work Plan    Plan Discharge home with spouse, follow-up with [facility]    Patient/Family In Agreement With Plan yes    Additional Comments LOREN met with pt. and her spouse this morning to discuss discharge planning.  Pt. again states that she does not have any discharge needs, such as home health or DME.  LOREN discussed discharge and follow-up appointments with Suki at [facility] ([phone]). Pt. is scheduled to begin her infusion at [facility] on Wednesday, 3/7/18 at 9:15am.  Pt. is scheduled to see Dr. Barger at [facility] for follow-up on Thursday, 3/8/18 at 9:45am.  LOREN has also provided update to RN.               Discharge Codes     None            BRENDA Borges

## 2018-03-06 NOTE — CONSULTS
Roz Randolph  1960  [id number]    Date of Consult: 3/6/2018    Admit Date:  3/3/2018      Requesting Provider: No ref. provider found  Evaluating Physician: Calvin Barger MD    Chief Complaint: abd pain    Reason for Consultation: diverticulitis    History of present illness:    Patient is a 57 y.o.  Yr old female with history of chronic/recurrent/multiple bouts of diverticulitis/abdominal pain over multiple years before and after her last colonoscopy which was done 3 years ago.  At that point she reports she was told to consider surgical resection but she declined.  She has continued to have frequent bouts of abdominal pain presumed to be diverticulitis every 3-4 months.  Most recently, February 2018 she received empiric oral Cipro/Flagyl for 2 week course and has continued to have left lower quadrant pain.  She reported acute onset/worsening of pain had become sharp/progressive and left lower quadrant, 8 out of 10 at its worse and associated with low-grade fever/nausea and poor by mouth intake.  She was admitted March 3, 2018 with CT scan showing acute sigmoid diverticulitis but no perforation or abscess and no fistula described.  Empiric IV antibiotics initiated.    She continues to have abdominal pain which is left lower quadrant, intermittent, worse with palpation, better at rest, crampy at times, sharp at others, 5-8 out of 10 at its worst when the abdomen is pushed on.  No hematochezia melena or hematemesis.    No headache photophobia or neck stiffness.  No shortness of breath cough or hemoptysis.  No dysuria hematuria or pyuria.  No air in her urine and no fecaluria    No raw or undercooked food.  No unpasteurized milk or milk products.  No animal insect or arthropod exposure.  No tick bites.  No outdoor camping or hunting exposure.  No travel exposure.  No ill exposure.  No history of TB or TB exposure.   Denies a history of MRSA/VRE and no history of C. difficile or ESBL/KPC   organisms.    Past Medical History:   Diagnosis Date   • Abnormal liver enzymes    • Acute diverticulitis 09/2013    sigmoid colon dx by CT (also episodes 6/14, 9/14, and 12/14)   • Anxiety disorder    • Closed fracture of distal phalanx or phalanges of hand 11/2013    Of the fourth finger, right   • Closed head injury 06/2014    CT negative   • Diverticulosis of large intestine    • Gastroesophageal reflux disease    • Heart murmur    • History of esophagogastroduodenoscopy (EGD) 12/21/2015    small hiatal hernia, mild duodenitis, and concentric espohageal rings suggestive of esophsgitis- path c/w mild chronic esophagitis w/ increased eosinophils suggestive of reflux ([place])   • History of varicella    • Hyperlipidemia     Description: dx 7/08.   • Hypertension     Description: dx approx 2000.   • Hypothyroidism     Description: dx 4/14.   • Lung nodule     Description: 9/13- RLL (4 mm) nodule).  5/2/14- stable.       Past Surgical History:   Procedure Laterality Date   • APPENDECTOMY  1952   • CHOLECYSTECTOMY  1993   • TOTAL ABDOMINAL HYSTERECTOMY WITH SALPINGO OOPHORECTOMY Bilateral 02/2004       Pediatric History   Patient Guardian Status   • Not on file.     Other Topics Concern   • Not on file     Social History Narrative   • No narrative on file   She lives in [place] with no tobacco alcohol or illicit drugs    family history includes Breast cancer in her maternal aunt; Colon cancer in her paternal grandmother; Coronary artery disease in her maternal grandmother and paternal grandfather; Coronary artery disease (age of onset: 47) in her father; Diabetes in her maternal aunt, maternal grandmother, and mother; Hypertension in her father and mother; Hypothyroidism in her mother, sister, and sister. There is no history of Ovarian cancer.    Allergies   Allergen Reactions   • Augmentin [Amoxicillin-Pot Clavulanate] Rash   • Morphine And Related Rash   • Tetracyclines & Related Rash, GI Intolerance and  Swelling       Medication:  Current Facility-Administered Medications   Medication Dose Route Frequency Provider Last Rate Last Dose   • acetaminophen (TYLENOL) tablet 650 mg  650 mg Oral Q4H PRN Abi Prieto MD       • aspirin-acetaminophen-caffeine (EXCEDRIN MIGRAINE) per tablet 2 tablet  2 tablet Oral Q6H PRN Radha Daniela, APRN   2 tablet at 03/05/18 1544   • buPROPion XL (WELLBUTRIN XL) 24 hr tablet 150 mg  150 mg Oral QAM Abi Prieto MD   150 mg at 03/05/18 0826   • dextrose 5 % and sodium chloride 0.45 % infusion  150 mL/hr Intravenous Continuous Jared Hawk  mL/hr at 03/05/18 2140 150 mL/hr at 03/05/18 2140   • famotidine (PEPCID) tablet 40 mg  40 mg Oral Daily Abi Prieto MD   40 mg at 03/05/18 0826   • heparin (porcine) 5000 UNIT/ML injection 5,000 Units  5,000 Units Subcutaneous Q8H Abi Prieto MD   5,000 Units at 03/05/18 2121   • losartan (COZAAR) tablet 100 mg  100 mg Oral Q24H Abi Prieto MD   100 mg at 03/05/18 0827    And   • hydrochlorothiazide (HYDRODIURIL) tablet 12.5 mg  12.5 mg Oral Daily Abi Prieto MD   12.5 mg at 03/05/18 0826   • HYDROcodone-acetaminophen (NORCO) 5-325 MG per tablet 1 tablet  1 tablet Oral Q4H PRN Abi Prieto MD   1 tablet at 03/05/18 2139   • HYDROmorphone (DILAUDID) injection 0.5 mg  0.5 mg Intravenous Q2H PRN Abi Prieto MD   0.5 mg at 03/04/18 0456    And   • naloxone (NARCAN) injection 0.4 mg  0.4 mg Intravenous Q5 Min PRN Abi Prieto MD       • levothyroxine (SYNTHROID, LEVOTHROID) tablet 75 mcg  75 mcg Oral Q AM Abi Prieto MD   75 mcg at 03/06/18 0520   • meropenem (MERREM) 1 g/100 mL 0.9% NS VTB (mbp)  1 g Intravenous Q8H CONNIE Dowling   1 g at 03/06/18 0138   • methocarbamol (ROBAXIN) tablet 1,500 mg  1,500 mg Oral Q8H Jared Hawk MD   1,500 mg at 03/06/18 0520   • metroNIDAZOLE (FLAGYL) IVPB 500 mg  500 mg Intravenous Q8H Hyacinth DE LEON  CONNIE White   500 mg at 03/06/18 0247   • ondansetron (ZOFRAN) injection 4 mg  4 mg Intravenous Q6H PRN Abi Prieto MD       • saccharomyces boulardii (FLORASTOR) capsule 250 mg  250 mg Oral BID Larry Marquez MD   250 mg at 03/05/18 2121   • sennosides-docusate sodium (SENOKOT-S) 8.6-50 MG tablet 2 tablet  2 tablet Oral Nightly PRN Abi Prieto MD       • sodium chloride 0.9 % flush 1-10 mL  1-10 mL Intravenous PRN Abi Prieto MD       • sodium chloride 0.9 % flush 10 mL  10 mL Intravenous PRN Triage Protocol Emergency, MD           Antibiotics:  Anti-Infectives     Ordered     Dose/Rate Route Frequency Start Stop    03/03/18 2001  metroNIDAZOLE (FLAGYL) IVPB 500 mg     Comments:  First dose given in ED   Ordering Provider:  CONNIE Dowling    500 mg  over 60 Minutes Intravenous Every 8 Hours 03/04/18 0200 03/09/18 0159    03/03/18 2000  meropenem (MERREM) 1 g/100 mL 0.9% NS VTB (mbp)     Comments:  First dose given in ED   Ordering Provider:  CONNIE Dowling    1 g  over 3 Hours Intravenous Every 8 Hours 03/04/18 0000 03/08/18 2359    03/03/18 1811  metroNIDAZOLE (FLAGYL) IVPB 500 mg     Ordering Provider:  Larry Marquez MD    500 mg  over 60 Minutes Intravenous Once 03/03/18 1813 03/03/18 1933    03/03/18 1811  meropenem (MERREM) 1 g/100 mL 0.9% NS VTB (mbp)     Ordering Provider:  Larry Marquez MD    1 g  over 30 Minutes Intravenous Once 03/03/18 1813 03/03/18 1900            Review of Systems    Constitutional-- At present No Fever, chills or sweats.  Appetite Diminished with generalized fatigue  Heent-- No new vision, hearing or throat complaints.  No epistaxis or oral sores.  Denies odynophagia or dysphagia.  No flashers, floaters or eye pain. No odynophagia or dysphagia. No headache, photophobia or neck stiffness.  CV-- No chest pain, palpitation or syncope  Resp-- No SOB/cough/Hemoptysis  GI- No vomiting, or diarrhea.  No hematochezia, melena,  "or hematemesis. Denies jaundice or chronic liver disease.  -- No dysuria, hematuria, or flank pain.  Denies hesitancy, urgency or flank pain.  Lymph- no swollen lymph nodes in neck/axilla or groin.   Heme- No active bruising or bleeding; no Hx of DVT or PE.  MS-- no swelling or pain in the bones or joints of arms/legs.  No new back pain.  Neuro-- No acute focal weakness or numbness in the arms or legs.  No seizures.    Full 12 point review of systems reviewed and negative otherwise for acute complaints, except for above    Physical Exam: Nursing/chaperone present  Vital Signs   /92  Pulse 69  Temp 98 °F (36.7 °C) (Oral)   Resp 18  Ht 160 cm (63\")  Wt 76.9 kg (169 lb 9.6 oz)  SpO2 95%  BMI 30.04 kg/m2    GENERAL: Awake and alert, in no acute distress.   HEENT: Normocephalic, atraumatic.  PERRL. EOMI. No conjunctival injection. No icterus. Oropharynx clear without evidence of thrush or exudate. No evidence of peridontal disease.    NECK: Supple without nuchal rigidity. No mass.  LYMPH: No cervical, axillary or inguinal lymphadenopathy.  HEART: RRR; No murmur, rubs, gallops.   LUNGS: Clear to auscultation bilaterally without wheezing, rales, rhonchi. Normal respiratory effort. Nonlabored. No dullness.  ABDOMEN: Soft, left lower quadrant tender, nondistended. Positive bowel sounds. No rebound or guarding. NO mass or HSM.  EXT:  No cyanosis, clubbing or edema. No cord.  : Genitalia generally unremarkable.  Without Pinzon catheter.  MSK: FROM without joint effusions noted arms/legs.    SKIN: Warm and dry without cutaneous eruptions on Inspection/palpation.    NEURO: Oriented to PPT. No focal deficits on motor/sensory exam at arms/legs.  PSYCHIATRIC: Normal insight and judgement. Cooperative with PE    No peripheral stigmata/phenomena of endocarditis    Laboratory Data      Results from last 7 days  Lab Units 03/04/18  0432 03/03/18  1700   WBC 10*3/mm3 7.27 12.93*   HEMOGLOBIN g/dL 10.9* 13.4   HEMATOCRIT " % 33.2* 40.0   PLATELETS 10*3/mm3 263 356       Results from last 7 days  Lab Units 03/04/18  0432   SODIUM mmol/L 140   POTASSIUM mmol/L 3.6   CHLORIDE mmol/L 108   CO2 mmol/L 27.0   BUN mg/dL 10   CREATININE mg/dL 0.70   GLUCOSE mg/dL 100   CALCIUM mg/dL 8.3*       Results from last 7 days  Lab Units 03/03/18  1700   ALK PHOS U/L 43   BILIRUBIN mg/dL 0.6   ALT (SGPT) U/L 45*   AST (SGOT) U/L 24               Estimated Creatinine Clearance: 89.2 mL/min (by C-G formula based on Cr of 0.7).      Microbiology:      Radiology:  Imaging Results (last 72 hours)     Procedure Component Value Units Date/Time    CT Abdomen Pelvis With Contrast [[id number]] Collected:  03/03/18 1900     Updated:  03/04/18 1444    Narrative:       EXAMINATION: CT ABDOMEN PELVIS W/CONTRAST - 03/03/2018      INDICATION: K57.32-Diverticulitis of large intestine without perforation  or abscess without bleeding; R10.32-Left lower quadrant pain;  R03.0-Elevated blood-pressure reading, without diagnosis of  hypertension. Left lower quadrant pain, nausea, diarrhea.     TECHNIQUE: Multiple axial CT imaging is obtained of the abdomen and  pelvis following the administration of intravenous contrast.     The radiation dose reduction device was turned on for each scan per the  ALARA (As Low as Reasonably Achievable) protocol.     COMPARISON: 01/21/2017,     FINDINGS:      ABDOMEN: Several noncalcified nodules identified at the right and left  lung base, 4 mm or less in size suggesting noncalcified granulomas.  These are stable when compared to the prior study of 2017. Liver is  homogeneous in appearance. The spleen is unremarkable. Kidneys and  adrenal glands within normal limits. The pancreas is homogeneous. Small  hiatal hernia. There has been surgical removal of the gallbladder. No  abdominal or retroperitoneal lymphadenopathy. The abdominal portion of  the gastrointestinal tract is within normal limits.     PELVIS: Abnormal wall thickening and  stranding are seen surrounding the  proximal sigmoid colon suggesting an acute sigmoid diverticulitis.  Minimal fluid identified in the left paracolic gutter. There is no  abscess or free air to suggest perforation. The pelvic organs are  unremarkable. The gastrointestinal tract is  otherwise within normal  limits. There is no pelvic adenopathy. Bony structures reveal minimal  degenerative changes seen within the spine and pelvis.       Impression:       Mild acute sigmoid diverticulitis. No perforation or  abscess. Minimal surrounding stranding and wall thickening.     DICTATED:     03/03/2018  EDITED    :     03/03/2018      This report was finalized on 3/4/2018 2:42 PM by Dr. Kristin Patterson MD.               Impression:   --Acute sepsis at presentation with tachycardia, leukocytosis and fever with acute intra-abdominal infection with sigmoid diverticulitis.  Sepsis parameters improved.    --Acute sigmoid diverticulitis, compounded by probable chronic diverticulitis and frequent flares, recent empiric oral antibiotics and not resolved with that oral regimen.  Symptoms improving, sepsis parameters better with carbapenem; continue IV Invanz, likely transition to outpatient IV antibiotics in the office.  Surgical resection timing/option and threshold per Dr. Shaw; patient is considering her options.    --Chronic abnormal LFTs per patient.  Specific etiology unclear to me.  His would need to be clarified further by her PCP.  Any further decision regarding workup by some specialists at [facility] be left to them    PLAN: Thank you for asking us to see Roz Randolph, I recommend the following:    --IV Invanz/Flagyl    --I discussed potential risks and benefits of the prescribed antibiotics that include, but are not limited to, solid organ toxicity, neuro toxicity, renal toxicity, CDiff, cytopenias, hypersensitivity,  etc.. Patient/Family voice understanding and agree to proceed.    --Monitor IV and IV antibiotic  with risk for systemic complication and potential drug interaction    --Check/review labs cultures and scans    --Highly complex set of issues with high risk for further serious morbidity and other serious sequela    --History per nursing.  Discussed with microbiology.    --Likely will come to the office daily for Invanz 1 g IV daily.  Follow-up with me tomorrow or Thursday depending on when she gets discharged.    --d/w Dr Kenn Barger MD  3/6/2018

## 2018-03-06 NOTE — PROGRESS NOTES
Pt. Noting good improvement with abdominal pain today.  Remains on clears.  Has been able to get out of bed and walk.  Exam:  Abdominal tenderness improved.  Imp:  Chronic diverticulitis.  Failed oral abx as an outpatient.  Slow to improve with IV abx.  Plan:  I think ok to go home today with IV abx. Per ID.  Pt. Lives in [place] and would be willing to stop by to get her abx daily.  I am concerned that if she goes home with oral abx. That she has a high chance of bouncing back, given that she has failed outpatient management with oral cipro/flagyl prior to this hospitalization.  Pt. Is aware that she needs surgery to address her diverticulitis.    If we can set up IV abx. As an outpatient, then I am ok with discharge and will follow up with the patient next week in the office to pick out a surgery date.    OK to have soft GI diet today.    Travis Shaw

## 2018-03-07 ENCOUNTER — TRANSITIONAL CARE MANAGEMENT TELEPHONE ENCOUNTER (OUTPATIENT)
Dept: INTERNAL MEDICINE | Facility: CLINIC | Age: 58
End: 2018-03-07

## 2018-03-08 LAB
BACTERIA SPEC AEROBE CULT: NORMAL
BACTERIA SPEC AEROBE CULT: NORMAL

## 2018-03-09 ENCOUNTER — TRANSCRIBE ORDERS (OUTPATIENT)
Dept: LAB | Facility: HOSPITAL | Age: 58
End: 2018-03-09

## 2018-03-09 ENCOUNTER — LAB (OUTPATIENT)
Dept: LAB | Facility: HOSPITAL | Age: 58
End: 2018-03-09

## 2018-03-09 DIAGNOSIS — K57.91 DIVERTICULOSIS OF INTESTINE WITH BLEEDING, UNSPECIFIED INTESTINAL TRACT LOCATION: ICD-10-CM

## 2018-03-09 DIAGNOSIS — K57.91 DIVERTICULOSIS OF INTESTINE WITH BLEEDING, UNSPECIFIED INTESTINAL TRACT LOCATION: Primary | ICD-10-CM

## 2018-03-09 LAB
ALBUMIN SERPL-MCNC: 4.2 G/DL (ref 3.2–4.8)
ALBUMIN/GLOB SERPL: 1.4 G/DL (ref 1.5–2.5)
ALP SERPL-CCNC: 40 U/L (ref 25–100)
ALT SERPL W P-5'-P-CCNC: 49 U/L (ref 7–40)
ANION GAP SERPL CALCULATED.3IONS-SCNC: 7 MMOL/L (ref 3–11)
AST SERPL-CCNC: 35 U/L (ref 0–33)
BASOPHILS # BLD AUTO: 0.02 10*3/MM3 (ref 0–0.2)
BASOPHILS NFR BLD AUTO: 0.3 % (ref 0–1)
BILIRUB SERPL-MCNC: 0.4 MG/DL (ref 0.3–1.2)
BUN BLD-MCNC: 14 MG/DL (ref 9–23)
BUN/CREAT SERPL: 20 (ref 7–25)
CALCIUM SPEC-SCNC: 9.6 MG/DL (ref 8.7–10.4)
CHLORIDE SERPL-SCNC: 105 MMOL/L (ref 99–109)
CO2 SERPL-SCNC: 29 MMOL/L (ref 20–31)
CREAT BLD-MCNC: 0.7 MG/DL (ref 0.6–1.3)
DEPRECATED RDW RBC AUTO: 43.7 FL (ref 37–54)
EOSINOPHIL # BLD AUTO: 0.17 10*3/MM3 (ref 0–0.3)
EOSINOPHIL NFR BLD AUTO: 2.7 % (ref 0–3)
ERYTHROCYTE [DISTWIDTH] IN BLOOD BY AUTOMATED COUNT: 12.1 % (ref 11.3–14.5)
GFR SERPL CREATININE-BSD FRML MDRD: 87 ML/MIN/1.73
GLOBULIN UR ELPH-MCNC: 2.9 GM/DL
GLUCOSE BLD-MCNC: 83 MG/DL (ref 70–100)
HCT VFR BLD AUTO: 42.8 % (ref 34.5–44)
HGB BLD-MCNC: 14 G/DL (ref 11.5–15.5)
IMM GRANULOCYTES # BLD: 0.01 10*3/MM3 (ref 0–0.03)
IMM GRANULOCYTES NFR BLD: 0.2 % (ref 0–0.6)
LYMPHOCYTES # BLD AUTO: 1.64 10*3/MM3 (ref 0.6–4.8)
LYMPHOCYTES NFR BLD AUTO: 25.7 % (ref 24–44)
MCH RBC QN AUTO: 33 PG (ref 27–31)
MCHC RBC AUTO-ENTMCNC: 32.7 G/DL (ref 32–36)
MCV RBC AUTO: 100.9 FL (ref 80–99)
MONOCYTES # BLD AUTO: 0.54 10*3/MM3 (ref 0–1)
MONOCYTES NFR BLD AUTO: 8.5 % (ref 0–12)
NEUTROPHILS # BLD AUTO: 3.99 10*3/MM3 (ref 1.5–8.3)
NEUTROPHILS NFR BLD AUTO: 62.6 % (ref 41–71)
PLATELET # BLD AUTO: 328 10*3/MM3 (ref 150–450)
PMV BLD AUTO: 10.7 FL (ref 6–12)
POTASSIUM BLD-SCNC: 4.6 MMOL/L (ref 3.5–5.5)
PROT SERPL-MCNC: 7.1 G/DL (ref 5.7–8.2)
RBC # BLD AUTO: 4.24 10*6/MM3 (ref 3.89–5.14)
SODIUM BLD-SCNC: 141 MMOL/L (ref 132–146)
WBC NRBC COR # BLD: 6.37 10*3/MM3 (ref 3.5–10.8)

## 2018-03-09 PROCEDURE — 80053 COMPREHEN METABOLIC PANEL: CPT

## 2018-03-09 PROCEDURE — 85025 COMPLETE CBC W/AUTO DIFF WBC: CPT

## 2018-03-12 ENCOUNTER — TRANSCRIBE ORDERS (OUTPATIENT)
Dept: LAB | Facility: HOSPITAL | Age: 58
End: 2018-03-12

## 2018-03-12 ENCOUNTER — APPOINTMENT (OUTPATIENT)
Dept: LAB | Facility: HOSPITAL | Age: 58
End: 2018-03-12

## 2018-03-12 DIAGNOSIS — K57.32 DIVERTICULITIS OF LARGE INTESTINE, UNSPECIFIED BLEEDING STATUS, UNSPECIFIED COMPLICATION STATUS: Primary | ICD-10-CM

## 2018-03-12 LAB
ALBUMIN SERPL-MCNC: 4.4 G/DL (ref 3.2–4.8)
ALBUMIN/GLOB SERPL: 1.6 G/DL (ref 1.5–2.5)
ALP SERPL-CCNC: 39 U/L (ref 25–100)
ALT SERPL W P-5'-P-CCNC: 43 U/L (ref 7–40)
ANION GAP SERPL CALCULATED.3IONS-SCNC: 6 MMOL/L (ref 3–11)
AST SERPL-CCNC: 27 U/L (ref 0–33)
BASOPHILS # BLD AUTO: 0.05 10*3/MM3 (ref 0–0.2)
BASOPHILS NFR BLD AUTO: 0.8 % (ref 0–1)
BILIRUB SERPL-MCNC: 0.4 MG/DL (ref 0.3–1.2)
BUN BLD-MCNC: 12 MG/DL (ref 9–23)
BUN/CREAT SERPL: 20 (ref 7–25)
CALCIUM SPEC-SCNC: 9.4 MG/DL (ref 8.7–10.4)
CHLORIDE SERPL-SCNC: 105 MMOL/L (ref 99–109)
CO2 SERPL-SCNC: 28 MMOL/L (ref 20–31)
CREAT BLD-MCNC: 0.6 MG/DL (ref 0.6–1.3)
CRP SERPL-MCNC: 0.46 MG/DL (ref 0–1)
DEPRECATED RDW RBC AUTO: 43.2 FL (ref 37–54)
EOSINOPHIL # BLD AUTO: 0.2 10*3/MM3 (ref 0–0.3)
EOSINOPHIL NFR BLD AUTO: 3.2 % (ref 0–3)
ERYTHROCYTE [DISTWIDTH] IN BLOOD BY AUTOMATED COUNT: 12.1 % (ref 11.3–14.5)
ERYTHROCYTE [SEDIMENTATION RATE] IN BLOOD: 14 MM/HR (ref 0–30)
GFR SERPL CREATININE-BSD FRML MDRD: 104 ML/MIN/1.73
GLOBULIN UR ELPH-MCNC: 2.8 GM/DL
GLUCOSE BLD-MCNC: 80 MG/DL (ref 70–100)
HCT VFR BLD AUTO: 38.1 % (ref 34.5–44)
HGB BLD-MCNC: 13.2 G/DL (ref 11.5–15.5)
IMM GRANULOCYTES # BLD: 0.01 10*3/MM3 (ref 0–0.03)
IMM GRANULOCYTES NFR BLD: 0.2 % (ref 0–0.6)
LYMPHOCYTES # BLD AUTO: 2.09 10*3/MM3 (ref 0.6–4.8)
LYMPHOCYTES NFR BLD AUTO: 33 % (ref 24–44)
MCH RBC QN AUTO: 34.2 PG (ref 27–31)
MCHC RBC AUTO-ENTMCNC: 34.6 G/DL (ref 32–36)
MCV RBC AUTO: 98.7 FL (ref 80–99)
MONOCYTES # BLD AUTO: 0.61 10*3/MM3 (ref 0–1)
MONOCYTES NFR BLD AUTO: 9.6 % (ref 0–12)
NEUTROPHILS # BLD AUTO: 3.38 10*3/MM3 (ref 1.5–8.3)
NEUTROPHILS NFR BLD AUTO: 53.2 % (ref 41–71)
PLATELET # BLD AUTO: 323 10*3/MM3 (ref 150–450)
PMV BLD AUTO: 10.8 FL (ref 6–12)
POTASSIUM BLD-SCNC: 4.4 MMOL/L (ref 3.5–5.5)
PROT SERPL-MCNC: 7.2 G/DL (ref 5.7–8.2)
RBC # BLD AUTO: 3.86 10*6/MM3 (ref 3.89–5.14)
SODIUM BLD-SCNC: 139 MMOL/L (ref 132–146)
WBC NRBC COR # BLD: 6.34 10*3/MM3 (ref 3.5–10.8)

## 2018-03-12 PROCEDURE — 86140 C-REACTIVE PROTEIN: CPT | Performed by: INTERNAL MEDICINE

## 2018-03-12 PROCEDURE — 80053 COMPREHEN METABOLIC PANEL: CPT | Performed by: INTERNAL MEDICINE

## 2018-03-12 PROCEDURE — 85025 COMPLETE CBC W/AUTO DIFF WBC: CPT | Performed by: INTERNAL MEDICINE

## 2018-03-12 PROCEDURE — 36415 COLL VENOUS BLD VENIPUNCTURE: CPT | Performed by: INTERNAL MEDICINE

## 2018-03-12 PROCEDURE — 85652 RBC SED RATE AUTOMATED: CPT | Performed by: INTERNAL MEDICINE

## 2018-03-19 ENCOUNTER — TRANSCRIBE ORDERS (OUTPATIENT)
Dept: LAB | Facility: HOSPITAL | Age: 58
End: 2018-03-19

## 2018-03-19 ENCOUNTER — LAB (OUTPATIENT)
Dept: LAB | Facility: HOSPITAL | Age: 58
End: 2018-03-19
Attending: INTERNAL MEDICINE

## 2018-03-19 DIAGNOSIS — K57.32 DIVERTICULITIS OF LARGE INTESTINE WITHOUT PERFORATION OR ABSCESS WITHOUT BLEEDING: Primary | ICD-10-CM

## 2018-03-19 DIAGNOSIS — K57.32 DIVERTICULITIS OF LARGE INTESTINE WITHOUT PERFORATION OR ABSCESS WITHOUT BLEEDING: ICD-10-CM

## 2018-03-19 LAB
ALBUMIN SERPL-MCNC: 4.4 G/DL (ref 3.2–4.8)
ALBUMIN/GLOB SERPL: 1.6 G/DL (ref 1.5–2.5)
ALP SERPL-CCNC: 50 U/L (ref 25–100)
ALT SERPL W P-5'-P-CCNC: 50 U/L (ref 7–40)
ANION GAP SERPL CALCULATED.3IONS-SCNC: 10 MMOL/L (ref 3–11)
AST SERPL-CCNC: 30 U/L (ref 0–33)
BASOPHILS # BLD AUTO: 0.03 10*3/MM3 (ref 0–0.2)
BASOPHILS NFR BLD AUTO: 0.3 % (ref 0–1)
BILIRUB SERPL-MCNC: 0.5 MG/DL (ref 0.3–1.2)
BUN BLD-MCNC: 13 MG/DL (ref 9–23)
BUN/CREAT SERPL: 18.6 (ref 7–25)
CALCIUM SPEC-SCNC: 9.3 MG/DL (ref 8.7–10.4)
CHLORIDE SERPL-SCNC: 102 MMOL/L (ref 99–109)
CO2 SERPL-SCNC: 27 MMOL/L (ref 20–31)
CREAT BLD-MCNC: 0.7 MG/DL (ref 0.6–1.3)
CRP SERPL-MCNC: 0.53 MG/DL (ref 0–1)
DEPRECATED RDW RBC AUTO: 43.7 FL (ref 37–54)
EOSINOPHIL # BLD AUTO: 0.24 10*3/MM3 (ref 0–0.3)
EOSINOPHIL NFR BLD AUTO: 2.8 % (ref 0–3)
ERYTHROCYTE [DISTWIDTH] IN BLOOD BY AUTOMATED COUNT: 12.1 % (ref 11.3–14.5)
ERYTHROCYTE [SEDIMENTATION RATE] IN BLOOD: 15 MM/HR (ref 0–30)
GFR SERPL CREATININE-BSD FRML MDRD: 87 ML/MIN/1.73
GLOBULIN UR ELPH-MCNC: 2.8 GM/DL
GLUCOSE BLD-MCNC: 99 MG/DL (ref 70–100)
HCT VFR BLD AUTO: 40.8 % (ref 34.5–44)
HGB BLD-MCNC: 13.6 G/DL (ref 11.5–15.5)
IMM GRANULOCYTES # BLD: 0.02 10*3/MM3 (ref 0–0.03)
IMM GRANULOCYTES NFR BLD: 0.2 % (ref 0–0.6)
LYMPHOCYTES # BLD AUTO: 2.03 10*3/MM3 (ref 0.6–4.8)
LYMPHOCYTES NFR BLD AUTO: 23.6 % (ref 24–44)
MCH RBC QN AUTO: 33.3 PG (ref 27–31)
MCHC RBC AUTO-ENTMCNC: 33.3 G/DL (ref 32–36)
MCV RBC AUTO: 100 FL (ref 80–99)
MONOCYTES # BLD AUTO: 0.66 10*3/MM3 (ref 0–1)
MONOCYTES NFR BLD AUTO: 7.7 % (ref 0–12)
NEUTROPHILS # BLD AUTO: 5.62 10*3/MM3 (ref 1.5–8.3)
NEUTROPHILS NFR BLD AUTO: 65.4 % (ref 41–71)
PLATELET # BLD AUTO: 335 10*3/MM3 (ref 150–450)
PMV BLD AUTO: 10.4 FL (ref 6–12)
POTASSIUM BLD-SCNC: 4.5 MMOL/L (ref 3.5–5.5)
PROT SERPL-MCNC: 7.2 G/DL (ref 5.7–8.2)
RBC # BLD AUTO: 4.08 10*6/MM3 (ref 3.89–5.14)
SODIUM BLD-SCNC: 139 MMOL/L (ref 132–146)
WBC NRBC COR # BLD: 8.6 10*3/MM3 (ref 3.5–10.8)

## 2018-03-19 PROCEDURE — 85025 COMPLETE CBC W/AUTO DIFF WBC: CPT

## 2018-03-19 PROCEDURE — 85652 RBC SED RATE AUTOMATED: CPT

## 2018-03-19 PROCEDURE — 36415 COLL VENOUS BLD VENIPUNCTURE: CPT | Performed by: INTERNAL MEDICINE

## 2018-03-19 PROCEDURE — 80053 COMPREHEN METABOLIC PANEL: CPT

## 2018-03-19 PROCEDURE — 86140 C-REACTIVE PROTEIN: CPT | Performed by: INTERNAL MEDICINE

## 2018-03-28 ENCOUNTER — HOSPITAL ENCOUNTER (OUTPATIENT)
Dept: CT IMAGING | Facility: HOSPITAL | Age: 58
Discharge: HOME | End: 2018-03-28
Attending: INTERNAL MEDICINE | Admitting: INTERNAL MEDICINE

## 2018-03-28 ENCOUNTER — TRANSCRIBE ORDERS (OUTPATIENT)
Dept: LAB | Facility: HOSPITAL | Age: 58
End: 2018-03-28

## 2018-03-28 ENCOUNTER — TRANSCRIBE ORDERS (OUTPATIENT)
Dept: ADMINISTRATIVE | Facility: HOSPITAL | Age: 58
End: 2018-03-28

## 2018-03-28 ENCOUNTER — LAB (OUTPATIENT)
Dept: LAB | Facility: HOSPITAL | Age: 58
End: 2018-03-28

## 2018-03-28 DIAGNOSIS — K57.32 DIVERTICULITIS OF LARGE INTESTINE WITHOUT PERFORATION OR ABSCESS WITHOUT BLEEDING: Primary | ICD-10-CM

## 2018-03-28 DIAGNOSIS — K57.32 DIVERTICULITIS OF SIGMOID COLON: ICD-10-CM

## 2018-03-28 DIAGNOSIS — K57.32 DIVERTICULITIS OF LARGE INTESTINE WITHOUT PERFORATION OR ABSCESS WITHOUT BLEEDING: ICD-10-CM

## 2018-03-28 DIAGNOSIS — K57.32 DIVERTICULITIS OF SIGMOID COLON: Primary | ICD-10-CM

## 2018-03-28 LAB
ALBUMIN SERPL-MCNC: 4.2 G/DL (ref 3.2–4.8)
ALBUMIN/GLOB SERPL: 1.4 G/DL (ref 1.5–2.5)
ALP SERPL-CCNC: 62 U/L (ref 25–100)
ALT SERPL W P-5'-P-CCNC: 31 U/L (ref 7–40)
ANION GAP SERPL CALCULATED.3IONS-SCNC: 13 MMOL/L (ref 3–11)
AST SERPL-CCNC: 22 U/L (ref 0–33)
BASOPHILS # BLD AUTO: 0.01 10*3/MM3 (ref 0–0.2)
BASOPHILS NFR BLD AUTO: 0.1 % (ref 0–1)
BILIRUB SERPL-MCNC: 0.8 MG/DL (ref 0.3–1.2)
BUN BLD-MCNC: 9 MG/DL (ref 9–23)
BUN/CREAT SERPL: 12.9 (ref 7–25)
CALCIUM SPEC-SCNC: 9.6 MG/DL (ref 8.7–10.4)
CHLORIDE SERPL-SCNC: 98 MMOL/L (ref 99–109)
CO2 SERPL-SCNC: 25 MMOL/L (ref 20–31)
CREAT BLD-MCNC: 0.7 MG/DL (ref 0.6–1.3)
CRP SERPL-MCNC: 3.58 MG/DL (ref 0–1)
DEPRECATED RDW RBC AUTO: 43.8 FL (ref 37–54)
EOSINOPHIL # BLD AUTO: 0.21 10*3/MM3 (ref 0–0.3)
EOSINOPHIL NFR BLD AUTO: 2.2 % (ref 0–3)
ERYTHROCYTE [DISTWIDTH] IN BLOOD BY AUTOMATED COUNT: 12.2 % (ref 11.3–14.5)
ERYTHROCYTE [SEDIMENTATION RATE] IN BLOOD: 18 MM/HR (ref 0–30)
GFR SERPL CREATININE-BSD FRML MDRD: 87 ML/MIN/1.73
GLOBULIN UR ELPH-MCNC: 3.1 GM/DL
GLUCOSE BLD-MCNC: 80 MG/DL (ref 70–100)
HCT VFR BLD AUTO: 38.3 % (ref 34.5–44)
HGB BLD-MCNC: 12.8 G/DL (ref 11.5–15.5)
IMM GRANULOCYTES # BLD: 0.03 10*3/MM3 (ref 0–0.03)
IMM GRANULOCYTES NFR BLD: 0.3 % (ref 0–0.6)
LYMPHOCYTES # BLD AUTO: 1.31 10*3/MM3 (ref 0.6–4.8)
LYMPHOCYTES NFR BLD AUTO: 13.6 % (ref 24–44)
MCH RBC QN AUTO: 33.2 PG (ref 27–31)
MCHC RBC AUTO-ENTMCNC: 33.4 G/DL (ref 32–36)
MCV RBC AUTO: 99.5 FL (ref 80–99)
MONOCYTES # BLD AUTO: 0.86 10*3/MM3 (ref 0–1)
MONOCYTES NFR BLD AUTO: 8.9 % (ref 0–12)
NEUTROPHILS # BLD AUTO: 7.2 10*3/MM3 (ref 1.5–8.3)
NEUTROPHILS NFR BLD AUTO: 74.9 % (ref 41–71)
PLATELET # BLD AUTO: 283 10*3/MM3 (ref 150–450)
PMV BLD AUTO: 10.4 FL (ref 6–12)
POTASSIUM BLD-SCNC: 4.2 MMOL/L (ref 3.5–5.5)
PROT SERPL-MCNC: 7.3 G/DL (ref 5.7–8.2)
RBC # BLD AUTO: 3.85 10*6/MM3 (ref 3.89–5.14)
SODIUM BLD-SCNC: 136 MMOL/L (ref 132–146)
WBC NRBC COR # BLD: 9.62 10*3/MM3 (ref 3.5–10.8)

## 2018-03-28 PROCEDURE — 85025 COMPLETE CBC W/AUTO DIFF WBC: CPT

## 2018-03-28 PROCEDURE — 25010000002 IOPAMIDOL 61 % SOLUTION: Performed by: INTERNAL MEDICINE

## 2018-03-28 PROCEDURE — 0 DIATRIZOATE MEGLUMINE & SODIUM PER 1 ML: Performed by: INTERNAL MEDICINE

## 2018-03-28 PROCEDURE — 74177 CT ABD & PELVIS W/CONTRAST: CPT

## 2018-03-28 PROCEDURE — 86140 C-REACTIVE PROTEIN: CPT

## 2018-03-28 PROCEDURE — 85652 RBC SED RATE AUTOMATED: CPT

## 2018-03-28 PROCEDURE — 36415 COLL VENOUS BLD VENIPUNCTURE: CPT | Performed by: INTERNAL MEDICINE

## 2018-03-28 PROCEDURE — 80053 COMPREHEN METABOLIC PANEL: CPT | Performed by: INTERNAL MEDICINE

## 2018-03-28 RX ADMIN — IOPAMIDOL 100 ML: 612 INJECTION, SOLUTION INTRAVENOUS at 14:15

## 2018-03-28 RX ADMIN — DIATRIZOATE MEGLUMINE AND DIATRIZOATE SODIUM 15 ML: 660; 100 LIQUID ORAL; RECTAL at 13:00

## 2018-03-30 ENCOUNTER — TRANSCRIBE ORDERS (OUTPATIENT)
Dept: LAB | Facility: HOSPITAL | Age: 58
End: 2018-03-30

## 2018-03-30 ENCOUNTER — TELEPHONE (OUTPATIENT)
Dept: INTERNAL MEDICINE | Facility: CLINIC | Age: 58
End: 2018-03-30

## 2018-03-30 ENCOUNTER — LAB (OUTPATIENT)
Dept: LAB | Facility: HOSPITAL | Age: 58
End: 2018-03-30

## 2018-03-30 DIAGNOSIS — E03.9 ACQUIRED HYPOTHYROIDISM: ICD-10-CM

## 2018-03-30 DIAGNOSIS — R19.7 DIARRHEA, UNSPECIFIED TYPE: ICD-10-CM

## 2018-03-30 DIAGNOSIS — R19.7 DIARRHEA, UNSPECIFIED TYPE: Primary | ICD-10-CM

## 2018-03-30 DIAGNOSIS — K57.32 DIVERTICULITIS OF LARGE INTESTINE WITHOUT PERFORATION OR ABSCESS WITHOUT BLEEDING: ICD-10-CM

## 2018-03-30 LAB — C DIFF TOX GENS STL QL NAA+PROBE: NOT DETECTED

## 2018-03-30 PROCEDURE — 87493 C DIFF AMPLIFIED PROBE: CPT

## 2018-03-30 RX ORDER — LEVOTHYROXINE SODIUM 0.07 MG/1
75 TABLET ORAL DAILY
Qty: 90 TABLET | Refills: 1 | Status: SHIPPED | OUTPATIENT
Start: 2018-03-30 | End: 2018-04-02 | Stop reason: SDUPTHER

## 2018-03-30 NOTE — TELEPHONE ENCOUNTER
----- Message from Anna Craft sent at 3/30/2018  2:06 PM EDT -----  Dr. Melton patient that is out of refills on her Levothyroxine and needs refill sent to [facility].  Patient can be reached at [phone] if needed.

## 2018-04-02 ENCOUNTER — APPOINTMENT (OUTPATIENT)
Dept: LAB | Facility: HOSPITAL | Age: 58
End: 2018-04-02

## 2018-04-02 ENCOUNTER — TRANSCRIBE ORDERS (OUTPATIENT)
Dept: LAB | Facility: HOSPITAL | Age: 58
End: 2018-04-02

## 2018-04-02 DIAGNOSIS — E03.9 ACQUIRED HYPOTHYROIDISM: ICD-10-CM

## 2018-04-02 DIAGNOSIS — K57.32 DIVERTICULITIS OF LARGE INTESTINE, UNSPECIFIED BLEEDING STATUS, UNSPECIFIED COMPLICATION STATUS: Primary | ICD-10-CM

## 2018-04-02 LAB
ALBUMIN SERPL-MCNC: 4.1 G/DL (ref 3.2–4.8)
ALBUMIN/GLOB SERPL: 1.5 G/DL (ref 1.5–2.5)
ALP SERPL-CCNC: 56 U/L (ref 25–100)
ALT SERPL W P-5'-P-CCNC: 31 U/L (ref 7–40)
ANION GAP SERPL CALCULATED.3IONS-SCNC: 9 MMOL/L (ref 3–11)
AST SERPL-CCNC: 23 U/L (ref 0–33)
BASOPHILS # BLD AUTO: 0.03 10*3/MM3 (ref 0–0.2)
BASOPHILS NFR BLD AUTO: 0.4 % (ref 0–1)
BILIRUB SERPL-MCNC: 0.3 MG/DL (ref 0.3–1.2)
BUN BLD-MCNC: 14 MG/DL (ref 9–23)
BUN/CREAT SERPL: 20 (ref 7–25)
CALCIUM SPEC-SCNC: 9.3 MG/DL (ref 8.7–10.4)
CHLORIDE SERPL-SCNC: 102 MMOL/L (ref 99–109)
CO2 SERPL-SCNC: 29 MMOL/L (ref 20–31)
CREAT BLD-MCNC: 0.7 MG/DL (ref 0.6–1.3)
CRP SERPL-MCNC: 1.54 MG/DL (ref 0–1)
DEPRECATED RDW RBC AUTO: 43.7 FL (ref 37–54)
EOSINOPHIL # BLD AUTO: 0.34 10*3/MM3 (ref 0–0.3)
EOSINOPHIL NFR BLD AUTO: 4 % (ref 0–3)
ERYTHROCYTE [DISTWIDTH] IN BLOOD BY AUTOMATED COUNT: 12.2 % (ref 11.3–14.5)
ERYTHROCYTE [SEDIMENTATION RATE] IN BLOOD: 19 MM/HR (ref 0–30)
GFR SERPL CREATININE-BSD FRML MDRD: 87 ML/MIN/1.73
GLOBULIN UR ELPH-MCNC: 2.8 GM/DL
GLUCOSE BLD-MCNC: 91 MG/DL (ref 70–100)
HCT VFR BLD AUTO: 38 % (ref 34.5–44)
HGB BLD-MCNC: 12.5 G/DL (ref 11.5–15.5)
IMM GRANULOCYTES # BLD: 0.04 10*3/MM3 (ref 0–0.03)
IMM GRANULOCYTES NFR BLD: 0.5 % (ref 0–0.6)
LYMPHOCYTES # BLD AUTO: 2.02 10*3/MM3 (ref 0.6–4.8)
LYMPHOCYTES NFR BLD AUTO: 23.7 % (ref 24–44)
MCH RBC QN AUTO: 32.6 PG (ref 27–31)
MCHC RBC AUTO-ENTMCNC: 32.9 G/DL (ref 32–36)
MCV RBC AUTO: 99.2 FL (ref 80–99)
MONOCYTES # BLD AUTO: 0.75 10*3/MM3 (ref 0–1)
MONOCYTES NFR BLD AUTO: 8.8 % (ref 0–12)
NEUTROPHILS # BLD AUTO: 5.33 10*3/MM3 (ref 1.5–8.3)
NEUTROPHILS NFR BLD AUTO: 62.6 % (ref 41–71)
PLATELET # BLD AUTO: 292 10*3/MM3 (ref 150–450)
PMV BLD AUTO: 10.1 FL (ref 6–12)
POTASSIUM BLD-SCNC: 4.3 MMOL/L (ref 3.5–5.5)
PROT SERPL-MCNC: 6.9 G/DL (ref 5.7–8.2)
RBC # BLD AUTO: 3.83 10*6/MM3 (ref 3.89–5.14)
SODIUM BLD-SCNC: 140 MMOL/L (ref 132–146)
WBC NRBC COR # BLD: 8.51 10*3/MM3 (ref 3.5–10.8)

## 2018-04-02 PROCEDURE — 36415 COLL VENOUS BLD VENIPUNCTURE: CPT | Performed by: INTERNAL MEDICINE

## 2018-04-02 PROCEDURE — 85652 RBC SED RATE AUTOMATED: CPT | Performed by: INTERNAL MEDICINE

## 2018-04-02 PROCEDURE — 85025 COMPLETE CBC W/AUTO DIFF WBC: CPT | Performed by: INTERNAL MEDICINE

## 2018-04-02 PROCEDURE — 80053 COMPREHEN METABOLIC PANEL: CPT | Performed by: INTERNAL MEDICINE

## 2018-04-02 PROCEDURE — 86140 C-REACTIVE PROTEIN: CPT | Performed by: INTERNAL MEDICINE

## 2018-04-02 RX ORDER — LEVOTHYROXINE SODIUM 0.07 MG/1
75 TABLET ORAL DAILY
Qty: 90 TABLET | Refills: 1 | Status: SHIPPED | OUTPATIENT
Start: 2018-04-02 | End: 2018-10-04 | Stop reason: SDUPTHER

## 2018-04-09 ENCOUNTER — LAB (OUTPATIENT)
Dept: LAB | Facility: HOSPITAL | Age: 58
End: 2018-04-09

## 2018-04-09 ENCOUNTER — TRANSCRIBE ORDERS (OUTPATIENT)
Dept: LAB | Facility: HOSPITAL | Age: 58
End: 2018-04-09

## 2018-04-09 DIAGNOSIS — K57.90 DIVERTICULOSIS OF INTESTINE WITHOUT BLEEDING, UNSPECIFIED INTESTINAL TRACT LOCATION: ICD-10-CM

## 2018-04-09 DIAGNOSIS — R94.5 NONSPECIFIC ABNORMAL RESULTS OF LIVER FUNCTION STUDY: ICD-10-CM

## 2018-04-09 DIAGNOSIS — K57.32 DIVERTICULITIS OF LARGE INTESTINE, UNSPECIFIED BLEEDING STATUS, UNSPECIFIED COMPLICATION STATUS: ICD-10-CM

## 2018-04-09 DIAGNOSIS — K57.32 DIVERTICULITIS OF LARGE INTESTINE, UNSPECIFIED BLEEDING STATUS, UNSPECIFIED COMPLICATION STATUS: Primary | ICD-10-CM

## 2018-04-09 LAB
ALBUMIN SERPL-MCNC: 4.5 G/DL (ref 3.2–4.8)
ALBUMIN/GLOB SERPL: 1.5 G/DL (ref 1.5–2.5)
ALP SERPL-CCNC: 63 U/L (ref 25–100)
ALT SERPL W P-5'-P-CCNC: 52 U/L (ref 7–40)
ANION GAP SERPL CALCULATED.3IONS-SCNC: 13 MMOL/L (ref 3–11)
AST SERPL-CCNC: 36 U/L (ref 0–33)
BASOPHILS # BLD AUTO: 0.02 10*3/MM3 (ref 0–0.2)
BASOPHILS NFR BLD AUTO: 0.2 % (ref 0–1)
BILIRUB SERPL-MCNC: 0.8 MG/DL (ref 0.3–1.2)
BUN BLD-MCNC: 12 MG/DL (ref 9–23)
BUN/CREAT SERPL: 17.1 (ref 7–25)
CALCIUM SPEC-SCNC: 9.3 MG/DL (ref 8.7–10.4)
CHLORIDE SERPL-SCNC: 99 MMOL/L (ref 99–109)
CO2 SERPL-SCNC: 24 MMOL/L (ref 20–31)
CREAT BLD-MCNC: 0.7 MG/DL (ref 0.6–1.3)
CRP SERPL-MCNC: 0.7 MG/DL (ref 0–1)
DEPRECATED RDW RBC AUTO: 44.3 FL (ref 37–54)
EOSINOPHIL # BLD AUTO: 0.31 10*3/MM3 (ref 0–0.3)
EOSINOPHIL NFR BLD AUTO: 3.5 % (ref 0–3)
ERYTHROCYTE [DISTWIDTH] IN BLOOD BY AUTOMATED COUNT: 12.4 % (ref 11.3–14.5)
ERYTHROCYTE [SEDIMENTATION RATE] IN BLOOD: 17 MM/HR (ref 0–30)
GFR SERPL CREATININE-BSD FRML MDRD: 87 ML/MIN/1.73
GLOBULIN UR ELPH-MCNC: 3 GM/DL
GLUCOSE BLD-MCNC: 94 MG/DL (ref 70–100)
HCT VFR BLD AUTO: 41 % (ref 34.5–44)
HGB BLD-MCNC: 13.9 G/DL (ref 11.5–15.5)
IMM GRANULOCYTES # BLD: 0.02 10*3/MM3 (ref 0–0.03)
IMM GRANULOCYTES NFR BLD: 0.2 % (ref 0–0.6)
LYMPHOCYTES # BLD AUTO: 2.05 10*3/MM3 (ref 0.6–4.8)
LYMPHOCYTES NFR BLD AUTO: 22.9 % (ref 24–44)
MCH RBC QN AUTO: 33.2 PG (ref 27–31)
MCHC RBC AUTO-ENTMCNC: 33.9 G/DL (ref 32–36)
MCV RBC AUTO: 97.9 FL (ref 80–99)
MONOCYTES # BLD AUTO: 0.6 10*3/MM3 (ref 0–1)
MONOCYTES NFR BLD AUTO: 6.7 % (ref 0–12)
NEUTROPHILS # BLD AUTO: 5.94 10*3/MM3 (ref 1.5–8.3)
NEUTROPHILS NFR BLD AUTO: 66.5 % (ref 41–71)
PLATELET # BLD AUTO: 344 10*3/MM3 (ref 150–450)
PMV BLD AUTO: 10.3 FL (ref 6–12)
POTASSIUM BLD-SCNC: 4.2 MMOL/L (ref 3.5–5.5)
PROT SERPL-MCNC: 7.5 G/DL (ref 5.7–8.2)
RBC # BLD AUTO: 4.19 10*6/MM3 (ref 3.89–5.14)
SODIUM BLD-SCNC: 136 MMOL/L (ref 132–146)
WBC NRBC COR # BLD: 8.94 10*3/MM3 (ref 3.5–10.8)

## 2018-04-09 PROCEDURE — 86140 C-REACTIVE PROTEIN: CPT

## 2018-04-09 PROCEDURE — 85025 COMPLETE CBC W/AUTO DIFF WBC: CPT

## 2018-04-09 PROCEDURE — 85652 RBC SED RATE AUTOMATED: CPT

## 2018-04-09 PROCEDURE — 36415 COLL VENOUS BLD VENIPUNCTURE: CPT

## 2018-04-09 PROCEDURE — 80053 COMPREHEN METABOLIC PANEL: CPT

## 2018-04-16 ENCOUNTER — TRANSCRIBE ORDERS (OUTPATIENT)
Dept: LAB | Facility: HOSPITAL | Age: 58
End: 2018-04-16

## 2018-04-16 ENCOUNTER — LAB (OUTPATIENT)
Dept: LAB | Facility: HOSPITAL | Age: 58
End: 2018-04-16

## 2018-04-16 DIAGNOSIS — K57.20 DIVERTICULITIS OF LARGE INTESTINE WITH PERFORATION WITHOUT ABSCESS, UNSPECIFIED BLEEDING STATUS: ICD-10-CM

## 2018-04-16 DIAGNOSIS — K57.20 DIVERTICULITIS OF LARGE INTESTINE WITH PERFORATION WITHOUT ABSCESS, UNSPECIFIED BLEEDING STATUS: Primary | ICD-10-CM

## 2018-04-16 LAB
ALBUMIN SERPL-MCNC: 4 G/DL (ref 3.2–4.8)
ALBUMIN/GLOB SERPL: 1.5 G/DL (ref 1.5–2.5)
ALP SERPL-CCNC: 46 U/L (ref 25–100)
ALT SERPL W P-5'-P-CCNC: 25 U/L (ref 7–40)
ANION GAP SERPL CALCULATED.3IONS-SCNC: 8 MMOL/L (ref 3–11)
AST SERPL-CCNC: 18 U/L (ref 0–33)
BASOPHILS # BLD AUTO: 0.02 10*3/MM3 (ref 0–0.2)
BASOPHILS NFR BLD AUTO: 0.3 % (ref 0–1)
BILIRUB SERPL-MCNC: 0.6 MG/DL (ref 0.3–1.2)
BILIRUB UR QL STRIP: NEGATIVE
BUN BLD-MCNC: 7 MG/DL (ref 9–23)
BUN/CREAT SERPL: 10 (ref 7–25)
CALCIUM SPEC-SCNC: 9.1 MG/DL (ref 8.7–10.4)
CHLORIDE SERPL-SCNC: 98 MMOL/L (ref 99–109)
CLARITY UR: CLEAR
CO2 SERPL-SCNC: 29 MMOL/L (ref 20–31)
COLOR UR: YELLOW
CREAT BLD-MCNC: 0.7 MG/DL (ref 0.6–1.3)
DEPRECATED RDW RBC AUTO: 43.7 FL (ref 37–54)
EOSINOPHIL # BLD AUTO: 0.41 10*3/MM3 (ref 0–0.3)
EOSINOPHIL NFR BLD AUTO: 6.5 % (ref 0–3)
ERYTHROCYTE [DISTWIDTH] IN BLOOD BY AUTOMATED COUNT: 12.2 % (ref 11.3–14.5)
GFR SERPL CREATININE-BSD FRML MDRD: 87 ML/MIN/1.73
GLOBULIN UR ELPH-MCNC: 2.7 GM/DL
GLUCOSE BLD-MCNC: 89 MG/DL (ref 70–100)
GLUCOSE UR STRIP-MCNC: NEGATIVE MG/DL
HCT VFR BLD AUTO: 35.4 % (ref 34.5–44)
HGB BLD-MCNC: 11.9 G/DL (ref 11.5–15.5)
HGB UR QL STRIP.AUTO: NEGATIVE
IMM GRANULOCYTES # BLD: 0.01 10*3/MM3 (ref 0–0.03)
IMM GRANULOCYTES NFR BLD: 0.2 % (ref 0–0.6)
KETONES UR QL STRIP: NEGATIVE
LEUKOCYTE ESTERASE UR QL STRIP.AUTO: NEGATIVE
LYMPHOCYTES # BLD AUTO: 1.42 10*3/MM3 (ref 0.6–4.8)
LYMPHOCYTES NFR BLD AUTO: 22.7 % (ref 24–44)
MCH RBC QN AUTO: 32.7 PG (ref 27–31)
MCHC RBC AUTO-ENTMCNC: 33.6 G/DL (ref 32–36)
MCV RBC AUTO: 97.3 FL (ref 80–99)
MONOCYTES # BLD AUTO: 0.56 10*3/MM3 (ref 0–1)
MONOCYTES NFR BLD AUTO: 8.9 % (ref 0–12)
NEUTROPHILS # BLD AUTO: 3.85 10*3/MM3 (ref 1.5–8.3)
NEUTROPHILS NFR BLD AUTO: 61.6 % (ref 41–71)
NITRITE UR QL STRIP: NEGATIVE
PH UR STRIP.AUTO: 7 [PH] (ref 5–8)
PLATELET # BLD AUTO: 280 10*3/MM3 (ref 150–450)
PMV BLD AUTO: 10.6 FL (ref 6–12)
POTASSIUM BLD-SCNC: 3.8 MMOL/L (ref 3.5–5.5)
PROT SERPL-MCNC: 6.7 G/DL (ref 5.7–8.2)
PROT UR QL STRIP: NEGATIVE
RBC # BLD AUTO: 3.64 10*6/MM3 (ref 3.89–5.14)
SODIUM BLD-SCNC: 135 MMOL/L (ref 132–146)
SP GR UR STRIP: 1.01 (ref 1–1.03)
UROBILINOGEN UR QL STRIP: NORMAL
WBC NRBC COR # BLD: 6.26 10*3/MM3 (ref 3.5–10.8)

## 2018-04-16 PROCEDURE — 87086 URINE CULTURE/COLONY COUNT: CPT

## 2018-04-16 PROCEDURE — 85025 COMPLETE CBC W/AUTO DIFF WBC: CPT

## 2018-04-16 PROCEDURE — 80053 COMPREHEN METABOLIC PANEL: CPT

## 2018-04-16 PROCEDURE — 81003 URINALYSIS AUTO W/O SCOPE: CPT

## 2018-04-16 PROCEDURE — 36415 COLL VENOUS BLD VENIPUNCTURE: CPT

## 2018-04-18 LAB — BACTERIA SPEC AEROBE CULT: NORMAL

## 2018-05-16 ENCOUNTER — TELEPHONE (OUTPATIENT)
Dept: INTERNAL MEDICINE | Facility: CLINIC | Age: 58
End: 2018-05-16

## 2018-06-11 ENCOUNTER — OFFICE VISIT (OUTPATIENT)
Dept: INTERNAL MEDICINE | Facility: CLINIC | Age: 58
End: 2018-06-11

## 2018-06-11 VITALS
HEART RATE: 84 BPM | WEIGHT: 159.38 LBS | SYSTOLIC BLOOD PRESSURE: 142 MMHG | TEMPERATURE: 97.6 F | DIASTOLIC BLOOD PRESSURE: 92 MMHG | RESPIRATION RATE: 20 BRPM | BODY MASS INDEX: 28.23 KG/M2

## 2018-06-11 DIAGNOSIS — E03.9 ACQUIRED HYPOTHYROIDISM: ICD-10-CM

## 2018-06-11 DIAGNOSIS — M25.511 ACUTE PAIN OF RIGHT SHOULDER: ICD-10-CM

## 2018-06-11 DIAGNOSIS — E78.49 OTHER HYPERLIPIDEMIA: Primary | ICD-10-CM

## 2018-06-11 DIAGNOSIS — K21.9 GASTROESOPHAGEAL REFLUX DISEASE WITHOUT ESOPHAGITIS: ICD-10-CM

## 2018-06-11 DIAGNOSIS — F41.1 GENERALIZED ANXIETY DISORDER: ICD-10-CM

## 2018-06-11 DIAGNOSIS — I10 ESSENTIAL HYPERTENSION: ICD-10-CM

## 2018-06-11 LAB
ARTICHOKE IGE QN: 194 MG/DL (ref 0–130)
CHOLEST SERPL-MCNC: 267 MG/DL (ref 0–200)
HDLC SERPL-MCNC: 73 MG/DL (ref 40–60)
T4 FREE SERPL-MCNC: 1.36 NG/DL (ref 0.89–1.76)
TRIGL SERPL-MCNC: 276 MG/DL (ref 0–150)
TSH SERPL DL<=0.05 MIU/L-ACNC: 3.33 MIU/ML (ref 0.35–5.35)

## 2018-06-11 PROCEDURE — 80061 LIPID PANEL: CPT | Performed by: INTERNAL MEDICINE

## 2018-06-11 PROCEDURE — 84439 ASSAY OF FREE THYROXINE: CPT | Performed by: INTERNAL MEDICINE

## 2018-06-11 PROCEDURE — 36415 COLL VENOUS BLD VENIPUNCTURE: CPT | Performed by: INTERNAL MEDICINE

## 2018-06-11 PROCEDURE — 84443 ASSAY THYROID STIM HORMONE: CPT | Performed by: INTERNAL MEDICINE

## 2018-06-11 PROCEDURE — 99215 OFFICE O/P EST HI 40 MIN: CPT | Performed by: INTERNAL MEDICINE

## 2018-06-11 RX ORDER — ATORVASTATIN CALCIUM 10 MG/1
1 TABLET, FILM COATED ORAL DAILY
COMMUNITY
Start: 2018-03-29 | End: 2018-10-31 | Stop reason: SDUPTHER

## 2018-06-11 NOTE — PROGRESS NOTES
Subjective       Roz Randolph is a 57 y.o. female.     Chief Complaint   Patient presents with   • Hyperlipidemia     3 month follow up  fasting        History obtained from the patient.      The patient was hospitalized from 3/3/18 through 3/6/18 for acute diverticulitis.  Records have been received and reviewed.  On 4/12/18,  she had a Colonoscopy which showed significant sigmoid diverticulitis, but no polyps.  On 4/13/18, she underwent a subtotal colectomy, with removal of part of her sigmoid colon.  She has done well since.    [facility]: The patient is here today for a follow-up visit.  The patient had a normal CMP and CBC on 4/16/18.      Her Hypertension is unstable.   Medication(s): Losartan HCTZ.   Her Hyperlipidemia has been unstable.   Her LDL goal is 130 mg/dL, last LDL was 188 mg/dL, .   Medication(s): Atorvastatin  The patient is mostly adherent with her medication regimen. She denies medication side effects.       Interval Events: She is having some left eye blurred vision.  She was told she may have scratched her eye during surgery.  She is on eyedrops, but has not seen an ophthalmologist.  It is improving slowly.  The patient occasionally checks her blood pressure at home, and states it has been 120s/80s.  Atorvastatin was started after the visit 1/3/18.     Symptoms: Denies chest pain,  intermittent leg claudication,  dyspnea, lower extremity edema,  exercise intolerance, visual impairment, muscle pain, and  muscle weakness.   Associated symptoms:  Has fatigue.  No significant weight changes,  but no palpitations, no syncope, no headache, no orthopnea, no PND, no polydipsia, no polyuria, no focal neurologic deficits, and no memory loss.      Lifestyle and Disease Management: Diet: She consumes a diverse and healthy diet.  Weight Issues: She has weight concerns. Exercise: She exercises daily Exercise includes walking.    Smoking: She does not use tobacco.      Hypothyroidism Follow-Up: The patient is being seen for follow-up of Hypothyroidism, which is stable.   Interval events: None.  Symptoms:   No significant weight changes.  Has fatigue and hair loss, but denies cold intolerance, weakness, constipation,  dyspnea on exertion,  trouble concentrating, and dry skin.   Associated symptoms: no myalgias, no arthralgias, and no paresthesias.   Medications:  Levothyroxine (Synthroid).   The patient is adherent to her medication regimen,  and she denies medication side effects.          Gastroesophageal Reflux Disease Follow-up: The patient is being seen for a routine clinic follow-up of Gastroesophageal Reflux Disease, which is stable.   Symptoms:  No abdominal pain, no heartburn, no acid regurgitation, no nausea, no vomiting, no sore throat, no dysphagia, no odynophagia, no hematemesis, and no melena    Previous Evaluation: EGD 12/21/15 (small hiatal hernia, mild duodenitis, and concentric espohageal rings suggestive of esophsgitis- path c/w mild chronic esophagitis w/ increased eosinophils suggestive of reflux ([place]).   Associated symptoms:  no anorexia, no early satiety, no bloating, no belching, no weight loss, no hoarseness, no cough and no wheezing.   Medication: Tums prn.         Anxiety Disorder Follow-Up: The patient is being seen for follow-up of Anxiety, which is stable  She has no comorbid illnesses.   Interval Events:  None.   Symptoms:  stable anxiety, but denies difficulty concentrating,  restlessness, panic attacks,  sleep disruption,  and  depression.   Associated symptoms: no suicidal ideation.   Medication(s): Wellbutrin XL.   The patient is adherent to her medication regimen, and she denies medication side effects.         Shoulder Injury    Incident location: At the hospital, during her hospitalization 3/3/18 through 3/6/18. The right shoulder is affected. Injury mechanism: She has had pain since a blood pressure cuff was put over her IV and  inflated.. The quality of the pain is described as shooting and aching. The pain does not radiate. The pain is moderate. Associated symptoms include muscle weakness. Pertinent negatives include no chest pain, numbness or tingling. The symptoms are aggravated by movement. She has tried nothing for the symptoms.            Current Outpatient Prescriptions on File Prior to Visit   Medication Sig Dispense Refill   • buPROPion XL (WELLBUTRIN XL) 150 MG 24 hr tablet Take 1 tablet by mouth Every Morning. 90 tablet 3   • levothyroxine (SYNTHROID, LEVOTHROID) 75 MCG tablet Take 1 tablet by mouth Daily. 90 tablet 1   • losartan-hydrochlorothiazide (HYZAAR) 100-12.5 MG per tablet Take 1 tablet by mouth Daily. 90 tablet 3     No current facility-administered medications on file prior to visit.        Current outpatient and discharge medications have been reconciled for the patient.  Reviewed by: Maia Melton MD        The following portions of the patient's history were reviewed and updated as appropriate: allergies, current medications, past family history, past medical history, past social history, past surgical history and problem list.    Review of Systems   Constitutional: Positive for fatigue. Negative for unexpected weight change.   HENT: Negative for sore throat and voice change.    Eyes: Negative for visual disturbance.   Respiratory: Negative for cough, shortness of breath and wheezing.    Cardiovascular: Negative for chest pain, palpitations and leg swelling.        No PORTILLO, orthopnea, or claudication.   Gastrointestinal: Negative for abdominal pain, blood in stool, constipation, diarrhea, nausea and vomiting.        Denies melena.   Endocrine: Negative for cold intolerance, heat intolerance, polydipsia and polyuria.   Musculoskeletal: Negative for arthralgias and myalgias.   Skin: Negative for rash.   Neurological: Negative for dizziness, tingling, syncope, light-headedness, numbness and headaches.        No memory  issues.   Psychiatric/Behavioral: Negative for decreased concentration, sleep disturbance and suicidal ideas. The patient is nervous/anxious.          Objective       Blood pressure 142/92, pulse 84, temperature 97.6 °F (36.4 °C), temperature source Temporal Artery , resp. rate 20, weight 72.3 kg (159 lb 6 oz).      Physical Exam   Constitutional:   Overweight.   Neck: Normal range of motion. Neck supple. Carotid bruit is not present. No thyromegaly present.   There is no tenderness to palpation over the cervical spine or paraspinal muscles.   Cardiovascular: Normal rate, regular rhythm, normal heart sounds and intact distal pulses.  Exam reveals no gallop and no friction rub.    No murmur heard.  No peripheral edema.   Pulmonary/Chest: Effort normal and breath sounds normal.   Abdominal: Soft. Bowel sounds are normal. She exhibits no distension, no abdominal bruit and no mass. There is no hepatosplenomegaly. There is no tenderness.   Musculoskeletal: Normal range of motion.   There is no tenderness to palpation of the affected shoulder.  There is no erythema, edema, or warmth.  There is no pain with abduction or adduction, but there is pain with external rotation and internal rotation.  The patient is able to put both hands behind the head, both hands behind the back, and do the crossover maneuver, but has pain, with these maneuvers.  Good  strength bilaterally.   Neurological: She has normal strength and normal reflexes.   Skin: No rash noted.   Psychiatric: She has a normal mood and affect.   Nursing note and vitals reviewed.      Assessment / Plan:  Roz was seen today for hyperlipidemia.    Diagnoses and all orders for this visit:    Other hyperlipidemia  -     Lipid Panel    Essential hypertension    Acquired hypothyroidism  -     TSH  -     T4, Free    Gastroesophageal reflux disease without esophagitis    Generalized anxiety disorder    Acute pain of right shoulder  -     Ambulatory Referral to Physical  Therapy Evaluate and treat        The patient agrees to schedule her DEXA Scan.      Recommended Shingrix (bew Shingles vaccine) at the pharmacy.    Return in 6 months (on 12/11/2018) for Annual physical, fasting.

## 2018-06-11 NOTE — PATIENT INSTRUCTIONS
The patient agrees to schedule her DEXA Scan.      Recommended Shingrix (bew Shingles vaccine) at the pharmacy.

## 2018-10-04 ENCOUNTER — TELEPHONE (OUTPATIENT)
Dept: INTERNAL MEDICINE | Facility: CLINIC | Age: 58
End: 2018-10-04

## 2018-10-04 DIAGNOSIS — E03.9 ACQUIRED HYPOTHYROIDISM: ICD-10-CM

## 2018-10-04 RX ORDER — LEVOTHYROXINE SODIUM 0.07 MG/1
75 TABLET ORAL DAILY
Qty: 90 TABLET | Refills: 1 | Status: SHIPPED | OUTPATIENT
Start: 2018-10-04 | End: 2019-07-03 | Stop reason: SDUPTHER

## 2018-10-04 NOTE — TELEPHONE ENCOUNTER
----- Message from Elin Hodge sent at 10/4/2018  9:28 AM EDT -----  [phone]    NEEDS REFILL OF THYROID MEDS-SHE IS COMPLETELY OUT    [facility]

## 2018-10-30 ENCOUNTER — TELEPHONE (OUTPATIENT)
Dept: INTERNAL MEDICINE | Facility: CLINIC | Age: 58
End: 2018-10-30

## 2018-10-30 DIAGNOSIS — Z78.0 MENOPAUSE: Primary | ICD-10-CM

## 2018-10-30 NOTE — TELEPHONE ENCOUNTER
----- Message from Yoselin Griffith sent at 10/30/2018 12:06 PM EDT -----  PATIENT IS REQUESTING A NEW ORDER FOR DEXA SCAN.     PATIENT CONTACT: [phone] [place] TO [facility].    THANK YOU.

## 2018-10-31 RX ORDER — ATORVASTATIN CALCIUM 10 MG/1
10 TABLET, FILM COATED ORAL DAILY
Qty: 30 TABLET | Refills: 5 | Status: SHIPPED | OUTPATIENT
Start: 2018-10-31 | End: 2019-04-15 | Stop reason: SDUPTHER

## 2018-11-27 ENCOUNTER — HOSPITAL ENCOUNTER (OUTPATIENT)
Dept: BONE DENSITY | Facility: HOSPITAL | Age: 58
Discharge: HOME | End: 2018-11-27
Attending: INTERNAL MEDICINE | Admitting: INTERNAL MEDICINE

## 2018-11-27 DIAGNOSIS — Z78.0 MENOPAUSE: ICD-10-CM

## 2018-11-27 PROCEDURE — 77080 DXA BONE DENSITY AXIAL: CPT

## 2019-02-08 ENCOUNTER — CLINICAL SUPPORT (OUTPATIENT)
Dept: INTERNAL MEDICINE | Facility: CLINIC | Age: 59
End: 2019-02-08

## 2019-02-08 DIAGNOSIS — Z00.00 ROUTINE ADULT HEALTH MAINTENANCE: Primary | ICD-10-CM

## 2019-02-08 DIAGNOSIS — Z00.00 ROUTINE ADULT HEALTH MAINTENANCE: ICD-10-CM

## 2019-02-08 PROCEDURE — 86580 TB INTRADERMAL TEST: CPT | Performed by: INTERNAL MEDICINE

## 2019-02-11 LAB
INDURATION: 0 MM (ref 0–10)
Lab: NORMAL
Lab: NORMAL
TB SKIN TEST: NEGATIVE

## 2019-03-21 RX ORDER — LOSARTAN POTASSIUM AND HYDROCHLOROTHIAZIDE 12.5; 1 MG/1; MG/1
1 TABLET ORAL DAILY
Qty: 90 TABLET | Refills: 3 | Status: SHIPPED | OUTPATIENT
Start: 2019-03-21 | End: 2020-06-16 | Stop reason: DRUGHIGH

## 2019-03-22 ENCOUNTER — TELEPHONE (OUTPATIENT)
Dept: INTERNAL MEDICINE | Facility: CLINIC | Age: 59
End: 2019-03-22

## 2019-03-22 DIAGNOSIS — F41.9 ANXIETY DISORDER, UNSPECIFIED TYPE: Primary | ICD-10-CM

## 2019-03-22 DIAGNOSIS — F41.1 GENERALIZED ANXIETY DISORDER: ICD-10-CM

## 2019-03-22 RX ORDER — BUPROPION HYDROCHLORIDE 150 MG/1
150 TABLET ORAL EVERY MORNING
Qty: 90 TABLET | Refills: 0 | Status: SHIPPED | OUTPATIENT
Start: 2019-03-22 | End: 2019-07-01 | Stop reason: SDUPTHER

## 2019-03-22 NOTE — TELEPHONE ENCOUNTER
----- Message from Anna Craft sent at 3/22/2019  9:19 AM EDT -----  Patient leaving for [place] today at noon and needs rx for buPROPion XL (WELLBUTRIN XL) 150 MG 24 hr tablet sent to [facility] ASAP as they are flying out for [place] at noon.

## 2019-03-22 NOTE — TELEPHONE ENCOUNTER
E-rx'd # [id number].  She is past due for fasting follow up appointment.  Will need to be seen before any other med refills. Please schedule.

## 2019-04-05 ENCOUNTER — TELEPHONE (OUTPATIENT)
Dept: INTERNAL MEDICINE | Facility: CLINIC | Age: 59
End: 2019-04-05

## 2019-04-05 NOTE — TELEPHONE ENCOUNTER
----- Message from Kylah Vega sent at 4/5/2019  9:21 AM EDT -----  PATIENT STATES SHE ATE AT A [employer] THAT A WORKER TESTED POSITIVE FOR HEP A. 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 IS THERE ANYTHING SHE NEEDS TO DO AND SHOULD SHE GET THE VACCINE? SHE CAN BE REACHED [phone].

## 2019-04-05 NOTE — TELEPHONE ENCOUNTER
Spoke with patient and advised. She verbalized good understanding, thanked our office and we ended the call.

## 2019-04-05 NOTE — TELEPHONE ENCOUNTER
"She needs Hep A vaccine. Can get here during walk-ins or at pharmacy. Would do this as soon as possible. That is the only form of \"protection.\" Watch for any F/C, N/V/D etc.      "

## 2019-04-15 ENCOUNTER — OFFICE VISIT (OUTPATIENT)
Dept: INTERNAL MEDICINE | Facility: CLINIC | Age: 59
End: 2019-04-15

## 2019-04-15 VITALS
WEIGHT: 168 LBS | BODY MASS INDEX: 28.68 KG/M2 | DIASTOLIC BLOOD PRESSURE: 90 MMHG | HEART RATE: 97 BPM | SYSTOLIC BLOOD PRESSURE: 162 MMHG | OXYGEN SATURATION: 98 % | HEIGHT: 64 IN | RESPIRATION RATE: 16 BRPM

## 2019-04-15 DIAGNOSIS — Z23 NEED FOR HEPATITIS A IMMUNIZATION: ICD-10-CM

## 2019-04-15 DIAGNOSIS — Z12.31 ENCOUNTER FOR SCREENING MAMMOGRAM FOR BREAST CANCER: ICD-10-CM

## 2019-04-15 DIAGNOSIS — E03.9 ACQUIRED HYPOTHYROIDISM: ICD-10-CM

## 2019-04-15 DIAGNOSIS — I10 ESSENTIAL HYPERTENSION: ICD-10-CM

## 2019-04-15 DIAGNOSIS — F41.1 GENERALIZED ANXIETY DISORDER: ICD-10-CM

## 2019-04-15 DIAGNOSIS — K21.9 GASTROESOPHAGEAL REFLUX DISEASE WITHOUT ESOPHAGITIS: ICD-10-CM

## 2019-04-15 DIAGNOSIS — E78.49 OTHER HYPERLIPIDEMIA: Primary | ICD-10-CM

## 2019-04-15 PROCEDURE — 80053 COMPREHEN METABOLIC PANEL: CPT | Performed by: INTERNAL MEDICINE

## 2019-04-15 PROCEDURE — 90471 IMMUNIZATION ADMIN: CPT | Performed by: INTERNAL MEDICINE

## 2019-04-15 PROCEDURE — 84443 ASSAY THYROID STIM HORMONE: CPT | Performed by: INTERNAL MEDICINE

## 2019-04-15 PROCEDURE — 84439 ASSAY OF FREE THYROXINE: CPT | Performed by: INTERNAL MEDICINE

## 2019-04-15 PROCEDURE — 99214 OFFICE O/P EST MOD 30 MIN: CPT | Performed by: INTERNAL MEDICINE

## 2019-04-15 PROCEDURE — 90632 HEPA VACCINE ADULT IM: CPT | Performed by: INTERNAL MEDICINE

## 2019-04-15 PROCEDURE — 80061 LIPID PANEL: CPT | Performed by: INTERNAL MEDICINE

## 2019-04-15 PROCEDURE — 36415 COLL VENOUS BLD VENIPUNCTURE: CPT | Performed by: INTERNAL MEDICINE

## 2019-04-15 RX ORDER — ATORVASTATIN CALCIUM 10 MG/1
10 TABLET, FILM COATED ORAL DAILY
Qty: 30 TABLET | Refills: 5 | Status: SHIPPED | OUTPATIENT
Start: 2019-04-15 | End: 2020-01-22 | Stop reason: SDUPTHER

## 2019-04-15 NOTE — PROGRESS NOTES
Subjective       Roz Randolph is a 58 y.o. female.     Chief Complaint   Patient presents with   • Hyperlipidemia     follow up       History obtained from the patient.      History of Present Illness     Primary Care Cardiac [facility]: The patient is here today for a follow-up visit.    She was last seen on 6/11/18.    Her Hypertension is unstable.   Medication(s): Losartan HCTZ.   Her Hyperlipidemia has been unstable.   Her LDL goal is 130 mg/dL, last LDL was 188 mg/dL, .   Medication(s): Atorvastatin  The patient is mostly adherent with her medication regimen. She denies medication side effects.       Interval Events:  Atorvastatin was re-started after the visit 6/11/18 (had been off for 7 weeks prior to the appointment).  She states she had been taking it, but she did not get it refilled several months ago, not sure why.  The patient states her blood pressure at home has been 120's / 80's, but that she gets anxious at the doctor's visits.     Symptoms: Denies chest pain, dyspnea, PORTILLO, orthopnea, PND, palpitations, syncope, lower extremity edema, intermittent leg claudication, lightheadedness, and dizziness.  Associated symptoms:   Weight increased 7 pounds since last visit.  No fatigue, headache, polydipsia, polyuria, myalgias, arthralgias, memory loss, concentration problems, or focal neurologic deficits.      Lifestyle and Disease Management: Diet: She consumes a diverse and healthy diet.  Weight Issues: She has weight concerns. Exercise: She exercises daily Exercise includes walking.    Smoking: She does not use tobacco.      Hypothyroidism Follow-Up: The patient is being seen for follow-up of Hypothyroidism, which is stable.   Interval events: T4 and TSH were normal on 6/11/18.   Symptoms:   Weight increased 7 pounds since last visit.    Denies fatigue, cold intolerance, constipation, memory loss, trouble concentrating, hair loss, and dry skin.   Associated symptoms: no  myalgias, arthralgias, or paresthesias.   Medications:  Levothyroxine (Synthroid).   The patient is adherent to her medication regimen,  and she denies medication side effects.          Gastroesophageal Reflux Disease Follow-up: The patient is being seen for a routine clinic follow-up of Gastroesophageal Reflux Disease, which is stable.   Interval Events:  EGD 12/21/15 (small hiatal hernia, mild duodenitis, and concentric espohageal rings suggestive of esophsgitis- path c/w mild chronic esophagitis w/ increased eosinophils suggestive of reflux ([place]).  Symptoms:  No abdominal pain, heartburn, acid regurgitation, nausea, vomiting, dysphagia, odynophagia, hematemesis, hematochezia, melena, early satiety, belching, and bloating.  Associated symptoms:  no chronic sore throat, hoarseness, cough, or wheezing.   Medication: Tums prn.         Anxiety Disorder Follow-Up: The patient is being seen for follow-up of Anxiety, which is stable  She has no comorbid illnesses.   Interval Events:  None.   Symptoms:  stable anxiety, Denies depression, panic attacks, insomnia, memory loss, and difficulty concentrating.  Associated symptoms: no suicidal ideation.   Medication(s): Wellbutrin XL.   The patient is adherent to her medication regimen, and she denies medication side effects.           Current Outpatient Medications on File Prior to Visit   Medication Sig Dispense Refill   • buPROPion XL (WELLBUTRIN XL) 150 MG 24 hr tablet Take 1 tablet by mouth Every Morning. 90 tablet 0   • levothyroxine (SYNTHROID, LEVOTHROID) 75 MCG tablet Take 1 tablet by mouth Daily. 90 tablet 1   • losartan-hydrochlorothiazide (HYZAAR) 100-12.5 MG per tablet Take 1 tablet by mouth Daily. 90 tablet 3   • [DISCONTINUED] atorvastatin (LIPITOR) 10 MG tablet Take 1 tablet by mouth Daily. 30 tablet 5     No current facility-administered medications on file prior to visit.        Current outpatient and discharge medications have been reconciled for the  "patient.  Reviewed by: Maia Melton MD        The following portions of the patient's history were reviewed and updated as appropriate: allergies, current medications, past family history, past medical history, past social history, past surgical history and problem list.    Review of Systems   Constitutional: Positive for unexpected weight change. Negative for fatigue.   Eyes: Negative for visual disturbance.   Respiratory: Negative for cough, shortness of breath and wheezing.    Cardiovascular: Negative for chest pain, palpitations and leg swelling.        No PORTILLO, orthopnea, or claudication.   Gastrointestinal: Negative for abdominal pain, blood in stool, constipation, diarrhea, nausea and vomiting.        Denies melena.   Endocrine: Negative for polydipsia and polyuria.   Musculoskeletal: Positive for back pain (intermittent lower back pain since surgery, none today). Negative for arthralgias and myalgias.        Intermittent right foot pain for 2-3 weeks, none today.   Neurological: Negative for dizziness, syncope, light-headedness and headaches.        No memory issues.   Psychiatric/Behavioral: Negative for decreased concentration, sleep disturbance and suicidal ideas. The patient is nervous/anxious.          Objective       Blood pressure 162/90, pulse 97, resp. rate 16, height 161.3 cm (63.5\"), weight 76.2 kg (168 lb), SpO2 98 %.      Physical Exam   Constitutional:   Overweight.   Neck: Normal range of motion. Neck supple. Carotid bruit is not present. No thyromegaly present.   Cardiovascular: Normal rate, regular rhythm, normal heart sounds and intact distal pulses. Exam reveals no gallop and no friction rub.   No murmur heard.  No peripheral edema.   Pulmonary/Chest: Effort normal and breath sounds normal.   Abdominal: Soft. Bowel sounds are normal. She exhibits no distension, no abdominal bruit and no mass. There is no hepatosplenomegaly. There is no tenderness.   Psychiatric: She has a normal mood and " affect.   Nursing note and vitals reviewed.      Assessment / Plan:  Roz was seen today for hyperlipidemia.    Diagnoses and all orders for this visit:    Other hyperlipidemia  -     Lipid Panel  -     Comprehensive Metabolic Panel  -     atorvastatin (LIPITOR) 10 MG tablet; Take 1 tablet by mouth Daily.    Essential hypertension  -     POC Urinalysis Dipstick, Multipro    Acquired hypothyroidism  -     TSH  -     T4, Free    Gastroesophageal reflux disease without esophagitis    Generalized anxiety disorder    Need for hepatitis A immunization  -     Hepatitis A Vaccine Adult IM    Encounter for screening mammogram for breast cancer  -     Mammo Screening Digital Tomosynthesis Bilateral With CAD; Future      The patient declined examinations of her back and foot today, since her pain is not present.    I recommend Shingrix  (new Shingles vaccine) at the pharmacy.      Return in about 6 months (around 10/15/2019) for Annual physical, fasting.

## 2019-04-16 LAB
ALBUMIN SERPL-MCNC: 4.1 G/DL (ref 3.5–5.2)
ALBUMIN/GLOB SERPL: 1.1 G/DL
ALP SERPL-CCNC: 44 U/L (ref 39–117)
ALT SERPL W P-5'-P-CCNC: 64 U/L (ref 1–33)
ANION GAP SERPL CALCULATED.3IONS-SCNC: 18.1 MMOL/L
AST SERPL-CCNC: 52 U/L (ref 1–32)
BILIRUB SERPL-MCNC: 0.5 MG/DL (ref 0.2–1.2)
BUN BLD-MCNC: 12 MG/DL (ref 6–20)
BUN/CREAT SERPL: 14.8 (ref 7–25)
CALCIUM SPEC-SCNC: 9.4 MG/DL (ref 8.6–10.5)
CHLORIDE SERPL-SCNC: 96 MMOL/L (ref 98–107)
CHOLEST SERPL-MCNC: 254 MG/DL (ref 0–200)
CO2 SERPL-SCNC: 23.9 MMOL/L (ref 22–29)
CREAT BLD-MCNC: 0.81 MG/DL (ref 0.57–1)
GFR SERPL CREATININE-BSD FRML MDRD: 73 ML/MIN/1.73
GLOBULIN UR ELPH-MCNC: 3.6 GM/DL
GLUCOSE BLD-MCNC: 83 MG/DL (ref 65–99)
HDLC SERPL-MCNC: 69 MG/DL (ref 40–60)
LDLC SERPL CALC-MCNC: 146 MG/DL (ref 0–100)
LDLC/HDLC SERPL: 2.11 {RATIO}
POTASSIUM BLD-SCNC: 3.8 MMOL/L (ref 3.5–5.2)
PROT SERPL-MCNC: 7.7 G/DL (ref 6–8.5)
SODIUM BLD-SCNC: 138 MMOL/L (ref 136–145)
T4 FREE SERPL-MCNC: 1.13 NG/DL (ref 0.93–1.7)
TRIGL SERPL-MCNC: 197 MG/DL (ref 0–150)
TSH SERPL DL<=0.05 MIU/L-ACNC: 3.91 MIU/ML (ref 0.27–4.2)
VLDLC SERPL-MCNC: 39.4 MG/DL (ref 5–40)

## 2019-05-20 ENCOUNTER — OFFICE VISIT (OUTPATIENT)
Dept: INTERNAL MEDICINE | Facility: CLINIC | Age: 59
End: 2019-05-20

## 2019-05-20 ENCOUNTER — HOSPITAL ENCOUNTER (OUTPATIENT)
Dept: CT IMAGING | Facility: HOSPITAL | Age: 59
Discharge: HOME | End: 2019-05-20
Admitting: INTERNAL MEDICINE

## 2019-05-20 VITALS
WEIGHT: 165.5 LBS | SYSTOLIC BLOOD PRESSURE: 162 MMHG | TEMPERATURE: 98.2 F | RESPIRATION RATE: 20 BRPM | DIASTOLIC BLOOD PRESSURE: 100 MMHG | BODY MASS INDEX: 28.86 KG/M2 | HEART RATE: 96 BPM

## 2019-05-20 DIAGNOSIS — R10.32 LEFT LOWER QUADRANT PAIN: ICD-10-CM

## 2019-05-20 DIAGNOSIS — K57.33 DIVERTICULITIS OF LARGE INTESTINE WITHOUT PERFORATION OR ABSCESS WITH BLEEDING: Primary | ICD-10-CM

## 2019-05-20 DIAGNOSIS — K57.33 DIVERTICULITIS OF LARGE INTESTINE WITHOUT PERFORATION OR ABSCESS WITH BLEEDING: ICD-10-CM

## 2019-05-20 PROCEDURE — 99214 OFFICE O/P EST MOD 30 MIN: CPT | Performed by: INTERNAL MEDICINE

## 2019-05-20 PROCEDURE — 74178 CT ABD&PLV WO CNTR FLWD CNTR: CPT

## 2019-05-20 PROCEDURE — 25010000002 IOPAMIDOL 61 % SOLUTION: Performed by: INTERNAL MEDICINE

## 2019-05-20 PROCEDURE — 82565 ASSAY OF CREATININE: CPT

## 2019-05-20 PROCEDURE — 0 DIATRIZOATE MEGLUMINE & SODIUM PER 1 ML: Performed by: INTERNAL MEDICINE

## 2019-05-20 RX ORDER — METRONIDAZOLE 500 MG/1
500 TABLET ORAL 2 TIMES DAILY
Qty: 14 TABLET | Refills: 0 | Status: SHIPPED | OUTPATIENT
Start: 2019-05-20 | End: 2019-05-27

## 2019-05-20 RX ORDER — CIPROFLOXACIN 500 MG/1
500 TABLET, FILM COATED ORAL 2 TIMES DAILY
Qty: 14 TABLET | Refills: 0 | Status: SHIPPED | OUTPATIENT
Start: 2019-05-20 | End: 2019-05-27

## 2019-05-20 RX ADMIN — Medication 15 ML: at 17:15

## 2019-05-20 RX ADMIN — IOPAMIDOL 95 ML: 612 INJECTION, SOLUTION INTRAVENOUS at 18:26

## 2019-05-20 NOTE — PROGRESS NOTES
Subjective       Roz Randolph is a 58 y.o. female.     Chief Complaint   Patient presents with   • Rectal Bleeding     large amount    • Fever   • Abdominal Pain       History obtained from the patient.      Abdominal Pain   This is a new problem. Episode onset: 1 week ago. The problem has been gradually worsening. The pain is located in the LLQ. The quality of the pain is sharp. Pain radiation: down front of left leg. Associated symptoms include arthralgias, constipation (mild today), a fever (100 2 days ago), hematochezia (started 2 days ago), myalgias, nausea and weight loss (decreased 3 pounds). Pertinent negatives include no belching, diarrhea, dysuria, flatus, frequency, headaches, hematuria, melena or vomiting. Nothing aggravates the pain. The pain is relieved by nothing. She has tried nothing for the symptoms. Prior workup: None. Her past medical history is significant for abdominal surgery (Subtotal Colectomy for diverticulosis 4/13/18.  h/o MO/BSO, Appendectomy, and Cholecystectomy ) and GERD. There is no history of colon cancer, Crohn's disease, irritable bowel syndrome or ulcerative colitis.        The following portions of the patient's history were reviewed and updated as appropriate: allergies, current medications, past family history, past medical history, past social history, past surgical history and problem list.      Review of Systems   Constitutional: Positive for appetite change (mild decrease), chills, fever (100 2 days ago) and weight loss (decreased 3 pounds).   HENT: Negative for trouble swallowing.    Respiratory: Negative for cough, shortness of breath and wheezing.    Cardiovascular: Negative for chest pain.   Gastrointestinal: Positive for abdominal pain, blood in stool, constipation (mild today), hematochezia (started 2 days ago) and nausea. Negative for diarrhea, flatus, melena, rectal pain and vomiting.   Genitourinary: Negative for dysuria, frequency, hematuria, pelvic pain,  urgency, vaginal bleeding and vaginal discharge.   Musculoskeletal: Positive for arthralgias, joint swelling (hands) and myalgias. Negative for back pain.   Skin: Negative for rash.   Neurological: Negative for headaches.   Hematological: Negative for adenopathy.           Objective     Blood pressure 162/100, pulse 96, temperature 98.2 °F (36.8 °C), temperature source Temporal, resp. rate 20, weight 75.1 kg (165 lb 8 oz).    Physical Exam   Constitutional:   Overweight.   Cardiovascular: Normal rate, regular rhythm and normal heart sounds.   No murmur heard.  Pulmonary/Chest: Effort normal and breath sounds normal.   Abdominal: Soft. Bowel sounds are normal. She exhibits no distension and no mass. There is no hepatosplenomegaly. There is tenderness (diffuse). There is rebound and CVA tenderness (left). There is no guarding.   Genitourinary:   Genitourinary Comments: The patient declined a rectal exam.   Neurological: She is alert.   Skin: No rash noted.   Psychiatric: She has a normal mood and affect.   Nursing note and vitals reviewed.        Assessment/Plan   Roz was seen today for rectal bleeding, fever and abdominal pain.    Diagnoses and all orders for this visit:    Diverticulitis of large intestine without perforation or abscess with bleeding  -     metroNIDAZOLE (FLAGYL) 500 MG tablet; Take 1 tablet by mouth 2 (Two) Times a Day for 7 days.  -     ciprofloxacin (CIPRO) 500 MG tablet; Take 1 tablet by mouth 2 (Two) Times a Day for 7 days.  -     CT Abdomen Pelvis With & Without Contrast; Future    Left lower quadrant pain  -     CT Abdomen Pelvis With & Without Contrast; Future            Return if symptoms worsen or fail to improve.

## 2019-05-21 LAB — CREAT BLDA-MCNC: 0.8 MG/DL (ref 0.6–1.3)

## 2019-05-22 ENCOUNTER — TELEPHONE (OUTPATIENT)
Dept: INTERNAL MEDICINE | Facility: CLINIC | Age: 59
End: 2019-05-22

## 2019-07-01 DIAGNOSIS — F41.1 GENERALIZED ANXIETY DISORDER: ICD-10-CM

## 2019-07-01 RX ORDER — BUPROPION HYDROCHLORIDE 150 MG/1
150 TABLET ORAL EVERY MORNING
Qty: 90 TABLET | Refills: 1 | Status: SHIPPED | OUTPATIENT
Start: 2019-07-01 | End: 2020-01-22 | Stop reason: SDUPTHER

## 2019-07-03 ENCOUNTER — TELEPHONE (OUTPATIENT)
Dept: INTERNAL MEDICINE | Facility: CLINIC | Age: 59
End: 2019-07-03

## 2019-07-03 DIAGNOSIS — E03.9 ACQUIRED HYPOTHYROIDISM: ICD-10-CM

## 2019-07-03 RX ORDER — LEVOTHYROXINE SODIUM 0.07 MG/1
75 TABLET ORAL DAILY
Qty: 90 TABLET | Refills: 1 | Status: SHIPPED | OUTPATIENT
Start: 2019-07-03 | End: 2019-12-30 | Stop reason: SDUPTHER

## 2019-07-03 NOTE — TELEPHONE ENCOUNTER
Prescription e-rx'd.  The patient is due for a six-month follow-up, fasting, around October 15, 2019.  Please schedule.

## 2019-07-03 NOTE — TELEPHONE ENCOUNTER
----- Message from Kylah Vega sent at 7/3/2019 10:27 AM EDT -----  Patient states she needs a 90 day supply refill on levothyroxine (SYNTHROID, LEVOTHROID) 75 MCG tablet sent to [facility]. She can be reached at [phone]

## 2019-09-09 NOTE — TELEPHONE ENCOUNTER
----- Message from Radha Osullivan sent at 5/22/2019  3:01 PM EDT -----  Contact: Roz  Returning your call. Best call back number is [phone].  
Patient called back and states does she need to continue to take the CIPRO? She can be reached at [phone].      
See lab results note-    LMOVM to return call   
See results note   From 5/21/2019  
coffee

## 2019-10-11 ENCOUNTER — TRANSCRIBE ORDERS (OUTPATIENT)
Dept: ADMINISTRATIVE | Facility: HOSPITAL | Age: 59
End: 2019-10-11

## 2019-10-11 DIAGNOSIS — Z12.31 VISIT FOR SCREENING MAMMOGRAM: Primary | ICD-10-CM

## 2019-10-18 ENCOUNTER — OFFICE VISIT (OUTPATIENT)
Dept: INTERNAL MEDICINE | Facility: CLINIC | Age: 59
End: 2019-10-18

## 2019-10-18 VITALS
BODY MASS INDEX: 28.65 KG/M2 | OXYGEN SATURATION: 97 % | HEIGHT: 64 IN | DIASTOLIC BLOOD PRESSURE: 88 MMHG | HEART RATE: 94 BPM | TEMPERATURE: 98 F | RESPIRATION RATE: 18 BRPM | SYSTOLIC BLOOD PRESSURE: 152 MMHG | WEIGHT: 167.8 LBS

## 2019-10-18 DIAGNOSIS — Z23 NEED FOR VACCINATION AGAINST HEPATITIS A: ICD-10-CM

## 2019-10-18 DIAGNOSIS — F41.1 GENERALIZED ANXIETY DISORDER: ICD-10-CM

## 2019-10-18 DIAGNOSIS — R53.83 FATIGUE, UNSPECIFIED TYPE: ICD-10-CM

## 2019-10-18 DIAGNOSIS — Z23 NEED FOR INFLUENZA VACCINATION: ICD-10-CM

## 2019-10-18 DIAGNOSIS — I10 ESSENTIAL HYPERTENSION: ICD-10-CM

## 2019-10-18 DIAGNOSIS — E78.49 OTHER HYPERLIPIDEMIA: Primary | ICD-10-CM

## 2019-10-18 DIAGNOSIS — E03.9 ACQUIRED HYPOTHYROIDISM: ICD-10-CM

## 2019-10-18 LAB
25(OH)D3 SERPL-MCNC: 10.2 NG/ML (ref 30–100)
ALBUMIN SERPL-MCNC: 4.6 G/DL (ref 3.5–5.2)
ALBUMIN/GLOB SERPL: 1.5 G/DL
ALP SERPL-CCNC: 52 U/L (ref 39–117)
ALT SERPL W P-5'-P-CCNC: 82 U/L (ref 1–33)
ANION GAP SERPL CALCULATED.3IONS-SCNC: 14.2 MMOL/L (ref 5–15)
AST SERPL-CCNC: 93 U/L (ref 1–32)
BASOPHILS # BLD AUTO: 0.04 10*3/MM3 (ref 0–0.2)
BASOPHILS NFR BLD AUTO: 0.5 % (ref 0–1.5)
BILIRUB SERPL-MCNC: 0.6 MG/DL (ref 0.2–1.2)
BUN BLD-MCNC: 14 MG/DL (ref 6–20)
BUN/CREAT SERPL: 16.3 (ref 7–25)
CALCIUM SPEC-SCNC: 9.2 MG/DL (ref 8.6–10.5)
CHLORIDE SERPL-SCNC: 100 MMOL/L (ref 98–107)
CHOLEST SERPL-MCNC: 214 MG/DL (ref 0–200)
CLARITY, POC: CLEAR
CO2 SERPL-SCNC: 24.8 MMOL/L (ref 22–29)
COLOR UR: YELLOW
CREAT BLD-MCNC: 0.86 MG/DL (ref 0.57–1)
DEPRECATED RDW RBC AUTO: 41.1 FL (ref 37–54)
EOSINOPHIL # BLD AUTO: 0.4 10*3/MM3 (ref 0–0.4)
EOSINOPHIL NFR BLD AUTO: 5.2 % (ref 0.3–6.2)
ERYTHROCYTE [DISTWIDTH] IN BLOOD BY AUTOMATED COUNT: 11.6 % (ref 12.3–15.4)
EXPIRATION DATE: NORMAL
GFR SERPL CREATININE-BSD FRML MDRD: 68 ML/MIN/1.73
GLOBULIN UR ELPH-MCNC: 3.1 GM/DL
GLUCOSE BLD-MCNC: 96 MG/DL (ref 65–99)
GLUCOSE UR STRIP-MCNC: NEGATIVE MG/DL
HCT VFR BLD AUTO: 39.9 % (ref 34–46.6)
HDLC SERPL-MCNC: 63 MG/DL (ref 40–60)
HGB BLD-MCNC: 14 G/DL (ref 12–15.9)
IMM GRANULOCYTES # BLD AUTO: 0.03 10*3/MM3 (ref 0–0.05)
IMM GRANULOCYTES NFR BLD AUTO: 0.4 % (ref 0–0.5)
KETONES UR QL: NEGATIVE
LDLC SERPL CALC-MCNC: 122 MG/DL (ref 0–100)
LDLC/HDLC SERPL: 1.94 {RATIO}
LEUKOCYTE EST, POC: NEGATIVE
LYMPHOCYTES # BLD AUTO: 1.67 10*3/MM3 (ref 0.7–3.1)
LYMPHOCYTES NFR BLD AUTO: 21.8 % (ref 19.6–45.3)
Lab: NORMAL
MCH RBC QN AUTO: 34.2 PG (ref 26.6–33)
MCHC RBC AUTO-ENTMCNC: 35.1 G/DL (ref 31.5–35.7)
MCV RBC AUTO: 97.6 FL (ref 79–97)
MONOCYTES # BLD AUTO: 0.65 10*3/MM3 (ref 0.1–0.9)
MONOCYTES NFR BLD AUTO: 8.5 % (ref 5–12)
NEUTROPHILS # BLD AUTO: 4.87 10*3/MM3 (ref 1.7–7)
NEUTROPHILS NFR BLD AUTO: 63.6 % (ref 42.7–76)
NITRITE UR-MCNC: NEGATIVE MG/ML
NRBC BLD AUTO-RTO: 0 /100 WBC (ref 0–0.2)
PH UR: 5.5 [PH] (ref 5–8)
PLATELET # BLD AUTO: 253 10*3/MM3 (ref 140–450)
PMV BLD AUTO: 10.4 FL (ref 6–12)
POTASSIUM BLD-SCNC: 3.9 MMOL/L (ref 3.5–5.2)
PROT SERPL-MCNC: 7.7 G/DL (ref 6–8.5)
PROT UR STRIP-MCNC: NEGATIVE MG/DL
PROT/CREAT UR: 300 MG/G CREA
RBC # BLD AUTO: 4.09 10*6/MM3 (ref 3.77–5.28)
RBC # UR STRIP: NEGATIVE /UL
SODIUM BLD-SCNC: 139 MMOL/L (ref 136–145)
SP GR UR: 1.02 (ref 1–1.03)
TRIGL SERPL-MCNC: 143 MG/DL (ref 0–150)
TSH SERPL DL<=0.05 MIU/L-ACNC: 3.12 UIU/ML (ref 0.27–4.2)
VIT B12 BLD-MCNC: 614 PG/ML (ref 211–946)
VLDLC SERPL-MCNC: 28.6 MG/DL (ref 5–40)
WBC NRBC COR # BLD: 7.66 10*3/MM3 (ref 3.4–10.8)

## 2019-10-18 PROCEDURE — 80061 LIPID PANEL: CPT | Performed by: INTERNAL MEDICINE

## 2019-10-18 PROCEDURE — 82306 VITAMIN D 25 HYDROXY: CPT | Performed by: INTERNAL MEDICINE

## 2019-10-18 PROCEDURE — 90686 IIV4 VACC NO PRSV 0.5 ML IM: CPT | Performed by: INTERNAL MEDICINE

## 2019-10-18 PROCEDURE — 90472 IMMUNIZATION ADMIN EACH ADD: CPT | Performed by: INTERNAL MEDICINE

## 2019-10-18 PROCEDURE — 99214 OFFICE O/P EST MOD 30 MIN: CPT | Performed by: INTERNAL MEDICINE

## 2019-10-18 PROCEDURE — 80053 COMPREHEN METABOLIC PANEL: CPT | Performed by: INTERNAL MEDICINE

## 2019-10-18 PROCEDURE — 36415 COLL VENOUS BLD VENIPUNCTURE: CPT | Performed by: INTERNAL MEDICINE

## 2019-10-18 PROCEDURE — 85025 COMPLETE CBC W/AUTO DIFF WBC: CPT | Performed by: INTERNAL MEDICINE

## 2019-10-18 PROCEDURE — 81003 URINALYSIS AUTO W/O SCOPE: CPT | Performed by: INTERNAL MEDICINE

## 2019-10-18 PROCEDURE — 90471 IMMUNIZATION ADMIN: CPT | Performed by: INTERNAL MEDICINE

## 2019-10-18 PROCEDURE — 90632 HEPA VACCINE ADULT IM: CPT | Performed by: INTERNAL MEDICINE

## 2019-10-18 PROCEDURE — 82607 VITAMIN B-12: CPT | Performed by: INTERNAL MEDICINE

## 2019-10-18 PROCEDURE — 84443 ASSAY THYROID STIM HORMONE: CPT | Performed by: INTERNAL MEDICINE

## 2019-10-18 NOTE — PATIENT INSTRUCTIONS
Recommend Allegra and Flonase for the allergies and cough.      I recommend Shingrix (new Shingles vaccine) at the pharmacy.      Heart-Healthy Eating Plan  Many factors influence your heart (coronary) health, including eating and exercise habits. Coronary risk increases with abnormal blood fat (lipid) levels. Heart-healthy meal planning includes limiting unhealthy fats, increasing healthy fats, and making other diet and lifestyle changes.  What is my plan?  Your health care provider may recommend that you:  · Limit your fat intake to _________% or less of your total calories each day.  · Limit your saturated fat intake to _________% or less of your total calories each day.  · Limit the amount of cholesterol in your diet to less than _________ mg per day.  What are tips for following this plan?  Cooking  Cook foods using methods other than frying. Baking, boiling, grilling, and broiling are all good options. Other ways to reduce fat include:  · Removing the skin from poultry.  · Removing all visible fats from meats.  · Steaming vegetables in water or broth.  Meal planning    · At meals, imagine dividing your plate into fourths:  ? Fill one-half of your plate with vegetables and green salads.  ? Fill one-fourth of your plate with whole grains.  ? Fill one-fourth of your plate with lean protein foods.  · Eat 4-5 servings of vegetables per day. One serving equals 1 cup raw or cooked vegetable, or 2 cups raw leafy greens.  · Eat 4-5 servings of fruit per day. One serving equals 1 medium whole fruit, ¼ cup dried fruit, ½ cup fresh, frozen, or canned fruit, or ½ cup 100% fruit juice.  · Eat more foods that contain soluble fiber. Examples include apples, broccoli, carrots, beans, peas, and barley. Aim to get 25-30 g of fiber per day.  · Increase your consumption of legumes, nuts, and seeds to 4-5 servings per week. One serving of dried beans or legumes equals ½ cup cooked, 1 serving of nuts is ¼ cup, and 1 serving of seeds  equals 1 tablespoon.  Fats  · Choose healthy fats more often. Choose monounsaturated and polyunsaturated fats, such as olive and canola oils, flaxseeds, walnuts, almonds, and seeds.  · Eat more omega-3 fats. Choose salmon, mackerel, sardines, tuna, flaxseed oil, and ground flaxseeds. Aim to eat fish at least 2 times each week.  · Check food labels carefully to identify foods with trans fats or high amounts of saturated fat.  · Limit saturated fats. These are found in animal products, such as meats, butter, and cream. Plant sources of saturated fats include palm oil, palm kernel oil, and coconut oil.  · Avoid foods with partially hydrogenated oils in them. These contain trans fats. Examples are stick margarine, some tub margarines, cookies, crackers, and other baked goods.  · Avoid fried foods.  General information  · Eat more home-cooked food and less restaurant, buffet, and fast food.  · Limit or avoid alcohol.  · Limit foods that are high in starch and sugar.  · Lose weight if you are overweight. Losing just 5-10% of your body weight can help your overall health and prevent diseases such as diabetes and heart disease.  · Monitor your salt (sodium) intake, especially if you have high blood pressure. Talk with your health care provider about your sodium intake.  · Try to incorporate more vegetarian meals weekly.  What foods can I eat?  Fruits  All fresh, canned (in natural juice), or frozen fruits.  Vegetables  Fresh or frozen vegetables (raw, steamed, roasted, or grilled). Green salads.  Grains  Most grains. Choose whole wheat and whole grains most of the time. Rice and pasta, including brown rice and pastas made with whole wheat.  Meats and other proteins  Lean, well-trimmed beef, veal, pork, and lamb. Chicken and turkey without skin. All fish and shellfish. Wild duck, rabbit, pheasant, and venison. Egg whites or low-cholesterol egg substitutes. Dried beans, peas, lentils, and tofu. Seeds and most  nuts.  Dairy  Low-fat or nonfat cheeses, including ricotta and mozzarella. Skim or 1% milk (liquid, powdered, or evaporated). Buttermilk made with low-fat milk. Nonfat or low-fat yogurt.  Fats and oils  Non-hydrogenated (trans-free) margarines. Vegetable oils, including soybean, sesame, sunflower, olive, peanut, safflower, corn, canola, and cottonseed. Salad dressings or mayonnaise made with a vegetable oil.  Beverages  Water (mineral or sparkling). Coffee and tea. Diet carbonated beverages.  Sweets and desserts  Sherbet, gelatin, and fruit ice. Small amounts of dark chocolate.  Limit all sweets and desserts.  Seasonings and condiments  All seasonings and condiments.  The items listed above may not be a complete list of foods and beverages you can eat. Contact a dietitian for more options.  What foods are not recommended?  Fruits  Canned fruit in heavy syrup. Fruit in cream or butter sauce. Fried fruit. Limit coconut.  Vegetables  Vegetables cooked in cheese, cream, or butter sauce. Fried vegetables.  Grains  Breads made with saturated or trans fats, oils, or whole milk. Croissants. Sweet rolls. Donuts. High-fat crackers, such as cheese crackers.  Meats and other proteins  Fatty meats, such as hot dogs, ribs, sausage, roche, rib-eye roast or steak. High-fat deli meats, such as salami and bologna. Caviar. Domestic duck and goose. Organ meats, such as liver.  Dairy  Cream, sour cream, cream cheese, and creamed cottage cheese. Whole milk cheeses. Whole or 2% milk (liquid, evaporated, or condensed). Whole buttermilk. Cream sauce or high-fat cheese sauce. Whole-milk yogurt.  Fats and oils  Meat fat, or shortening. Cocoa butter, hydrogenated oils, palm oil, coconut oil, palm kernel oil. Solid fats and shortenings, including roche fat, salt pork, lard, and butter. Nondairy cream substitutes. Salad dressings with cheese or sour cream.  Beverages  Regular sodas and any drinks with added sugar.  Sweets and desserts  Frosting.  Pudding. Cookies. Cakes. Pies. Milk chocolate or white chocolate. Buttered syrups. Full-fat ice cream or ice cream drinks.  The items listed above may not be a complete list of foods and beverages to avoid. Contact a dietitian for more information.  Summary  · Heart-healthy meal planning includes limiting unhealthy fats, increasing healthy fats, and making other diet and lifestyle changes.  · Lose weight if you are overweight. Losing just 5-10% of your body weight can help your overall health and prevent diseases such as diabetes and heart disease.  · Focus on eating a balance of foods, including fruits and vegetables, low-fat or nonfat dairy, lean protein, nuts and legumes, whole grains, and heart-healthy oils and fats.  This information is not intended to replace advice given to you by your health care provider. Make sure you discuss any questions you have with your health care provider.  Document Released: 09/26/2009 Document Revised: 01/25/2019 Document Reviewed: 01/25/2019  [employer] Interactive Patient Education © 2019 [employer]      Exercising to Lose Weight  Exercise is structured, repetitive physical activity to improve fitness and health. Getting regular exercise is important for everyone. It is especially important if you are overweight. Being overweight increases your risk of heart disease, stroke, diabetes, high blood pressure, and several types of cancer. Reducing your calorie intake and exercising can help you lose weight.  Exercise is usually categorized as moderate or vigorous intensity. To lose weight, most people need to do a certain amount of moderate-intensity or vigorous-intensity exercise each week.  Moderate-intensity exercise    Moderate-intensity exercise is any activity that gets you moving enough to burn at least three times more energy (calories) than if you were sitting.  Examples of moderate exercise include:  · Walking a mile in 15 minutes.  · Doing light yard work.  · Biking at an  easy pace.  Most people should get at least 150 minutes (2 hours and 30 minutes) a week of moderate-intensity exercise to maintain their body weight.  Vigorous-intensity exercise  Vigorous-intensity exercise is any activity that gets you moving enough to burn at least six times more calories than if you were sitting. When you exercise at this intensity, you should be working hard enough that you are not able to carry on a conversation.  Examples of vigorous exercise include:  · Running.  · Playing a team sport, such as football, basketball, and soccer.  · Jumping rope.  Most people should get at least 75 minutes (1 hour and 15 minutes) a week of vigorous-intensity exercise to maintain their body weight.  How can exercise affect me?  When you exercise enough to burn more calories than you eat, you lose weight. Exercise also reduces body fat and builds muscle. The more muscle you have, the more calories you burn. Exercise also:  · Improves mood.  · Reduces stress and tension.  · Improves your overall fitness, flexibility, and endurance.  · Increases bone strength.  The amount of exercise you need to lose weight depends on:  · Your age.  · The type of exercise.  · Any health conditions you have.  · Your overall physical ability.  Talk to your health care provider about how much exercise you need and what types of activities are safe for you.  What actions can I take to lose weight?  Nutrition    · Make changes to your diet as told by your health care provider or diet and nutrition specialist (dietitian). This may include:  ? Eating fewer calories.  ? Eating more protein.  ? Eating less unhealthy fats.  ? Eating a diet that includes fresh fruits and vegetables, whole grains, low-fat dairy products, and lean protein.  ? Avoiding foods with added fat, salt, and sugar.  · Drink plenty of water while you exercise to prevent dehydration or heat stroke.  Activity  · Choose an activity that you enjoy and set realistic goals.  Your health care provider can help you make an exercise plan that works for you.  · Exercise at a moderate or vigorous intensity most days of the week.  ? The intensity of exercise may vary from person to person. You can tell how intense a workout is for you by paying attention to your breathing and heartbeat. Most people will notice their breathing and heartbeat get faster with more intense exercise.  · Do resistance training twice each week, such as:  ? Push-ups.  ? Sit-ups.  ? Lifting weights.  ? Using resistance bands.  · Getting short amounts of exercise can be just as helpful as long structured periods of exercise. If you have trouble finding time to exercise, try to include exercise in your daily routine.  ? Get up, stretch, and walk around every 30 minutes throughout the day.  ? Go for a walk during your lunch break.  ? Park your car farther away from your destination.  ? If you take public transportation, get off one stop early and walk the rest of the way.  ? Make phone calls while standing up and walking around.  ? Take the stairs instead of elevators or escalators.  · Wear comfortable clothes and shoes with good support.  · Do not exercise so much that you hurt yourself, feel dizzy, or get very short of breath.  Where to find more information  · [place] Department of Health and Human Services: www.hhs.gov  · Centers for Disease Control and Prevention (CDC): www.cdc.gov  Contact a health care provider:  · Before starting a new exercise program.  · If you have questions or concerns about your weight.  · If you have a medical problem that keeps you from exercising.  Get help right away if you have any of the following while exercising:  · Injury.  · Dizziness.  · Difficulty breathing or shortness of breath that does not go away when you stop exercising.  · Chest pain.  · Rapid heartbeat.  Summary  · Being overweight increases your risk of heart disease, stroke, diabetes, high blood pressure, and several types  of cancer.  · Losing weight happens when you burn more calories than you eat.  · Reducing the amount of calories you eat in addition to getting regular moderate or vigorous exercise each week helps you lose weight.  This information is not intended to replace advice given to you by your health care provider. Make sure you discuss any questions you have with your health care provider.  Document Released: 01/20/2012 Document Revised: 12/31/2018 Document Reviewed: 12/31/2018  [employer] Interactive Patient Education © 2019 [employer]

## 2019-10-18 NOTE — PROGRESS NOTES
Subjective       Roz Randolph is a 59 y.o. female.     Chief Complaint   Patient presents with   • Hyperlipidemia     6 month F/U fasting   • Immunizations     Second Hep A       History obtained from the patient.      History of Present Illness     Primary Care Cardiac Diagnostic Constellation: The patient is here today for a follow-up visit.    She was last seen on 6/11/18.     Her Hypertension is unstable.   Medication(s): Losartan HCTZ.   Her Hyperlipidemia has been unstable.   Her LDL goal is < 130 mg/dL, last LDL was 146 mg/dL, .   Medication(s): Atorvastatin  The patient is mostly adherent with her medication regimen. She denies medication side effects.       Interval Events:  None.     Symptoms:   has had some palpitations (increased heart rate).  Denies chest pain, dyspnea, PORTILLO, orthopnea, PND,  syncope, lower extremity edema, intermittent leg claudication, lightheadedness, and dizziness.  Associated symptoms:  No significant weight change since last visit. Complains of fatigue.  No headache, polydipsia, polyuria, myalgias, arthralgias, memory loss, concentration problems, or focal neurologic deficits.      Lifestyle and Disease Management: Diet: She consumes a diverse and healthy diet.  Weight Issues: She has weight concerns. Exercise: She exercises daily Exercise includes walking 3 times per week.    Smoking: She does not use tobacco.      Hypothyroidism Follow-Up: The patient is being seen for follow-up of Hypothyroidism, which is stable.   Interval events: None.   Symptoms:   No significant weight change since last visit. Complains of fatigue. Denies cold intolerance, constipation, memory loss, trouble concentrating, hair loss, and dry skin.   Associated Symptoms: no myalgias, arthralgias, or paresthesias.   Medications:  Levothyroxine (Synthroid).   The patient is adherent to her medication regimen,  and she denies medication side effects.          Gastroesophageal Reflux Disease Follow-up:  The patient is being seen for a routine clinic follow-up of Gastroesophageal Reflux Disease, which is stable.   Interval Events:  EGD 12/21/15 (small hiatal hernia, mild duodenitis, and concentric espohageal rings suggestive of esophsgitis- path c/w mild chronic esophagitis w/ increased eosinophils suggestive of reflux ([place]).  Symptoms:  No abdominal pain, heartburn, acid regurgitation, nausea, vomiting, dysphagia, odynophagia, hematemesis, hematochezia, melena, early satiety, belching, and bloating.  Associated Symptoms:  no chronic sore throat, hoarseness, cough, or wheezing.   Medication: Tums prn.         Anxiety Disorder Follow-Up: The patient is being seen for follow-up of Anxiety, which is worsened  Comorbid Illnesses None.   Interval Events:  None.   Symptoms: worsened anxiety.  Has had some panic attacks past few months.  Denies depression,  insomnia, memory loss, and difficulty concentrating.  Associated Symptoms: no suicidal ideation.   Medication(s): Wellbutrin XL.   The patient is adherent to her medication regimen, and she denies medication side effects.        Current Outpatient Medications on File Prior to Visit   Medication Sig Dispense Refill   • atorvastatin (LIPITOR) 10 MG tablet Take 1 tablet by mouth Daily. 30 tablet 5   • buPROPion XL (WELLBUTRIN XL) 150 MG 24 hr tablet Take 1 tablet by mouth Every Morning. 90 tablet 1   • levothyroxine (SYNTHROID, LEVOTHROID) 75 MCG tablet Take 1 tablet by mouth Daily. 90 tablet 1   • losartan-hydrochlorothiazide (HYZAAR) 100-12.5 MG per tablet Take 1 tablet by mouth Daily. 90 tablet 3     No current facility-administered medications on file prior to visit.        Current outpatient and discharge medications have been reconciled for the patient.  Reviewed by: Maia Melton MD        The following portions of the patient's history were reviewed and updated as appropriate: allergies, current medications, past family history, past medical history, past  "social history, past surgical history and problem list.    Review of Systems   Constitutional: Positive for fatigue. Negative for unexpected weight change.   HENT: Positive for postnasal drip.    Eyes: Negative for visual disturbance.   Respiratory: Positive for cough. Negative for shortness of breath and wheezing.    Cardiovascular: Positive for palpitations. Negative for chest pain and leg swelling.        No PORTILLO, orthopnea, or claudication.   Gastrointestinal: Negative for abdominal pain, blood in stool, constipation, diarrhea, nausea and vomiting.        Denies melena.   Endocrine: Negative for polydipsia and polyuria.   Musculoskeletal: Negative for arthralgias and myalgias.   Neurological: Negative for dizziness, syncope, light-headedness and headaches.        No memory issues.   Psychiatric/Behavioral: Negative for decreased concentration. The patient is nervous/anxious.          Objective       Blood pressure 152/88, pulse 94, temperature 98 °F (36.7 °C), temperature source Temporal, resp. rate 18, height 161.3 cm (63.5\"), weight 76.1 kg (167 lb 12.8 oz), SpO2 97 %.      Physical Exam   Constitutional:   Overweight.   Neck: Normal range of motion. Neck supple. Carotid bruit is not present. No thyromegaly present.   Cardiovascular: Normal rate, regular rhythm, normal heart sounds and intact distal pulses. Exam reveals no gallop and no friction rub.   No murmur heard.  No peripheral edema.   Pulmonary/Chest: Effort normal and breath sounds normal.   Abdominal: Soft. Bowel sounds are normal. She exhibits no distension, no abdominal bruit and no mass. There is no hepatosplenomegaly. There is no tenderness.   Psychiatric: She has a normal mood and affect.   Nursing note and vitals reviewed.    Results for orders placed or performed in visit on 10/18/19   POC Urinalysis Dipstick, Multipro   Result Value Ref Range    Color Yellow Yellow, Straw, Dark Yellow, María Elena    Clarity, UA Clear Clear    Glucose, UA Negative " Negative, 1000 mg/dL (3+) mg/dL    Ketones, UA Negative Negative    Specific Gravity  1.025 1.005 - 1.030    Blood, UA Negative Negative    pH, Urine 5.5 5.0 - 8.0    Protein, POC Negative Negative mg/dL    Nitrite, UA Negative Negative    Leukocytes Negative Negative    Protein/Creatinine Ratio, Urine 300.0 mg/G Crea    Lot Number [id number]     Expiration Date 3-31-20        Assessment / Plan:  Roz was seen today for hyperlipidemia and immunizations.    Diagnoses and all orders for this visit:    Other hyperlipidemia  -     TSH  -     Lipid Panel  -     Comprehensive Metabolic Panel   Continue current medication(s) as noted in the history of present illness.    Essential hypertension  -     TSH  -     Lipid Panel  -     Comprehensive Metabolic Panel  -     POC Urinalysis Dipstick, Multipro   Continue current medication(s) as noted in the history of present illness.    Acquired hypothyroidism  -     TSH   Continue current medication(s) as noted in the history of present illness.    Generalized anxiety disorder   Continue current medication(s) as noted in the history of present illness.    Fatigue, unspecified type  -     CBC & Differential  -     Vitamin D 25 Hydroxy  -     Vitamin B12  -     CBC Auto Differential    Need for influenza vaccination  -     Fluarix/Fluzone/Afluria Quad>6 Months    Need for vaccination against hepatitis A  -     Hepatitis A Vaccine Adult IM      Recommend Allegra and Flonase for the allergies and cough.      I recommend Shingrix (new Shingles vaccine) at the pharmacy.      Return in about 6 months (around 4/18/2020) for Annual physical, fasting.

## 2019-10-22 RX ORDER — ERGOCALCIFEROL 1.25 MG/1
50000 CAPSULE ORAL WEEKLY
Qty: 4 CAPSULE | Refills: 3 | Status: SHIPPED | OUTPATIENT
Start: 2019-10-22 | End: 2020-06-16

## 2019-10-26 PROBLEM — E55.9 VITAMIN D DEFICIENCY: Status: ACTIVE | Noted: 2019-10-26

## 2019-12-26 ENCOUNTER — HOSPITAL ENCOUNTER (OUTPATIENT)
Dept: MAMMOGRAPHY | Facility: HOSPITAL | Age: 59
Discharge: HOME | End: 2019-12-26
Admitting: INTERNAL MEDICINE

## 2019-12-26 DIAGNOSIS — Z12.31 VISIT FOR SCREENING MAMMOGRAM: ICD-10-CM

## 2019-12-26 PROCEDURE — 77067 SCR MAMMO BI INCL CAD: CPT | Performed by: RADIOLOGY

## 2019-12-26 PROCEDURE — 77067 SCR MAMMO BI INCL CAD: CPT

## 2019-12-26 PROCEDURE — 77063 BREAST TOMOSYNTHESIS BI: CPT | Performed by: RADIOLOGY

## 2019-12-26 PROCEDURE — 77063 BREAST TOMOSYNTHESIS BI: CPT

## 2019-12-30 DIAGNOSIS — E03.9 ACQUIRED HYPOTHYROIDISM: ICD-10-CM

## 2019-12-30 RX ORDER — LEVOTHYROXINE SODIUM 0.07 MG/1
75 TABLET ORAL DAILY
Qty: 90 TABLET | Refills: 1 | Status: SHIPPED | OUTPATIENT
Start: 2019-12-30 | End: 2020-03-27 | Stop reason: SDUPTHER

## 2019-12-30 NOTE — TELEPHONE ENCOUNTER
Pt needing refill on Levothyroxine, 75 mcg, 1 daily    [facility]

## 2020-01-16 ENCOUNTER — TELEPHONE (OUTPATIENT)
Dept: INTERNAL MEDICINE | Facility: CLINIC | Age: 60
End: 2020-01-16

## 2020-01-16 NOTE — TELEPHONE ENCOUNTER
Mary from [facility] called and stated that losartan-hydrochlorothiazide (HYZAAR) 100-12.5 MG per tablet is on back order. She wants to know if she can split the 2 components. Please call [facility] pharmacy to advise.

## 2020-01-16 NOTE — TELEPHONE ENCOUNTER
[facility] [phone]  Radha  Spoke to pharmacy, advised of clinical message. Good verbal understanding. Closing call.

## 2020-01-22 ENCOUNTER — TELEPHONE (OUTPATIENT)
Dept: INTERNAL MEDICINE | Facility: CLINIC | Age: 60
End: 2020-01-22

## 2020-01-22 DIAGNOSIS — F41.1 GENERALIZED ANXIETY DISORDER: ICD-10-CM

## 2020-01-22 DIAGNOSIS — E78.49 OTHER HYPERLIPIDEMIA: ICD-10-CM

## 2020-01-22 RX ORDER — ATORVASTATIN CALCIUM 10 MG/1
10 TABLET, FILM COATED ORAL DAILY
Qty: 30 TABLET | Refills: 5 | Status: SHIPPED | OUTPATIENT
Start: 2020-01-22 | End: 2020-11-19 | Stop reason: SDUPTHER

## 2020-01-22 RX ORDER — BUPROPION HYDROCHLORIDE 150 MG/1
150 TABLET ORAL EVERY MORNING
Qty: 90 TABLET | Refills: 1 | Status: SHIPPED | OUTPATIENT
Start: 2020-01-22 | End: 2020-08-10 | Stop reason: SDUPTHER

## 2020-01-22 NOTE — TELEPHONE ENCOUNTER
Patient called to request Rx refills on atorvastatin (LIPITOR) 10 MG tablet and buPROPion XL (WELLBUTRIN XL) 150 MG 24 hr tablet.    [facility] confirmed    Patient Callback # [phone]

## 2020-03-27 DIAGNOSIS — E03.9 ACQUIRED HYPOTHYROIDISM: ICD-10-CM

## 2020-03-27 RX ORDER — LEVOTHYROXINE SODIUM 0.07 MG/1
75 TABLET ORAL DAILY
Qty: 90 TABLET | Refills: 1 | Status: SHIPPED | OUTPATIENT
Start: 2020-03-27 | End: 2020-06-23 | Stop reason: SDUPTHER

## 2020-03-27 NOTE — TELEPHONE ENCOUNTER
PT NEEDS AN REFILL ON levothyroxine (SYNTHROID, LEVOTHROID) 75 MCG tablet.     SENT TO [facility].     PLEASE ADVISE [phone].

## 2020-04-13 ENCOUNTER — TELEPHONE (OUTPATIENT)
Dept: INTERNAL MEDICINE | Facility: CLINIC | Age: 60
End: 2020-04-13

## 2020-04-13 NOTE — TELEPHONE ENCOUNTER
Patient states that she has a sinus infection and would liketo know if there is an antibiotic that could be called in to the [facility]. She states that he teeth hurt, eyes, pressure in sinuses.   Please advise.

## 2020-04-14 ENCOUNTER — OFFICE VISIT (OUTPATIENT)
Dept: INTERNAL MEDICINE | Facility: CLINIC | Age: 60
End: 2020-04-14

## 2020-04-14 VITALS
SYSTOLIC BLOOD PRESSURE: 163 MMHG | DIASTOLIC BLOOD PRESSURE: 78 MMHG | TEMPERATURE: 97.7 F | WEIGHT: 168 LBS | BODY MASS INDEX: 29.29 KG/M2

## 2020-04-14 DIAGNOSIS — J06.9 UPPER RESPIRATORY TRACT INFECTION, UNSPECIFIED TYPE: Primary | ICD-10-CM

## 2020-04-14 PROCEDURE — 99441 PR PHYS/QHP TELEPHONE EVALUATION 5-10 MIN: CPT | Performed by: INTERNAL MEDICINE

## 2020-04-14 RX ORDER — CEFDINIR 300 MG/1
300 CAPSULE ORAL 2 TIMES DAILY
Qty: 20 CAPSULE | Refills: 0 | Status: SHIPPED | OUTPATIENT
Start: 2020-04-14 | End: 2020-04-24

## 2020-04-14 NOTE — TELEPHONE ENCOUNTER
Roz Randolph [phone]  Spoke to pt, advised of clinical message. Pt is in agreement with plan. Pt requests a telephone visit, since she doesn't have video output on her phone at this time. Scheduled today @ 12:30. Good verbal understanding.  FORTINO.

## 2020-04-14 NOTE — PROGRESS NOTES
Subjective       Roz Randolph is a 59 y.o. female.     Chief Complaint   Patient presents with   • Sinusitis       History obtained from the patient.    You have chosen to receive care through a telephone visit. Do you consent to use a telephone visit for your medical care today? Yes        URI    This is a new problem. Episode onset: 1 week ago. The problem has been gradually worsening. There has been no fever. Associated symptoms include congestion, coughing (dry, non-roductive), rhinorrhea (clear and yellow), sinus pain, sneezing and swollen glands. Pertinent negatives include no abdominal pain, chest pain, diarrhea, ear pain, headaches, joint pain, joint swelling, nausea, neck pain, plugged ear sensation, rash, sore throat, vomiting or wheezing. She has tried acetaminophen (and Claritin) for the symptoms. The treatment provided mild relief.      She is a non-smoker.    The following portions of the patient's history were reviewed and updated as appropriate: allergies, current medications, past family history, past medical history, past social history, past surgical history and problem list.      Review of Systems   Constitutional: Negative for chills, fatigue and fever.   HENT: Positive for congestion, postnasal drip, rhinorrhea (clear and yellow), sinus pressure, sinus pain and sneezing. Negative for ear pain, sore throat and voice change.    Eyes: Positive for pain and itching. Negative for discharge and redness.   Respiratory: Positive for cough (dry, non-roductive). Negative for shortness of breath and wheezing.    Cardiovascular: Negative for chest pain.   Gastrointestinal: Negative for abdominal pain, diarrhea, nausea and vomiting.   Musculoskeletal: Negative for arthralgias, joint pain, joint swelling, myalgias, neck pain and neck stiffness.   Skin: Negative for rash.   Neurological: Negative for headaches.   Hematological: Positive for adenopathy.           Objective     Blood pressure 163/78,  temperature 97.7 °F (36.5 °C), weight 76.2 kg (168 lb).    Physical Exam   Neurological: She is alert.   Psychiatric:   Normal mood.   Vitals reviewed.        Assessment/Plan   Roz was seen today for sinusitis.    Diagnoses and all orders for this visit:    Upper respiratory tract infection, unspecified type  -     cefdinir (OMNICEF) 300 MG capsule; Take 1 capsule by mouth 2 (Two) Times a Day for 10 days.      Continue Claritin.  Add Mucinex, and plenty of fluids.    Time of visit: 6 minutes    Return if symptoms worsen or fail to improve.

## 2020-04-24 DIAGNOSIS — E78.49 OTHER HYPERLIPIDEMIA: ICD-10-CM

## 2020-04-24 DIAGNOSIS — F41.1 GENERALIZED ANXIETY DISORDER: ICD-10-CM

## 2020-04-24 RX ORDER — BUPROPION HYDROCHLORIDE 150 MG/1
150 TABLET ORAL EVERY MORNING
Qty: 90 TABLET | Refills: 1 | OUTPATIENT
Start: 2020-04-24

## 2020-04-24 RX ORDER — LOSARTAN POTASSIUM AND HYDROCHLOROTHIAZIDE 12.5; 1 MG/1; MG/1
1 TABLET ORAL DAILY
Qty: 90 TABLET | Refills: 3 | OUTPATIENT
Start: 2020-04-24

## 2020-04-24 RX ORDER — ATORVASTATIN CALCIUM 10 MG/1
10 TABLET, FILM COATED ORAL DAILY
Qty: 30 TABLET | Refills: 5 | OUTPATIENT
Start: 2020-04-24

## 2020-04-24 NOTE — TELEPHONE ENCOUNTER
Pt called in requesting med refill on losartan-hydrochlorothiazide (HYZAAR) 100-12.5 MG per tablet,  buPROPion XL (WELLBUTRIN XL) 150 MG 24 hr tablet, atorvastatin (LIPITOR) 10 MG tablet to be sent to the [facility] [address] [place] [address] [address] AT [facility] & [address] - [phone]  - [phone]

## 2020-04-24 NOTE — TELEPHONE ENCOUNTER
[facility] HAS CALLED TO FOLLOW UP ON A REFILL REQUEST FOR   losartan-hydrochlorothiazide (HYZAAR) 100-12.5 MG per tablet  THEY DON'T HAVE THE REFILL REQUEST THAT WAS SENT OVER.   THEY WOULD ALSO LIKE TO INFORM YOU THAT THE COMBINATION PRESCRIPTION HAS BEEN ON BACK ORDER AND SO THEY'VE HAD TO SPLIT THE MEDICATION INTO 2 TABLETS BUT THE DOSAGE HASN'T CHANGED.     PLEASE CALL [facility] AT [facility] FOR ANY QUESTIONS OR CONCERNS AT   [phone]

## 2020-06-16 ENCOUNTER — OFFICE VISIT (OUTPATIENT)
Dept: INTERNAL MEDICINE | Facility: CLINIC | Age: 60
End: 2020-06-16

## 2020-06-16 VITALS
RESPIRATION RATE: 20 BRPM | TEMPERATURE: 97.8 F | HEART RATE: 92 BPM | SYSTOLIC BLOOD PRESSURE: 160 MMHG | HEIGHT: 64 IN | WEIGHT: 168.38 LBS | BODY MASS INDEX: 28.75 KG/M2 | DIASTOLIC BLOOD PRESSURE: 100 MMHG

## 2020-06-16 DIAGNOSIS — E55.9 VITAMIN D DEFICIENCY: ICD-10-CM

## 2020-06-16 DIAGNOSIS — E78.49 OTHER HYPERLIPIDEMIA: ICD-10-CM

## 2020-06-16 DIAGNOSIS — F41.1 GENERALIZED ANXIETY DISORDER: ICD-10-CM

## 2020-06-16 DIAGNOSIS — I10 ESSENTIAL HYPERTENSION: ICD-10-CM

## 2020-06-16 DIAGNOSIS — K21.9 GASTROESOPHAGEAL REFLUX DISEASE WITHOUT ESOPHAGITIS: ICD-10-CM

## 2020-06-16 DIAGNOSIS — Z00.00 ENCOUNTER FOR HEALTH MAINTENANCE EXAMINATION IN ADULT: Primary | ICD-10-CM

## 2020-06-16 DIAGNOSIS — E03.9 ACQUIRED HYPOTHYROIDISM: ICD-10-CM

## 2020-06-16 LAB
ALBUMIN SERPL-MCNC: 4.7 G/DL (ref 3.5–5.2)
ALBUMIN/CREATININE RATIO, URINE: NORMAL
ALBUMIN/GLOB SERPL: 1.7 G/DL
ALP SERPL-CCNC: 47 U/L (ref 39–117)
ALT SERPL W P-5'-P-CCNC: 87 U/L (ref 1–33)
ANION GAP SERPL CALCULATED.3IONS-SCNC: 13.6 MMOL/L (ref 5–15)
AST SERPL-CCNC: 108 U/L (ref 1–32)
BILIRUB SERPL-MCNC: 0.7 MG/DL (ref 0.2–1.2)
BUN BLD-MCNC: 14 MG/DL (ref 6–20)
BUN/CREAT SERPL: 16.5 (ref 7–25)
CALCIUM SPEC-SCNC: 9.3 MG/DL (ref 8.6–10.5)
CHLORIDE SERPL-SCNC: 101 MMOL/L (ref 98–107)
CHOLEST SERPL-MCNC: 213 MG/DL (ref 0–200)
CLARITY, POC: CLEAR
CO2 SERPL-SCNC: 22.4 MMOL/L (ref 22–29)
COLOR UR: YELLOW
CREAT BLD-MCNC: 0.85 MG/DL (ref 0.57–1)
CREAT UR-MCNC: 200 MG/DL (ref 0.6–1.3)
EXPIRATION DATE: ABNORMAL
GFR SERPL CREATININE-BSD FRML MDRD: 69 ML/MIN/1.73
GLOBULIN UR ELPH-MCNC: 2.7 GM/DL
GLUCOSE BLD-MCNC: 102 MG/DL (ref 65–99)
GLUCOSE UR STRIP-MCNC: NEGATIVE MG/DL
HDLC SERPL-MCNC: 65 MG/DL (ref 40–60)
KETONES UR QL: NEGATIVE
LDLC SERPL CALC-MCNC: 104 MG/DL (ref 0–100)
LDLC/HDLC SERPL: 1.6 {RATIO}
LEUKOCYTE EST, POC: NEGATIVE
Lab: 1051
NITRITE UR-MCNC: NEGATIVE MG/ML
PH UR: 6.5 [PH] (ref 5–8)
POTASSIUM BLD-SCNC: 4.2 MMOL/L (ref 3.5–5.2)
PROT SERPL-MCNC: 7.4 G/DL (ref 6–8.5)
PROT UR STRIP-MCNC: NEGATIVE MG/DL
RBC # UR STRIP: NEGATIVE /UL
SODIUM BLD-SCNC: 137 MMOL/L (ref 136–145)
SP GR UR: 1.02 (ref 1–1.03)
T4 FREE SERPL-MCNC: 1.01 NG/DL (ref 0.93–1.7)
TRIGL SERPL-MCNC: 221 MG/DL (ref 0–150)
TSH SERPL DL<=0.05 MIU/L-ACNC: 5.31 UIU/ML (ref 0.27–4.2)
VLDLC SERPL-MCNC: 44.2 MG/DL (ref 5–40)

## 2020-06-16 PROCEDURE — 80061 LIPID PANEL: CPT | Performed by: INTERNAL MEDICINE

## 2020-06-16 PROCEDURE — 84443 ASSAY THYROID STIM HORMONE: CPT | Performed by: INTERNAL MEDICINE

## 2020-06-16 PROCEDURE — 80053 COMPREHEN METABOLIC PANEL: CPT | Performed by: INTERNAL MEDICINE

## 2020-06-16 PROCEDURE — 85025 COMPLETE CBC W/AUTO DIFF WBC: CPT | Performed by: INTERNAL MEDICINE

## 2020-06-16 PROCEDURE — 84439 ASSAY OF FREE THYROXINE: CPT | Performed by: INTERNAL MEDICINE

## 2020-06-16 PROCEDURE — 81003 URINALYSIS AUTO W/O SCOPE: CPT | Performed by: INTERNAL MEDICINE

## 2020-06-16 PROCEDURE — 99396 PREV VISIT EST AGE 40-64: CPT | Performed by: INTERNAL MEDICINE

## 2020-06-16 PROCEDURE — 82306 VITAMIN D 25 HYDROXY: CPT | Performed by: INTERNAL MEDICINE

## 2020-06-16 RX ORDER — LOSARTAN POTASSIUM AND HYDROCHLOROTHIAZIDE 25; 100 MG/1; MG/1
1 TABLET ORAL DAILY
Qty: 90 TABLET | Refills: 3 | Status: SHIPPED | OUTPATIENT
Start: 2020-06-16 | End: 2021-08-06 | Stop reason: SDUPTHER

## 2020-06-16 NOTE — PATIENT INSTRUCTIONS
I recommend Shingrix (new Shingles vaccine) at the pharmacy.    Please call if you would like to have a Cardiac CT Scan scheduled (information given today).      Health Maintenance for Postmenopausal Women  Menopause is a normal process in which your ability to get pregnant comes to an end. This process happens slowly over many months or years, usually between the ages of 48 and 55. Menopause is complete when you have missed your menstrual periods for 12 months.  It is important to talk with your health care provider about some of the most common conditions that affect women after menopause (postmenopausal women). These include heart disease, cancer, and bone loss (osteoporosis). Adopting a healthy lifestyle and getting preventive care can help to promote your health and wellness. The actions you take can also lower your chances of developing some of these common conditions.  What should I know about menopause?  During menopause, you may get a number of symptoms, such as:  · Hot flashes. These can be moderate or severe.  · Night sweats.  · Decrease in sex drive.  · Mood swings.  · Headaches.  · Tiredness.  · Irritability.  · Memory problems.  · Insomnia.  Choosing to treat or not to treat these symptoms is a decision that you make with your health care provider.  Do I need hormone replacement therapy?  · Hormone replacement therapy is effective in treating symptoms that are caused by menopause, such as hot flashes and night sweats.  · Hormone replacement carries certain risks, especially as you become older. If you are thinking about using estrogen or estrogen with progestin, discuss the benefits and risks with your health care provider.  What is my risk for heart disease and stroke?  The risk of heart disease, heart attack, and stroke increases as you age. One of the causes may be a change in the body's hormones during menopause. This can affect how your body uses dietary fats, triglycerides, and cholesterol. Heart  attack and stroke are medical emergencies. There are many things that you can do to help prevent heart disease and stroke.  Watch your blood pressure  · High blood pressure causes heart disease and increases the risk of stroke. This is more likely to develop in people who have high blood pressure readings, are of  descent, or are overweight.  · Have your blood pressure checked:  ? Every 3-5 years if you are 18-39 years of age.  ? Every year if you are 40 years old or older.  Eat a healthy diet    · Eat a diet that includes plenty of vegetables, fruits, low-fat dairy products, and lean protein.  · Do not eat a lot of foods that are high in solid fats, added sugars, or sodium.  Get regular exercise  Get regular exercise. This is one of the most important things you can do for your health. Most adults should:  · Try to exercise for at least 150 minutes each week. The exercise should increase your heart rate and make you sweat (moderate-intensity exercise).  · Try to do strengthening exercises at least twice each week. Do these in addition to the moderate-intensity exercise.  · Spend less time sitting. Even light physical activity can be beneficial.  Other tips  · Work with your health care provider to achieve or maintain a healthy weight.  · Do not use any products that contain nicotine or tobacco, such as cigarettes, e-cigarettes, and chewing tobacco. If you need help quitting, ask your health care provider.  · Know your numbers. Ask your health care provider to check your cholesterol and your blood sugar (glucose). Continue to have your blood tested as directed by your health care provider.  Do I need screening for cancer?  Depending on your health history and family history, you may need to have cancer screening at different stages of your life. This may include screening for:  · Breast cancer.  · Cervical cancer.  · Lung cancer.  · Colorectal cancer.  What is my risk for osteoporosis?  After menopause, you  may be at increased risk for osteoporosis. Osteoporosis is a condition in which bone destruction happens more quickly than new bone creation. To help prevent osteoporosis or the bone fractures that can happen because of osteoporosis, you may take the following actions:  · If you are 19-50 years old, get at least 1,000 mg of calcium and at least 600 mg of vitamin D per day.  · If you are older than age 50 but younger than age 70, get at least 1,200 mg of calcium and at least 600 mg of vitamin D per day.  · If you are older than age 70, get at least 1,200 mg of calcium and at least 800 mg of vitamin D per day.  Smoking and drinking excessive alcohol increase the risk of osteoporosis. Eat foods that are rich in calcium and vitamin D, and do weight-bearing exercises several times each week as directed by your health care provider.  How does menopause affect my mental health?  Depression may occur at any age, but it is more common as you become older. Common symptoms of depression include:  · Low or sad mood.  · Changes in sleep patterns.  · Changes in appetite or eating patterns.  · Feeling an overall lack of motivation or enjoyment of activities that you previously enjoyed.  · Frequent crying spells.  Talk with your health care provider if you think that you are experiencing depression.  General instructions  See your health care provider for regular wellness exams and vaccines. This may include:  · Scheduling regular health, dental, and eye exams.  · Getting and maintaining your vaccines. These include:  ? Influenza vaccine. Get this vaccine each year before the flu season begins.  ? Pneumonia vaccine.  ? Shingles vaccine.  ? Tetanus, diphtheria, and pertussis (Tdap) booster vaccine.  Your health care provider may also recommend other immunizations.  Tell your health care provider if you have ever been abused or do not feel safe at home.  Summary  · Menopause is a normal process in which your ability to get pregnant  comes to an end.  · This condition causes hot flashes, night sweats, decreased interest in sex, mood swings, headaches, or lack of sleep.  · Treatment for this condition may include hormone replacement therapy.  · Take actions to keep yourself healthy, including exercising regularly, eating a healthy diet, watching your weight, and checking your blood pressure and blood sugar levels.  · Get screened for cancer and depression. Make sure that you are up to date with all your vaccines.  This information is not intended to replace advice given to you by your health care provider. Make sure you discuss any questions you have with your health care provider.  Document Released: 02/09/2007 Document Revised: 12/11/2019 Document Reviewed: 12/11/2019  [employer] Patient Education © 2020 [employer]      Heart-Healthy Eating Plan  Many factors influence your heart (coronary) health, including eating and exercise habits. Coronary risk increases with abnormal blood fat (lipid) levels. Heart-healthy meal planning includes limiting unhealthy fats, increasing healthy fats, and making other diet and lifestyle changes.  What is my plan?  Your health care provider may recommend that you:  · Limit your fat intake to _________% or less of your total calories each day.  · Limit your saturated fat intake to _________% or less of your total calories each day.  · Limit the amount of cholesterol in your diet to less than _________ mg per day.  What are tips for following this plan?  Cooking  Cook foods using methods other than frying. Baking, boiling, grilling, and broiling are all good options. Other ways to reduce fat include:  · Removing the skin from poultry.  · Removing all visible fats from meats.  · Steaming vegetables in water or broth.  Meal planning    · At meals, imagine dividing your plate into fourths:  ? Fill one-half of your plate with vegetables and green salads.  ? Fill one-fourth of your plate with whole grains.  ? Fill  one-fourth of your plate with lean protein foods.  · Eat 4-5 servings of vegetables per day. One serving equals 1 cup raw or cooked vegetable, or 2 cups raw leafy greens.  · Eat 4-5 servings of fruit per day. One serving equals 1 medium whole fruit, ¼ cup dried fruit, ½ cup fresh, frozen, or canned fruit, or ½ cup 100% fruit juice.  · Eat more foods that contain soluble fiber. Examples include apples, broccoli, carrots, beans, peas, and barley. Aim to get 25-30 g of fiber per day.  · Increase your consumption of legumes, nuts, and seeds to 4-5 servings per week. One serving of dried beans or legumes equals ½ cup cooked, 1 serving of nuts is ¼ cup, and 1 serving of seeds equals 1 tablespoon.  Fats  · Choose healthy fats more often. Choose monounsaturated and polyunsaturated fats, such as olive and canola oils, flaxseeds, walnuts, almonds, and seeds.  · Eat more omega-3 fats. Choose salmon, mackerel, sardines, tuna, flaxseed oil, and ground flaxseeds. Aim to eat fish at least 2 times each week.  · Check food labels carefully to identify foods with trans fats or high amounts of saturated fat.  · Limit saturated fats. These are found in animal products, such as meats, butter, and cream. Plant sources of saturated fats include palm oil, palm kernel oil, and coconut oil.  · Avoid foods with partially hydrogenated oils in them. These contain trans fats. Examples are stick margarine, some tub margarines, cookies, crackers, and other baked goods.  · Avoid fried foods.  General information  · Eat more home-cooked food and less restaurant, buffet, and fast food.  · Limit or avoid alcohol.  · Limit foods that are high in starch and sugar.  · Lose weight if you are overweight. Losing just 5-10% of your body weight can help your overall health and prevent diseases such as diabetes and heart disease.  · Monitor your salt (sodium) intake, especially if you have high blood pressure. Talk with your health care provider about your  sodium intake.  · Try to incorporate more vegetarian meals weekly.  What foods can I eat?  Fruits  All fresh, canned (in natural juice), or frozen fruits.  Vegetables  Fresh or frozen vegetables (raw, steamed, roasted, or grilled). Green salads.  Grains  Most grains. Choose whole wheat and whole grains most of the time. Rice and pasta, including brown rice and pastas made with whole wheat.  Meats and other proteins  Lean, well-trimmed beef, veal, pork, and lamb. Chicken and turkey without skin. All fish and shellfish. Wild duck, rabbit, pheasant, and venison. Egg whites or low-cholesterol egg substitutes. Dried beans, peas, lentils, and tofu. Seeds and most nuts.  Dairy  Low-fat or nonfat cheeses, including ricotta and mozzarella. Skim or 1% milk (liquid, powdered, or evaporated). Buttermilk made with low-fat milk. Nonfat or low-fat yogurt.  Fats and oils  Non-hydrogenated (trans-free) margarines. Vegetable oils, including soybean, sesame, sunflower, olive, peanut, safflower, corn, canola, and cottonseed. Salad dressings or mayonnaise made with a vegetable oil.  Beverages  Water (mineral or sparkling). Coffee and tea. Diet carbonated beverages.  Sweets and desserts  Sherbet, gelatin, and fruit ice. Small amounts of dark chocolate.  Limit all sweets and desserts.  Seasonings and condiments  All seasonings and condiments.  The items listed above may not be a complete list of foods and beverages you can eat. Contact a dietitian for more options.  What foods are not recommended?  Fruits  Canned fruit in heavy syrup. Fruit in cream or butter sauce. Fried fruit. Limit coconut.  Vegetables  Vegetables cooked in cheese, cream, or butter sauce. Fried vegetables.  Grains  Breads made with saturated or trans fats, oils, or whole milk. Croissants. Sweet rolls. Donuts. High-fat crackers, such as cheese crackers.  Meats and other proteins  Fatty meats, such as hot dogs, ribs, sausage, roche, rib-eye roast or steak. High-fat deli  meats, such as salami and bologna. Caviar. Domestic duck and goose. Organ meats, such as liver.  Dairy  Cream, sour cream, cream cheese, and creamed cottage cheese. Whole milk cheeses. Whole or 2% milk (liquid, evaporated, or condensed). Whole buttermilk. Cream sauce or high-fat cheese sauce. Whole-milk yogurt.  Fats and oils  Meat fat, or shortening. Cocoa butter, hydrogenated oils, palm oil, coconut oil, palm kernel oil. Solid fats and shortenings, including roche fat, salt pork, lard, and butter. Nondairy cream substitutes. Salad dressings with cheese or sour cream.  Beverages  Regular sodas and any drinks with added sugar.  Sweets and desserts  Frosting. Pudding. Cookies. Cakes. Pies. Milk chocolate or white chocolate. Buttered syrups. Full-fat ice cream or ice cream drinks.  The items listed above may not be a complete list of foods and beverages to avoid. Contact a dietitian for more information.  Summary  · Heart-healthy meal planning includes limiting unhealthy fats, increasing healthy fats, and making other diet and lifestyle changes.  · Lose weight if you are overweight. Losing just 5-10% of your body weight can help your overall health and prevent diseases such as diabetes and heart disease.  · Focus on eating a balance of foods, including fruits and vegetables, low-fat or nonfat dairy, lean protein, nuts and legumes, whole grains, and heart-healthy oils and fats.  This information is not intended to replace advice given to you by your health care provider. Make sure you discuss any questions you have with your health care provider.  Document Released: 09/26/2009 Document Revised: 01/25/2019 Document Reviewed: 01/25/2019  [email or web address] Patient Education © 2020 [employer]      Exercising to Lose Weight  Exercise is structured, repetitive physical activity to improve fitness and health. Getting regular exercise is important for everyone. It is especially important if you are overweight. Being overweight  increases your risk of heart disease, stroke, diabetes, high blood pressure, and several types of cancer. Reducing your calorie intake and exercising can help you lose weight.  Exercise is usually categorized as moderate or vigorous intensity. To lose weight, most people need to do a certain amount of moderate-intensity or vigorous-intensity exercise each week.  Moderate-intensity exercise    Moderate-intensity exercise is any activity that gets you moving enough to burn at least three times more energy (calories) than if you were sitting.  Examples of moderate exercise include:  · Walking a mile in 15 minutes.  · Doing light yard work.  · Biking at an easy pace.  Most people should get at least 150 minutes (2 hours and 30 minutes) a week of moderate-intensity exercise to maintain their body weight.  Vigorous-intensity exercise  Vigorous-intensity exercise is any activity that gets you moving enough to burn at least six times more calories than if you were sitting. When you exercise at this intensity, you should be working hard enough that you are not able to carry on a conversation.  Examples of vigorous exercise include:  · Running.  · Playing a team sport, such as football, basketball, and soccer.  · Jumping rope.  Most people should get at least 75 minutes (1 hour and 15 minutes) a week of vigorous-intensity exercise to maintain their body weight.  How can exercise affect me?  When you exercise enough to burn more calories than you eat, you lose weight. Exercise also reduces body fat and builds muscle. The more muscle you have, the more calories you burn. Exercise also:  · Improves mood.  · Reduces stress and tension.  · Improves your overall fitness, flexibility, and endurance.  · Increases bone strength.  The amount of exercise you need to lose weight depends on:  · Your age.  · The type of exercise.  · Any health conditions you have.  · Your overall physical ability.  Talk to your health care provider about  how much exercise you need and what types of activities are safe for you.  What actions can I take to lose weight?  Nutrition    · Make changes to your diet as told by your health care provider or diet and nutrition specialist (dietitian). This may include:  ? Eating fewer calories.  ? Eating more protein.  ? Eating less unhealthy fats.  ? Eating a diet that includes fresh fruits and vegetables, whole grains, low-fat dairy products, and lean protein.  ? Avoiding foods with added fat, salt, and sugar.  · Drink plenty of water while you exercise to prevent dehydration or heat stroke.  Activity  · Choose an activity that you enjoy and set realistic goals. Your health care provider can help you make an exercise plan that works for you.  · Exercise at a moderate or vigorous intensity most days of the week.  ? The intensity of exercise may vary from person to person. You can tell how intense a workout is for you by paying attention to your breathing and heartbeat. Most people will notice their breathing and heartbeat get faster with more intense exercise.  · Do resistance training twice each week, such as:  ? Push-ups.  ? Sit-ups.  ? Lifting weights.  ? Using resistance bands.  · Getting short amounts of exercise can be just as helpful as long structured periods of exercise. If you have trouble finding time to exercise, try to include exercise in your daily routine.  ? Get up, stretch, and walk around every 30 minutes throughout the day.  ? Go for a walk during your lunch break.  ? Park your car farther away from your destination.  ? If you take public transportation, get off one stop early and walk the rest of the way.  ? Make phone calls while standing up and walking around.  ? Take the stairs instead of elevators or escalators.  · Wear comfortable clothes and shoes with good support.  · Do not exercise so much that you hurt yourself, feel dizzy, or get very short of breath.  Where to find more information  · [place]  Department of Health and Human Services: www.hhs.gov  · Centers for Disease Control and Prevention (CDC): www.cdc.gov  Contact a health care provider:  · Before starting a new exercise program.  · If you have questions or concerns about your weight.  · If you have a medical problem that keeps you from exercising.  Get help right away if you have any of the following while exercising:  · Injury.  · Dizziness.  · Difficulty breathing or shortness of breath that does not go away when you stop exercising.  · Chest pain.  · Rapid heartbeat.  Summary  · Being overweight increases your risk of heart disease, stroke, diabetes, high blood pressure, and several types of cancer.  · Losing weight happens when you burn more calories than you eat.  · Reducing the amount of calories you eat in addition to getting regular moderate or vigorous exercise each week helps you lose weight.  This information is not intended to replace advice given to you by your health care provider. Make sure you discuss any questions you have with your health care provider.  Document Released: 01/20/2012 Document Revised: 12/31/2018 Document Reviewed: 12/31/2018  [employer] Patient Education © 2020 [employer]

## 2020-06-16 NOTE — PROGRESS NOTES
Subjective     Chief Complaint:  Physical Exam.    History of Present Illness    History obtained from the patient.    Primary Care Cardiac Diagnostic Constellation: The patient is here today for a follow-up visit.       Her Hypertension has been unstable.   Medication(s): Losartan HCTZ.   Her Hyperlipidemia has been stable.   Her LDL goal is < 130 mg/dL, last LDL was 122 mg/dL, .   Medication(s): Atorvastatin  The patient is mostly adherent with her medication regimen. She denies medication side effects.       Interval Events:  Blood pressure at home has been 140-150 / 80-90.  She did not take her blood pressure medication today     Symptoms:  Denies chest pain, dyspnea, PORTILLO, orthopnea, PND, palpitations,  syncope, lower extremity edema, intermittent leg claudication, lightheadedness, and dizziness.  Associated Symptoms:  No significant weight change since last visit. Stable fatigue.  No headache, polydipsia, polyuria, myalgias, arthralgias, memory loss, concentration problems, or focal neurologic deficits.      Lifestyle and Disease Management: Diet: She consumes a diverse and healthy diet, overall.  Weight Issues: She has weight concerns. Exercise: She exercises walks 4 times per week  and gardens 2-3 times per week.    Tobacco Use: Never a Smoker.      Hypothyroidism Follow-Up: The patient is being seen for follow-up of Hypothyroidism, which is stable.   Interval events: None.   Symptoms:   No significant weight change since last visit. Stable fatigue. Denies cold intolerance, constipation, memory loss, trouble concentrating, hair loss, and dry skin.   Associated Symptoms: no myalgias, arthralgias, or paresthesias.   Medications:  Levothyroxine (Synthroid).   The patient is adherent to her medication regimen,  and she denies medication side effects.       Gastroesophageal Reflux Disease Follow-up: The patient is being seen for a routine clinic follow-up of Gastroesophageal Reflux Disease, which  is stable.  Procedures:  EGD 12/21/15 (small hiatal hernia, mild duodenitis, and concentric espohageal rings suggestive of esophsgitis- path c/w mild chronic esophagitis w/ increased eosinophils suggestive of reflux (Gonsalo).   Interval Events:  None.  Symptoms:  No abdominal pain, heartburn, acid regurgitation, nausea, vomiting, dysphagia, odynophagia, hematemesis, hematochezia, melena, early satiety, belching, and bloating.  Associated Symptoms:  no chronic sore throat, hoarseness, cough, or wheezing.   Medication: Tums prn.     Vitamin D Deficiency Follow-Up: The patient is being seen for follow-up of Vitamin D Deficiency, which is stable.    Interval Events:  Recent laboratory results: date 10/18/19, 25-hydroxyvitamin D 10.2 ng/mL.   Symptoms: Stable fatigue.   No fatigue, myalgias, arthralgias, paresthesias, balance issues, or gait abnormality.  Medication:  Vitamin D3 (cholecalciferol) 2000 IU daily. S/p Vitamin D2 x 4 months.     Anxiety Disorder Follow-Up: The patient is being seen for follow-up of Anxiety, which is stable.  Comorbid Illnesses None.   Interval Events:  None.   Symptoms: worsened anxiety.  Has had some panic attacks past few months.  Denies depression,  insomnia, memory loss, and difficulty concentrating.  Associated Symptoms: no suicidal ideation.   Medication(s): Wellbutrin XL.   The patient is adherent to her medication regimen, and she denies medication side effects.       Roz Randolph is a 59 y.o. female who presents for an Annual Physical.      PMH, PSH, SocHx, FamHx, Allergies, and Medications: Reviewed and updated.    Outpatient Medications Prior to Visit   Medication Sig Dispense Refill   • atorvastatin (LIPITOR) 10 MG tablet Take 1 tablet by mouth Daily. 30 tablet 5   • buPROPion XL (WELLBUTRIN XL) 150 MG 24 hr tablet Take 1 tablet by mouth Every Morning. 90 tablet 1   • Cholecalciferol (VITAMIN D3) 25 MCG (1000 UT) capsule Take  by mouth.     • levothyroxine (SYNTHROID,  LEVOTHROID) 75 MCG tablet Take 1 tablet by mouth Daily. 90 tablet 1   • losartan-hydrochlorothiazide (HYZAAR) 100-12.5 MG per tablet Take 1 tablet by mouth Daily. 90 tablet 3   • vitamin D (ERGOCALCIFEROL) 1.25 MG (95855 UT) capsule capsule Take 1 capsule by mouth 1 (One) Time Per Week. 4 capsule 3     No facility-administered medications prior to visit.        Immunization History   Administered Date(s) Administered   • FLUARIX/FLUZONE/AFLURIA/FLULAVAL QUAD 10/18/2019   • Flu Vaccine Quad PF >36MO 01/03/2018   • Hepatitis A 04/15/2019, 10/18/2019   • PPD Test 02/08/2019   • Tdap 03/23/2011         Patient Active Problem List   Diagnosis   • Anxiety disorder   • Diverticulosis of large intestine   • Abnormal liver enzymes   • Gastroesophageal reflux disease   • Hyperlipidemia   • Hypertension   • Hypothyroidism   • Lung nodule   • Sigmoid diverticulitis   • Macrocytosis   • Vitamin D deficiency       Health Habits:  Dental Exam. up to date  Eye Exam. up to date  Hearing Loss:  No  Exercise: 4 times/week.  Current exercise activities include: gardening and walking  Diet: Healthy overall  Multivitamin: No     Safe Driving:  Yes  Seat Belt:  Yes  Bike Helmet:  N/A  Skin Screening:  Yes  Sunscreen: Yes  SBE / MATIAS: Yes  Sexual Activity:  Yes  Birth Control:  MO/BSO  STD Prevention:  N/A    Last Pap: MO/BSO  Last Mammogram:  12/16/19, cat 1  Last DEXA Scan: 11/27/18, normal  Last Colonoscopy: 4/12/18, sigmoid diverticular disease  Last PSA: N/A    Social:    Social History     Socioeconomic History   • Marital status:      Spouse name: Not on file   • Number of children: 2   • Years of education: Not on file   • Highest education level: Not on file   Occupational History   • Occupation:      Comment: full time   Tobacco Use   • Smoking status: Never Smoker   • Smokeless tobacco: Never Used   Substance and Sexual Activity   • Alcohol use: Yes     Comment: 2-3 glasses of wine weekends   • Drug  use: No   • Sexual activity: Yes     Partners: Male     Birth control/protection: Surgical         Current Medical Providers:    Maia Melton MD (Internal Medicine / Pediatrics)    The [facility] providers who are involved in the care of this patient are listed above.         Review of Systems   Constitutional: Positive for fatigue. Negative for chills, fever and unexpected weight change.        No night sweats.    HENT: Negative for congestion, ear pain, hearing loss, nosebleeds, postnasal drip, rhinorrhea, sinus pressure, sinus pain, sneezing, sore throat, tinnitus and voice change.         Denies snoring.   Eyes: Positive for itching (improved). Negative for photophobia, pain, discharge, redness and visual disturbance.   Respiratory: Positive for cough (dry, chronic). Negative for chest tightness, shortness of breath, wheezing and stridor.         No chest congestion.  No hemoptysis.   Cardiovascular: Negative for chest pain, palpitations and leg swelling.        No orthopnea, PORTILLO, or PND.  No claudication or syncope.   Gastrointestinal: Negative for abdominal pain, blood in stool, constipation, diarrhea, nausea, rectal pain and vomiting.        No melena.  No hematemesis.  No heartburn, dysphagia or odynophagia.  No early satiety, belching, or bloating.    Endocrine: Negative for cold intolerance, heat intolerance, polydipsia, polyphagia and polyuria.        No hair loss or dry skin.  No hot flashes.     Genitourinary: Negative for difficulty urinating, dyspareunia, dysuria, flank pain, frequency, hematuria, pelvic pain, urgency, vaginal bleeding and vaginal discharge.        No nocturia, incomplete emptying, or incontinence.   Musculoskeletal: Negative for arthralgias, back pain, gait problem, joint swelling, myalgias, neck pain and neck stiffness.        No joint stiffness.   Skin: Negative for rash.        No new skin lesions or changes in skin lesions. No breast pain or masses.  No nipple discharge or  "nipple inversion.   Allergic/Immunologic: Positive for environmental allergies.   Neurological: Negative for dizziness, tremors, syncope, speech difficulty, weakness, light-headedness, numbness and headaches.        No tingling.  No memory loss.  No decreased concentration.   Hematological: Negative for adenopathy. Does not bruise/bleed easily.   Psychiatric/Behavioral: Negative for confusion, sleep disturbance and suicidal ideas. The patient is nervous/anxious.         No depression.           Objective     Vitals:    06/16/20 1042   BP: 160/100   Pulse: 92   Resp: 20   Temp: 97.8 °F (36.6 °C)   TempSrc: Temporal   Weight: 76.4 kg (168 lb 6 oz)   Height: 162.6 cm (64\")       Body mass index is 28.9 kg/m².    Physical Exam   Constitutional:   Overweight.   HENT:   Head: Normocephalic and atraumatic.   Right Ear: Tympanic membrane, external ear and ear canal normal.   Left Ear: Tympanic membrane, external ear and ear canal normal.   Mouth/Throat: Oropharynx is clear and moist. No oral lesions.   Tonsils normal.   Eyes: Pupils are equal, round, and reactive to light. Conjunctivae and EOM are normal.   Neck: Normal range of motion. Neck supple. Carotid bruit is not present. No thyroid mass and no thyromegaly present.   Cardiovascular: Normal rate, regular rhythm, normal heart sounds and intact distal pulses. Exam reveals no gallop and no friction rub.   No murmur heard.  No peripheral edema.   Pulmonary/Chest: Effort normal and breath sounds normal. Right breast exhibits no inverted nipple, no mass, no nipple discharge, no skin change and no tenderness. Left breast exhibits no inverted nipple, no mass, no nipple discharge, no skin change and no tenderness.   Abdominal: Soft. Bowel sounds are normal. She exhibits no distension, no abdominal bruit and no mass. There is no hepatosplenomegaly. There is no tenderness.   Genitourinary:   Genitourinary Comments:  and rectal exam deferred.   Musculoskeletal: Normal range of " motion.   Lymphadenopathy:     She has no cervical adenopathy.     She has no axillary adenopathy. No inguinal adenopathy noted on the right or left side.        Right: No inguinal and no supraclavicular adenopathy present.        Left: No inguinal and no supraclavicular adenopathy present.   Neurological: She is alert. She has normal strength and normal reflexes. No cranial nerve deficit. Coordination and gait normal.   Skin: No lesion and no rash noted.   No atypical skin lesions.   Psychiatric: She has a normal mood and affect.   Nursing note and vitals reviewed.      PHQ-2 Depression Screening  Little interest or pleasure in doing things? 0   Feeling down, depressed, or hopeless? 0   PHQ-2 Total Score 0         Counseling was given to patient for the following topics:  appropriate exercise, healthy eating habits, disease prevention, risk factors for cancer, importance of self breast exam and breast health, importance of immunizations, including risks and benefits, sun safety, seatbelt use and safe driving. Also discussed the importance of regular dental and vision care, as well recommendation for a yearly screening skin exam after age 40.  Written information provided to patient on these topics and other health maintenance issues.      Assessment/Plan       Roz was seen today for annual exam.    Diagnoses and all orders for this visit:    Encounter for health maintenance examination in adult  -     Lipid Panel  -     Comprehensive Metabolic Panel  -     TSH  -     Vitamin D 25 Hydroxy  -     CBC & Differential  -     T4, Free  -     POC Urinalysis Dipstick, Multipro  -     CBC Auto Differential    Essential hypertension  -     Lipid Panel  -     Comprehensive Metabolic Panel  -     TSH  -     CBC & Differential  -     POC Urinalysis Dipstick, Multipro  -     losartan-hydrochlorothiazide (Hyzaar) 100-25 MG per tablet; Take 1 tablet by mouth Daily.  -     CBC Auto Differential    Other hyperlipidemia  -     Lipid  Panel  -     Comprehensive Metabolic Panel  -     TSH  -     CBC & Differential  -     CBC Auto Differential   Continue current medication(s) as noted in the history of present illness.    Acquired hypothyroidism  -     TSH  -     T4, Free   Continue current medication(s) as noted in the history of present illness.    Gastroesophageal reflux disease without esophagitis   Stable, no medication.    Vitamin D deficiency  -     Vitamin D 25 Hydroxy   Continue Vitamin D supplementation.    Generalized anxiety disorder   Continue current medication(s) as noted in the history of present illness.      Recommended Shingrix (new Shingles vaccine) at the pharmacy.    The patient agrees to call if he  would like to have a Cardiac CT Scan scheduled (information given today).      Return in about 6 months (around 12/16/2020) for Recheck HTN, fasting.

## 2020-06-17 LAB
25(OH)D3 SERPL-MCNC: 29.3 NG/ML (ref 30–100)
BASOPHILS # BLD AUTO: 0.06 10*3/MM3 (ref 0–0.2)
BASOPHILS NFR BLD AUTO: 0.8 % (ref 0–1.5)
DEPRECATED RDW RBC AUTO: 43.4 FL (ref 37–54)
EOSINOPHIL # BLD AUTO: 0.36 10*3/MM3 (ref 0–0.4)
EOSINOPHIL NFR BLD AUTO: 4.9 % (ref 0.3–6.2)
ERYTHROCYTE [DISTWIDTH] IN BLOOD BY AUTOMATED COUNT: 11.8 % (ref 12.3–15.4)
HCT VFR BLD AUTO: 40.9 % (ref 34–46.6)
HGB BLD-MCNC: 14.3 G/DL (ref 12–15.9)
IMM GRANULOCYTES # BLD AUTO: 0.04 10*3/MM3 (ref 0–0.05)
IMM GRANULOCYTES NFR BLD AUTO: 0.5 % (ref 0–0.5)
LYMPHOCYTES # BLD AUTO: 1.57 10*3/MM3 (ref 0.7–3.1)
LYMPHOCYTES NFR BLD AUTO: 21.3 % (ref 19.6–45.3)
MCH RBC QN AUTO: 35 PG (ref 26.6–33)
MCHC RBC AUTO-ENTMCNC: 35 G/DL (ref 31.5–35.7)
MCV RBC AUTO: 100.2 FL (ref 79–97)
MONOCYTES # BLD AUTO: 0.62 10*3/MM3 (ref 0.1–0.9)
MONOCYTES NFR BLD AUTO: 8.4 % (ref 5–12)
NEUTROPHILS # BLD AUTO: 4.73 10*3/MM3 (ref 1.7–7)
NEUTROPHILS NFR BLD AUTO: 64.1 % (ref 42.7–76)
NRBC BLD AUTO-RTO: 0 /100 WBC (ref 0–0.2)
PLATELET # BLD AUTO: 235 10*3/MM3 (ref 140–450)
PMV BLD AUTO: 10.9 FL (ref 6–12)
RBC # BLD AUTO: 4.08 10*6/MM3 (ref 3.77–5.28)
WBC NRBC COR # BLD: 7.38 10*3/MM3 (ref 3.4–10.8)

## 2020-06-18 ENCOUNTER — TELEPHONE (OUTPATIENT)
Dept: INTERNAL MEDICINE | Facility: CLINIC | Age: 60
End: 2020-06-18

## 2020-06-18 NOTE — TELEPHONE ENCOUNTER
Travis called and states patient was seen in clinic yesterday, while here her BP was high. Said she gets extremely worked up over coming to the doctor and he thinks that affected her BP yesterday. Since being back home and taking her previous dosage of losartan-hydrochlorothiazide (Hyzaar), her BP hasn't been elevated. He wants to know if its sake for her to start the increased dosage? [phone]

## 2020-06-19 NOTE — TELEPHONE ENCOUNTER
FORTINO    Spoke with Travis and he said that Roz Martines  had gone up so she was just going to stay with the dose that Dr Melton gave her at her office visit this week.

## 2020-06-23 DIAGNOSIS — E03.9 ACQUIRED HYPOTHYROIDISM: Primary | ICD-10-CM

## 2020-06-23 DIAGNOSIS — E03.9 ACQUIRED HYPOTHYROIDISM: ICD-10-CM

## 2020-06-23 DIAGNOSIS — R74.8 ABNORMAL LIVER ENZYMES: ICD-10-CM

## 2020-06-23 RX ORDER — LEVOTHYROXINE SODIUM 88 UG/1
88 TABLET ORAL DAILY
Qty: 30 TABLET | Refills: 5 | Status: SHIPPED | OUTPATIENT
Start: 2020-06-23 | End: 2021-01-07

## 2020-07-09 ENCOUNTER — DOCUMENTATION (OUTPATIENT)
Dept: INTERNAL MEDICINE | Facility: CLINIC | Age: 60
End: 2020-07-09

## 2020-07-09 RX ORDER — SULFAMETHOXAZOLE AND TRIMETHOPRIM 800; 160 MG/1; MG/1
1 TABLET ORAL 2 TIMES DAILY
Qty: 20 TABLET | Refills: 0 | Status: SHIPPED | OUTPATIENT
Start: 2020-07-09 | End: 2020-11-19

## 2020-08-10 DIAGNOSIS — F41.1 GENERALIZED ANXIETY DISORDER: ICD-10-CM

## 2020-08-10 RX ORDER — BUPROPION HYDROCHLORIDE 150 MG/1
150 TABLET ORAL EVERY MORNING
Qty: 90 TABLET | Refills: 1 | Status: SHIPPED | OUTPATIENT
Start: 2020-08-10 | End: 2020-11-19 | Stop reason: SDUPTHER

## 2020-11-19 ENCOUNTER — OFFICE VISIT (OUTPATIENT)
Dept: INTERNAL MEDICINE | Facility: CLINIC | Age: 60
End: 2020-11-19

## 2020-11-19 VITALS
DIASTOLIC BLOOD PRESSURE: 98 MMHG | TEMPERATURE: 96.9 F | RESPIRATION RATE: 20 BRPM | BODY MASS INDEX: 28.41 KG/M2 | WEIGHT: 165.5 LBS | HEART RATE: 72 BPM | SYSTOLIC BLOOD PRESSURE: 162 MMHG

## 2020-11-19 DIAGNOSIS — K21.9 GASTROESOPHAGEAL REFLUX DISEASE, UNSPECIFIED WHETHER ESOPHAGITIS PRESENT: ICD-10-CM

## 2020-11-19 DIAGNOSIS — I10 ESSENTIAL HYPERTENSION: Primary | ICD-10-CM

## 2020-11-19 DIAGNOSIS — E03.9 ACQUIRED HYPOTHYROIDISM: ICD-10-CM

## 2020-11-19 DIAGNOSIS — R74.8 ABNORMAL LIVER ENZYMES: ICD-10-CM

## 2020-11-19 DIAGNOSIS — Z23 NEED FOR IMMUNIZATION AGAINST INFLUENZA: ICD-10-CM

## 2020-11-19 DIAGNOSIS — E55.9 VITAMIN D DEFICIENCY: ICD-10-CM

## 2020-11-19 DIAGNOSIS — F41.1 GENERALIZED ANXIETY DISORDER: ICD-10-CM

## 2020-11-19 DIAGNOSIS — E78.49 OTHER HYPERLIPIDEMIA: ICD-10-CM

## 2020-11-19 DIAGNOSIS — Z12.31 ENCOUNTER FOR SCREENING MAMMOGRAM FOR BREAST CANCER: ICD-10-CM

## 2020-11-19 LAB
ALBUMIN SERPL-MCNC: 4.7 G/DL (ref 3.5–5.2)
ALP SERPL-CCNC: 51 U/L (ref 39–117)
ALPHA1 GLOB MFR UR ELPH: 113 MG/DL (ref 90–200)
ALT SERPL W P-5'-P-CCNC: 80 U/L (ref 1–33)
ANION GAP SERPL CALCULATED.3IONS-SCNC: 11.1 MMOL/L (ref 5–15)
AST SERPL-CCNC: 98 U/L (ref 1–32)
BASOPHILS # BLD AUTO: 0.05 10*3/MM3 (ref 0–0.2)
BASOPHILS NFR BLD AUTO: 0.7 % (ref 0–1.5)
BILIRUB CONJ SERPL-MCNC: 0.2 MG/DL (ref 0–0.3)
BILIRUB INDIRECT SERPL-MCNC: 0.6 MG/DL
BILIRUB SERPL-MCNC: 0.8 MG/DL (ref 0–1.2)
BUN SERPL-MCNC: 13 MG/DL (ref 6–20)
BUN/CREAT SERPL: 17.1 (ref 7–25)
CALCIUM SPEC-SCNC: 9.6 MG/DL (ref 8.6–10.5)
CERULOPLASMIN SERPL-MCNC: 24 MG/DL (ref 19–39)
CHLORIDE SERPL-SCNC: 102 MMOL/L (ref 98–107)
CHOLEST SERPL-MCNC: 269 MG/DL (ref 0–200)
CO2 SERPL-SCNC: 26.9 MMOL/L (ref 22–29)
CREAT SERPL-MCNC: 0.76 MG/DL (ref 0.57–1)
DEPRECATED RDW RBC AUTO: 41 FL (ref 37–54)
EOSINOPHIL # BLD AUTO: 0.36 10*3/MM3 (ref 0–0.4)
EOSINOPHIL NFR BLD AUTO: 5 % (ref 0.3–6.2)
ERYTHROCYTE [DISTWIDTH] IN BLOOD BY AUTOMATED COUNT: 11.4 % (ref 12.3–15.4)
ERYTHROCYTE [SEDIMENTATION RATE] IN BLOOD: 7 MM/HR (ref 0–30)
FERRITIN SERPL-MCNC: 778 NG/ML (ref 13–150)
GFR SERPL CREATININE-BSD FRML MDRD: 78 ML/MIN/1.73
GGT SERPL-CCNC: 249 U/L (ref 5–36)
GLUCOSE SERPL-MCNC: 98 MG/DL (ref 65–99)
HAV IGM SERPL QL IA: NORMAL
HBV CORE IGM SERPL QL IA: NORMAL
HBV SURFACE AG SERPL QL IA: NORMAL
HCT VFR BLD AUTO: 40.5 % (ref 34–46.6)
HCV AB SER DONR QL: NORMAL
HDLC SERPL-MCNC: 79 MG/DL (ref 40–60)
HGB BLD-MCNC: 14.6 G/DL (ref 12–15.9)
IMM GRANULOCYTES # BLD AUTO: 0.03 10*3/MM3 (ref 0–0.05)
IMM GRANULOCYTES NFR BLD AUTO: 0.4 % (ref 0–0.5)
IRON 24H UR-MRATE: 125 MCG/DL (ref 37–145)
IRON SATN MFR SERPL: 28 % (ref 20–50)
LDLC SERPL CALC-MCNC: 170 MG/DL (ref 0–100)
LDLC/HDLC SERPL: 2.11 {RATIO}
LYMPHOCYTES # BLD AUTO: 1.88 10*3/MM3 (ref 0.7–3.1)
LYMPHOCYTES NFR BLD AUTO: 26.2 % (ref 19.6–45.3)
MCH RBC QN AUTO: 35.4 PG (ref 26.6–33)
MCHC RBC AUTO-ENTMCNC: 36 G/DL (ref 31.5–35.7)
MCV RBC AUTO: 98.1 FL (ref 79–97)
MONOCYTES # BLD AUTO: 0.62 10*3/MM3 (ref 0.1–0.9)
MONOCYTES NFR BLD AUTO: 8.6 % (ref 5–12)
NEUTROPHILS NFR BLD AUTO: 4.23 10*3/MM3 (ref 1.7–7)
NEUTROPHILS NFR BLD AUTO: 59.1 % (ref 42.7–76)
NRBC BLD AUTO-RTO: 0 /100 WBC (ref 0–0.2)
PLATELET # BLD AUTO: 241 10*3/MM3 (ref 140–450)
PMV BLD AUTO: 11 FL (ref 6–12)
POTASSIUM SERPL-SCNC: 4.5 MMOL/L (ref 3.5–5.2)
PROT SERPL-MCNC: 7.9 G/DL (ref 6–8.5)
RBC # BLD AUTO: 4.13 10*6/MM3 (ref 3.77–5.28)
SODIUM SERPL-SCNC: 140 MMOL/L (ref 136–145)
T4 FREE SERPL-MCNC: 1.27 NG/DL (ref 0.93–1.7)
TIBC SERPL-MCNC: 453 MCG/DL (ref 298–536)
TRANSFERRIN SERPL-MCNC: 304 MG/DL (ref 200–360)
TRIGL SERPL-MCNC: 117 MG/DL (ref 0–150)
TSH SERPL DL<=0.05 MIU/L-ACNC: 2.71 UIU/ML (ref 0.27–4.2)
VLDLC SERPL-MCNC: 20 MG/DL (ref 5–40)
WBC # BLD AUTO: 7.17 10*3/MM3 (ref 3.4–10.8)

## 2020-11-19 PROCEDURE — 84165 PROTEIN E-PHORESIS SERUM: CPT | Performed by: INTERNAL MEDICINE

## 2020-11-19 PROCEDURE — 82728 ASSAY OF FERRITIN: CPT | Performed by: INTERNAL MEDICINE

## 2020-11-19 PROCEDURE — 84439 ASSAY OF FREE THYROXINE: CPT | Performed by: INTERNAL MEDICINE

## 2020-11-19 PROCEDURE — 83516 IMMUNOASSAY NONANTIBODY: CPT | Performed by: INTERNAL MEDICINE

## 2020-11-19 PROCEDURE — 36415 COLL VENOUS BLD VENIPUNCTURE: CPT | Performed by: INTERNAL MEDICINE

## 2020-11-19 PROCEDURE — 86334 IMMUNOFIX E-PHORESIS SERUM: CPT | Performed by: INTERNAL MEDICINE

## 2020-11-19 PROCEDURE — 80061 LIPID PANEL: CPT | Performed by: INTERNAL MEDICINE

## 2020-11-19 PROCEDURE — 99214 OFFICE O/P EST MOD 30 MIN: CPT | Performed by: INTERNAL MEDICINE

## 2020-11-19 PROCEDURE — 82103 ALPHA-1-ANTITRYPSIN TOTAL: CPT | Performed by: INTERNAL MEDICINE

## 2020-11-19 PROCEDURE — 84466 ASSAY OF TRANSFERRIN: CPT | Performed by: INTERNAL MEDICINE

## 2020-11-19 PROCEDURE — 82977 ASSAY OF GGT: CPT | Performed by: INTERNAL MEDICINE

## 2020-11-19 PROCEDURE — 85025 COMPLETE CBC W/AUTO DIFF WBC: CPT | Performed by: INTERNAL MEDICINE

## 2020-11-19 PROCEDURE — 82390 ASSAY OF CERULOPLASMIN: CPT | Performed by: INTERNAL MEDICINE

## 2020-11-19 PROCEDURE — 80074 ACUTE HEPATITIS PANEL: CPT | Performed by: INTERNAL MEDICINE

## 2020-11-19 PROCEDURE — 84155 ASSAY OF PROTEIN SERUM: CPT | Performed by: INTERNAL MEDICINE

## 2020-11-19 PROCEDURE — 86038 ANTINUCLEAR ANTIBODIES: CPT | Performed by: INTERNAL MEDICINE

## 2020-11-19 PROCEDURE — 85652 RBC SED RATE AUTOMATED: CPT | Performed by: INTERNAL MEDICINE

## 2020-11-19 PROCEDURE — 90686 IIV4 VACC NO PRSV 0.5 ML IM: CPT | Performed by: INTERNAL MEDICINE

## 2020-11-19 PROCEDURE — 90471 IMMUNIZATION ADMIN: CPT | Performed by: INTERNAL MEDICINE

## 2020-11-19 PROCEDURE — 80076 HEPATIC FUNCTION PANEL: CPT | Performed by: INTERNAL MEDICINE

## 2020-11-19 PROCEDURE — 83540 ASSAY OF IRON: CPT | Performed by: INTERNAL MEDICINE

## 2020-11-19 PROCEDURE — 84443 ASSAY THYROID STIM HORMONE: CPT | Performed by: INTERNAL MEDICINE

## 2020-11-19 PROCEDURE — 80048 BASIC METABOLIC PNL TOTAL CA: CPT | Performed by: INTERNAL MEDICINE

## 2020-11-19 PROCEDURE — 82784 ASSAY IGA/IGD/IGG/IGM EACH: CPT | Performed by: INTERNAL MEDICINE

## 2020-11-19 RX ORDER — ATORVASTATIN CALCIUM 10 MG/1
10 TABLET, FILM COATED ORAL DAILY
Qty: 30 TABLET | Refills: 5 | Status: SHIPPED | OUTPATIENT
Start: 2020-11-19 | End: 2021-08-18 | Stop reason: SDUPTHER

## 2020-11-19 RX ORDER — BUPROPION HYDROCHLORIDE 150 MG/1
150 TABLET ORAL EVERY MORNING
Qty: 90 TABLET | Refills: 1 | Status: SHIPPED | OUTPATIENT
Start: 2020-11-19 | End: 2021-08-06 | Stop reason: SDUPTHER

## 2020-11-19 NOTE — PROGRESS NOTES
Subjective       Roz Randolph is a 60 y.o. female.     Chief Complaint   Patient presents with   • Hypertension     fasting    • Hyperlipidemia       History obtained from the patient.      History of Present Illness     Primary Care Cardiac Diagnostic Constellation: The patient is here today for a follow-up visit.       Her Hypertension has been stable.   Medication(s): Losartan HCTZ.   Her Hyperlipidemia has been unstable.   Her LDL goal is < 130 mg/dL, last LDL was 104 mg/dL, .   Medication(s): Atorvastatin  The patient is mostly adherent with her medication regimen.  She takes her Atorvastatin approximately 5 times per week.  She denies medication side effects.       Interval Events: The patient states she ran out of her Atorvastatin 2 weeks ago.  The patient states her blood pressure at home has been 120's / 80's.  She gets nervous at the doctor's office.     Symptoms:  Denies chest pain, dyspnea, PORTILLO, orthopnea, PND, palpitations, syncope, lower extremity edema, intermittent leg claudication, lightheadedness, and dizziness.  Associated Symptoms: Weight decreased 3 pounds since last visit. Stable fatigue.  No headache, polydipsia, polyuria, myalgias, arthralgias, memory loss, concentration problems, or focal neurologic deficits.      Lifestyle and Disease Management: Diet: She consumes a diverse and healthy diet, overall.  Weight Issues: She has weight concerns. Exercise: She walks 20 minutes daily and is active at work.    Tobacco Use: Never a Smoker.      Hypothyroidism Follow-Up: The patient is being seen for follow-up of Hypothyroidism, which is stable.   Interval Events: On 6/16/2020, TSH was elevated (5.31) with a normal T4 (1.01).  Levothyroxine was increased to 88 mcg daily.  The patient did not return for follow-up thyroid labs.  Symptoms:    Weight decreased 3 pounds since last visit. Stable fatigue. Denies heat / cold intolerance, constipation, memory loss, trouble concentrating, hair  loss, and dry skin.   Associated Symptoms: no myalgias, arthralgias, or paresthesias.   Medications:  Levothyroxine (Synthroid).   The patient is adherent to her medication regimen,  and she denies medication side effects.       GERD Follow-up: The patient is being seen for a routine clinic follow-up of Gastroesophageal Reflux Disease, which is stable.  Procedures:  EGD 12/21/15 (small hiatal hernia, mild duodenitis, and concentric espohageal rings suggestive of esophsgitis- path c/w mild chronic esophagitis w/ increased eosinophils suggestive of reflux (Gonsalo).   Interval Events: On 6/16/2020, liver enzymes were elevated ( and ALT 87).  Elevated liver panel was ordered, but the patient did not come back in.  Symptoms:  No abdominal pain, heartburn, acid regurgitation, nausea, vomiting, dysphagia, odynophagia, hematemesis, hematochezia, melena, early satiety, belching, and bloating.  Associated Symptoms:  no chronic sore throat, hoarseness, cough, or wheezing.   Medication: Tums prn.      Vitamin D Deficiency Follow-Up: The patient is being seen for follow-up of Vitamin D Deficiency, which is stable.    Interval Events: Vitamin D level on 6/16/2020 was 29.3..   Symptoms: Stable fatigue.  No  myalgias, arthralgias, paresthesias, balance issues, or gait abnormality.  Medication:  Vitamin D3 (cholecalciferol) 2000 IU daily.      Anxiety Disorder Follow-Up: The patient is being seen for follow-up of Anxiety, which is stable.  Comorbid Illnesses None.   Interval Events:  None.   Symptoms: Stable anxiety.  Denies depression, panic attacks, insomnia, anhedonia, memory loss, and difficulty concentrating.  Associated Symptoms: no suicidal ideation.   Medication(s): Wellbutrin XL.   The patient is adherent to her medication regimen, and she denies medication side effects.     Current Outpatient Medications on File Prior to Visit   Medication Sig Dispense Refill   • Cholecalciferol (VITAMIN D3) 25 MCG (1000 UT)  capsule Take  by mouth.     • levothyroxine (SYNTHROID, LEVOTHROID) 88 MCG tablet Take 1 tablet by mouth Daily. 30 tablet 5   • losartan-hydrochlorothiazide (Hyzaar) 100-25 MG per tablet Take 1 tablet by mouth Daily. 90 tablet 3   • [DISCONTINUED] buPROPion XL (WELLBUTRIN XL) 150 MG 24 hr tablet Take 1 tablet by mouth Every Morning. 90 tablet 1   • [DISCONTINUED] atorvastatin (LIPITOR) 10 MG tablet Take 1 tablet by mouth Daily. 30 tablet 5   • [DISCONTINUED] sulfamethoxazole-trimethoprim (Bactrim DS) 800-160 MG per tablet Take 1 tablet by mouth 2 (Two) Times a Day. 20 tablet 0     No current facility-administered medications on file prior to visit.        Current outpatient and discharge medications have been reconciled for the patient.  Reviewed by: Maia Melton MD        The following portions of the patient's history were reviewed and updated as appropriate: allergies, current medications, past family history, past medical history, past social history, past surgical history and problem list.    Review of Systems   Constitutional: Positive for fatigue. Negative for unexpected weight change.   Eyes: Negative for visual disturbance.   Respiratory: Negative for cough, shortness of breath and wheezing.    Cardiovascular: Negative for chest pain, palpitations and leg swelling.        No PORTILLO, orthopnea, or claudication.   Gastrointestinal: Negative for abdominal pain, blood in stool, constipation, diarrhea, nausea and vomiting.        Denies melena.   Endocrine: Negative for polydipsia and polyuria.   Musculoskeletal: Negative for arthralgias and myalgias.   Neurological: Negative for dizziness, syncope, light-headedness and headaches.        No memory issues.   Psychiatric/Behavioral: Negative for decreased concentration, sleep disturbance and suicidal ideas. The patient is nervous/anxious.          Objective       Blood pressure 162/98, pulse 72, temperature 96.9 °F (36.1 °C), temperature source Temporal, resp. rate  20, weight 75.1 kg (165 lb 8 oz).      Physical Exam  Vitals signs and nursing note reviewed.   Constitutional:       Appearance: She is well-developed.      Comments: Overweight.   Neck:      Thyroid: No thyroid mass or thyromegaly.      Vascular: No carotid bruit.   Cardiovascular:      Rate and Rhythm: Normal rate and regular rhythm.      Pulses: Normal pulses.      Heart sounds: Normal heart sounds. No murmur. No friction rub. No gallop.    Pulmonary:      Effort: Pulmonary effort is normal.      Breath sounds: Normal breath sounds.   Musculoskeletal:      Right lower leg: No edema.      Left lower leg: No edema.   Neurological:      Mental Status: She is alert.   Psychiatric:         Mood and Affect: Mood normal.         Assessment / Plan:  Diagnoses and all orders for this visit:    1. Essential hypertension (Primary)  -     CBC & Differential  -     Lipid Panel  -     Basic Metabolic Panel  -     CBC Auto Differential   Continue current medication(s) as noted in the history of present illness.    2. Other hyperlipidemia  -     CBC & Differential  -     Lipid Panel  -     Basic Metabolic Panel  -     CBC Auto Differential  -     atorvastatin (LIPITOR) 10 MG tablet; Take 1 tablet by mouth Daily.  Dispense: 30 tablet; Refill: 5   Continue current medication(s) as noted in the history of present illness.    3. Acquired hypothyroidism  -     T4, Free  -     TSH   Continue current medication(s) as noted in the history of present illness.    4. Gastroesophageal reflux disease, unspecified whether esophagitis present   Continue current medication(s) as noted in the history of present illness.    5. Abnormal liver enzymes  -     Hepatic Function Panel  -     Gamma GT  -     Cancel: Iron  -     Iron Profile  -     Ferritin  -     Ceruloplasmin  -     Nuclear Antigen Antibody, IFA  -     Sedimentation Rate  -     Anti-Smooth Muscle Antibody Titer  -     KINGS + PE  -     Hepatitis Panel, Acute  -     Alpha - 1 -  Antitrypsin  -     Mitochondrial Antibodies, M2    6. Vitamin D deficiency   Continue Vitamin D supplementation.    7. Generalized anxiety disorder  -     buPROPion XL (WELLBUTRIN XL) 150 MG 24 hr tablet; Take 1 tablet by mouth Every Morning.  Dispense: 90 tablet; Refill: 1   Continue current medication(s) as noted in the history of present illness.    8. Encounter for screening mammogram for breast cancer  -     Mammo Screening Digital Tomosynthesis Bilateral With CAD; Future    9. Need for immunization against influenza  -     Fluarix/Fluzone/Afluria Quad>6 Months        Recommended Shingrix at the pharmacy.      Return in about 6 months (around 5/19/2021) for Recheck HTN, fasting.

## 2020-11-20 LAB
ACTIN IGG SERPL-ACNC: 17 UNITS (ref 0–19)
ALBUMIN SERPL ELPH-MCNC: 3.8 G/DL (ref 2.9–4.4)
ALBUMIN/GLOB SERPL: 1.1 {RATIO} (ref 0.7–1.7)
ALPHA1 GLOB SERPL ELPH-MCNC: 0.2 G/DL (ref 0–0.4)
ALPHA2 GLOB SERPL ELPH-MCNC: 1 G/DL (ref 0.4–1)
B-GLOBULIN SERPL ELPH-MCNC: 1.1 G/DL (ref 0.7–1.3)
GAMMA GLOB SERPL ELPH-MCNC: 1.3 G/DL (ref 0.4–1.8)
GLOBULIN SER-MCNC: 3.7 G/DL (ref 2.2–3.9)
IGA SERPL-MCNC: 281 MG/DL (ref 87–352)
IGG SERPL-MCNC: 1283 MG/DL (ref 586–1602)
IGM SERPL-MCNC: 117 MG/DL (ref 26–217)
INTERPRETATION SERPL IEP-IMP: NORMAL
LABORATORY COMMENT REPORT: NORMAL
M PROTEIN SERPL ELPH-MCNC: NORMAL G/DL
MITOCHONDRIA M2 IGG SER-ACNC: <20 UNITS (ref 0–20)
PROT SERPL-MCNC: 7.5 G/DL (ref 6–8.5)

## 2020-11-22 LAB
ANA HOMOGEN TITR SER: NORMAL {TITER}
ANA TITR SER IF: POSITIVE {TITER}
Lab: NORMAL

## 2020-11-25 ENCOUNTER — TELEPHONE (OUTPATIENT)
Dept: INTERNAL MEDICINE | Facility: CLINIC | Age: 60
End: 2020-11-25

## 2020-11-25 DIAGNOSIS — R74.8 ELEVATED LIVER ENZYMES: Primary | ICD-10-CM

## 2020-11-25 DIAGNOSIS — R79.89 ELEVATED FERRITIN LEVEL: ICD-10-CM

## 2020-11-25 NOTE — TELEPHONE ENCOUNTER
PATIENT CALLED TO ASK IF SOMEONE CAN CALL HER BACK AND DISCUSS AGAIN HER LAB RESULTS AND GI REFERRAL.    SHE DIDN'T COMPLETELY HEAR EVERYTHING THAT WAS SAID.    PLEASE CALL PATIENT BACK [phone]

## 2020-11-29 NOTE — TELEPHONE ENCOUNTER
Her liver enzymes are still elevated.  Her elevated  liver panel was normal with the exception of an elevated ferritin level.  I recommend a GI appointment to further evaluate this. Order entered.

## 2021-01-07 DIAGNOSIS — E03.9 ACQUIRED HYPOTHYROIDISM: ICD-10-CM

## 2021-01-07 RX ORDER — LEVOTHYROXINE SODIUM 88 UG/1
TABLET ORAL
Qty: 30 TABLET | Refills: 5 | Status: SHIPPED | OUTPATIENT
Start: 2021-01-07 | End: 2021-01-12 | Stop reason: SDUPTHER

## 2021-01-12 ENCOUNTER — TELEPHONE (OUTPATIENT)
Dept: INTERNAL MEDICINE | Facility: CLINIC | Age: 61
End: 2021-01-12

## 2021-01-12 DIAGNOSIS — E03.9 ACQUIRED HYPOTHYROIDISM: ICD-10-CM

## 2021-01-12 RX ORDER — LEVOTHYROXINE SODIUM 88 UG/1
88 TABLET ORAL DAILY
Qty: 30 TABLET | Refills: 5 | Status: SHIPPED | OUTPATIENT
Start: 2021-01-12 | End: 2021-09-10 | Stop reason: SDUPTHER

## 2021-01-25 ENCOUNTER — TELEMEDICINE (OUTPATIENT)
Dept: GASTROENTEROLOGY | Facility: CLINIC | Age: 61
End: 2021-01-25

## 2021-01-25 DIAGNOSIS — R79.89 ABNORMAL LIVER FUNCTION TESTS: Primary | ICD-10-CM

## 2021-01-25 PROCEDURE — 99244 OFF/OP CNSLTJ NEW/EST MOD 40: CPT | Performed by: INTERNAL MEDICINE

## 2021-02-24 ENCOUNTER — IMMUNIZATION (OUTPATIENT)
Dept: VACCINE CLINIC | Facility: HOSPITAL | Age: 61
End: 2021-02-24

## 2021-02-24 PROCEDURE — 0001A: CPT | Performed by: INTERNAL MEDICINE

## 2021-02-24 PROCEDURE — 91300 HC SARSCOV02 VAC 30MCG/0.3ML IM: CPT | Performed by: INTERNAL MEDICINE

## 2021-02-26 ENCOUNTER — APPOINTMENT (OUTPATIENT)
Dept: ULTRASOUND IMAGING | Facility: HOSPITAL | Age: 61
End: 2021-02-26

## 2021-03-17 ENCOUNTER — IMMUNIZATION (OUTPATIENT)
Dept: VACCINE CLINIC | Facility: HOSPITAL | Age: 61
End: 2021-03-17

## 2021-03-17 PROCEDURE — 0002A: CPT | Performed by: INTERNAL MEDICINE

## 2021-03-17 PROCEDURE — 91300 HC SARSCOV02 VAC 30MCG/0.3ML IM: CPT | Performed by: INTERNAL MEDICINE

## 2021-03-18 ENCOUNTER — APPOINTMENT (OUTPATIENT)
Dept: MAMMOGRAPHY | Facility: HOSPITAL | Age: 61
End: 2021-03-18

## 2021-04-19 ENCOUNTER — TELEMEDICINE (OUTPATIENT)
Dept: INTERNAL MEDICINE | Facility: CLINIC | Age: 61
End: 2021-04-19

## 2021-04-19 VITALS — TEMPERATURE: 97.9 F | SYSTOLIC BLOOD PRESSURE: 150 MMHG | DIASTOLIC BLOOD PRESSURE: 85 MMHG

## 2021-04-19 DIAGNOSIS — H10.33 ACUTE BACTERIAL CONJUNCTIVITIS OF BOTH EYES: Primary | ICD-10-CM

## 2021-04-19 DIAGNOSIS — J06.9 UPPER RESPIRATORY TRACT INFECTION, UNSPECIFIED TYPE: ICD-10-CM

## 2021-04-19 PROCEDURE — 99214 OFFICE O/P EST MOD 30 MIN: CPT | Performed by: INTERNAL MEDICINE

## 2021-04-19 RX ORDER — SULFACETAMIDE SODIUM 100 MG/ML
1 SOLUTION/ DROPS OPHTHALMIC
Qty: 10 ML | Refills: 0 | Status: SHIPPED | OUTPATIENT
Start: 2021-04-19 | End: 2021-04-20 | Stop reason: CLARIF

## 2021-04-19 NOTE — PROGRESS NOTES
Subjective       Roz Randolph is a 60 y.o. female.     Chief Complaint   Patient presents with   • Conjunctivitis   • URI       History obtained from the patient.    You have chosen to receive care through a telehealth visit.  Do you consent to use a video/audio connection for your medical care today? Yes  Connected to the patient via [employer]        Conjunctivitis   Episode onset: several weeks ago. The problem has been gradually worsening. The problem is moderate. Relieved by: Ice. Nothing aggravates the symptoms. Associated symptoms include eye itching, congestion, ear discharge, rhinorrhea (clear), URI, eye discharge (watery initially, now yellow), eye pain (sting) and eye redness. Pertinent negatives include no fever, no decreased vision, no double vision, no photophobia, no abdominal pain, no diarrhea, no nausea, no vomiting, no ear pain, no headaches, no sore throat, no swollen glands, no muscle aches, no neck pain, no cough, no wheezing and no rash. The eye pain is mild. Both eyes are affected.She has been eating and drinking normally. There were no sick contacts. She has received no recent medical care (Has tried Pataday ophthalmic drops, over-the-counter drops for dry eyes, and warm compresses.  ).   URI   This is a new problem. Episode onset: 2-3 days ago. The problem has been unchanged. There has been no fever. Associated symptoms include congestion, rhinorrhea (clear) and sinus pain. Pertinent negatives include no abdominal pain, chest pain, coughing, diarrhea, ear pain, headaches, joint pain, joint swelling, nausea, neck pain, plugged ear sensation, rash, sneezing, sore throat, swollen glands, vomiting or wheezing. Treatments tried: Allegra. The treatment provided no relief.        The following portions of the patient's history were reviewed and updated as appropriate: allergies, current medications, past family history, past medical history, past social history, past surgical history and  problem list.      Review of Systems   Constitutional: Negative for chills, fatigue and fever.   HENT: Positive for congestion, ear discharge, rhinorrhea (clear) and sinus pain. Negative for ear pain, postnasal drip, sinus pressure, sneezing, sore throat and voice change.         No loss of taste and smell     Eyes: Positive for pain (sting), discharge (watery initially, now yellow), redness and itching. Negative for double vision and photophobia.   Respiratory: Negative for cough, shortness of breath and wheezing.    Cardiovascular: Negative for chest pain.   Gastrointestinal: Negative for abdominal pain, diarrhea, nausea and vomiting.   Musculoskeletal: Negative for joint pain, myalgias, neck pain and neck stiffness.   Skin: Negative for rash.   Neurological: Negative for headaches.   Hematological: Negative for adenopathy.           Objective     Blood pressure 150/85, temperature 97.9 °F (36.6 °C), temperature source Temporal.    Physical Exam  Vitals reviewed.   Constitutional:       Comments: Overweight.   HENT:      Mouth/Throat:      Mouth: Mucous membranes are moist.      Pharynx: Oropharynx is clear.   Eyes:      General:         Right eye: No discharge.         Left eye: No discharge.      Conjunctiva/sclera:      Right eye: Right conjunctiva is injected.      Left eye: Left conjunctiva is injected.   Neurological:      Mental Status: She is alert.           Assessment/Plan   Diagnoses and all orders for this visit:    1. Acute bacterial conjunctivitis of both eyes (Primary)  -     sulfacetamide (Bleph-10) 10 % ophthalmic solution; Administer 1 drop to both eyes Every 3 (Three) Hours for 7 days.  Dispense: 10 mL; Refill: 0    2. Upper respiratory tract infection, unspecified type     Recommend continue Allegra, add Flonase and Mucinex, and plenty of fluids.    Return if symptoms worsen or fail to improve.

## 2021-04-20 DIAGNOSIS — H10.33 ACUTE BACTERIAL CONJUNCTIVITIS OF BOTH EYES: Primary | ICD-10-CM

## 2021-04-20 RX ORDER — POLYMYXIN B SULFATE AND TRIMETHOPRIM 1; 10000 MG/ML; [USP'U]/ML
2 SOLUTION OPHTHALMIC EVERY 4 HOURS
Qty: 10 ML | Refills: 0 | Status: SHIPPED | OUTPATIENT
Start: 2021-04-20 | End: 2021-04-27

## 2021-04-22 ENCOUNTER — TELEPHONE (OUTPATIENT)
Dept: INTERNAL MEDICINE | Facility: CLINIC | Age: 61
End: 2021-04-22

## 2021-04-22 DIAGNOSIS — J06.9 UPPER RESPIRATORY TRACT INFECTION, UNSPECIFIED TYPE: Primary | ICD-10-CM

## 2021-04-22 RX ORDER — CEFDINIR 300 MG/1
300 CAPSULE ORAL 2 TIMES DAILY
Qty: 20 CAPSULE | Refills: 0 | Status: SHIPPED | OUTPATIENT
Start: 2021-04-22 | End: 2021-05-02

## 2021-04-22 NOTE — TELEPHONE ENCOUNTER
PATIENT CALLED TO STATE SINUS ISSUES ARE NOT ANY BETTER.  STILL HAVING JAW PAIN AND PRESSURE AROUND NOSE, HEADACHE.  EYES ARE BETTER.     PHARMACY: [facility]    PLEASE CALL: [phone]

## 2021-06-29 ENCOUNTER — TELEPHONE (OUTPATIENT)
Dept: INTERNAL MEDICINE | Facility: CLINIC | Age: 61
End: 2021-06-29

## 2021-06-29 NOTE — TELEPHONE ENCOUNTER
Caller: Roz Randolph    Relationship: Self    Best call back number: [phone]    What is the best time to reach you: ANYTIME    Who are you requesting to speak with (clinical staff, provider,  specific staff member): CLINICAL STAFF    What was the call regarding: PATIENT STATES THAT SHE CANCELLED HER APPOINTMENT FOR 6/28/21 ON MYCHART AND IT STATES THAT SHE NO SHOWED AND SHE DOESN'T WANT TO BE CHARGED    Do you require a callback: YES

## 2021-08-06 ENCOUNTER — TELEPHONE (OUTPATIENT)
Dept: INTERNAL MEDICINE | Facility: CLINIC | Age: 61
End: 2021-08-06

## 2021-08-06 DIAGNOSIS — F41.1 GENERALIZED ANXIETY DISORDER: ICD-10-CM

## 2021-08-06 DIAGNOSIS — I10 ESSENTIAL HYPERTENSION: ICD-10-CM

## 2021-08-06 RX ORDER — BUPROPION HYDROCHLORIDE 150 MG/1
150 TABLET ORAL EVERY MORNING
Qty: 90 TABLET | Refills: 0 | Status: SHIPPED | OUTPATIENT
Start: 2021-08-06 | End: 2021-11-09 | Stop reason: SDUPTHER

## 2021-08-06 RX ORDER — LOSARTAN POTASSIUM AND HYDROCHLOROTHIAZIDE 25; 100 MG/1; MG/1
1 TABLET ORAL DAILY
Qty: 90 TABLET | Refills: 0 | Status: SHIPPED | OUTPATIENT
Start: 2021-08-06 | End: 2021-11-09 | Stop reason: SDUPTHER

## 2021-08-18 DIAGNOSIS — E78.49 OTHER HYPERLIPIDEMIA: ICD-10-CM

## 2021-08-18 RX ORDER — ATORVASTATIN CALCIUM 10 MG/1
10 TABLET, FILM COATED ORAL DAILY
Qty: 30 TABLET | Refills: 5 | Status: SHIPPED | OUTPATIENT
Start: 2021-08-18 | End: 2022-02-08 | Stop reason: SDUPTHER

## 2021-08-25 ENCOUNTER — OFFICE VISIT (OUTPATIENT)
Dept: INTERNAL MEDICINE | Facility: CLINIC | Age: 61
End: 2021-08-25

## 2021-08-25 ENCOUNTER — LAB (OUTPATIENT)
Dept: LAB | Facility: HOSPITAL | Age: 61
End: 2021-08-25

## 2021-08-25 VITALS
WEIGHT: 169.13 LBS | DIASTOLIC BLOOD PRESSURE: 106 MMHG | SYSTOLIC BLOOD PRESSURE: 169 MMHG | HEART RATE: 100 BPM | TEMPERATURE: 97.1 F | BODY MASS INDEX: 29.03 KG/M2 | RESPIRATION RATE: 18 BRPM

## 2021-08-25 DIAGNOSIS — E78.49 OTHER HYPERLIPIDEMIA: ICD-10-CM

## 2021-08-25 DIAGNOSIS — I10 ESSENTIAL HYPERTENSION: Primary | ICD-10-CM

## 2021-08-25 DIAGNOSIS — E55.9 VITAMIN D DEFICIENCY: ICD-10-CM

## 2021-08-25 DIAGNOSIS — Z13.6 SCREENING FOR CARDIOVASCULAR CONDITION: ICD-10-CM

## 2021-08-25 DIAGNOSIS — Z23 NEED FOR TDAP VACCINATION: ICD-10-CM

## 2021-08-25 DIAGNOSIS — F41.1 GENERALIZED ANXIETY DISORDER: ICD-10-CM

## 2021-08-25 DIAGNOSIS — R74.8 ABNORMAL LIVER ENZYMES: ICD-10-CM

## 2021-08-25 DIAGNOSIS — K21.9 GASTROESOPHAGEAL REFLUX DISEASE, UNSPECIFIED WHETHER ESOPHAGITIS PRESENT: ICD-10-CM

## 2021-08-25 DIAGNOSIS — E03.9 ACQUIRED HYPOTHYROIDISM: ICD-10-CM

## 2021-08-25 DIAGNOSIS — R79.89 ABNORMAL LIVER FUNCTION TESTS: ICD-10-CM

## 2021-08-25 LAB
ALBUMIN SERPL-MCNC: 4.5 G/DL (ref 3.5–5.2)
ALBUMIN/GLOB SERPL: 1.5 G/DL
ALP SERPL-CCNC: 53 U/L (ref 39–117)
ALT SERPL W P-5'-P-CCNC: 57 U/L (ref 1–33)
ANION GAP SERPL CALCULATED.3IONS-SCNC: 13.4 MMOL/L (ref 5–15)
AST SERPL-CCNC: 68 U/L (ref 1–32)
BILIRUB BLD-MCNC: NEGATIVE MG/DL
BILIRUB SERPL-MCNC: 0.7 MG/DL (ref 0–1.2)
BUN SERPL-MCNC: 13 MG/DL (ref 6–20)
BUN/CREAT SERPL: 16.3 (ref 7–25)
CALCIUM SPEC-SCNC: 9.4 MG/DL (ref 8.6–10.5)
CHLORIDE SERPL-SCNC: 101 MMOL/L (ref 98–107)
CHOLEST SERPL-MCNC: 199 MG/DL (ref 0–200)
CLARITY, POC: CLEAR
CO2 SERPL-SCNC: 22.6 MMOL/L (ref 22–29)
COLOR UR: YELLOW
CREAT SERPL-MCNC: 0.8 MG/DL (ref 0.57–1)
DEPRECATED RDW RBC AUTO: 45.5 FL (ref 37–54)
ERYTHROCYTE [DISTWIDTH] IN BLOOD BY AUTOMATED COUNT: 12.3 % (ref 12.3–15.4)
EXPIRATION DATE: NORMAL
GFR SERPL CREATININE-BSD FRML MDRD: 73 ML/MIN/1.73
GLOBULIN UR ELPH-MCNC: 3 GM/DL
GLUCOSE SERPL-MCNC: 88 MG/DL (ref 65–99)
GLUCOSE UR STRIP-MCNC: NEGATIVE MG/DL
HBV SURFACE AG SERPL QL IA: NORMAL
HCT VFR BLD AUTO: 41.7 % (ref 34–46.6)
HDLC SERPL-MCNC: 66 MG/DL (ref 40–60)
HGB BLD-MCNC: 14 G/DL (ref 12–15.9)
IGA1 MFR SER: 247 MG/DL (ref 70–400)
IGG1 SER-MCNC: 1129 MG/DL (ref 700–1600)
IGM SERPL-MCNC: 103 MG/DL (ref 40–230)
KETONES UR QL: NEGATIVE
LDLC SERPL CALC-MCNC: 102 MG/DL (ref 0–100)
LDLC/HDLC SERPL: 1.46 {RATIO}
LEUKOCYTE EST, POC: NEGATIVE
Lab: NORMAL
MCH RBC QN AUTO: 33.9 PG (ref 26.6–33)
MCHC RBC AUTO-ENTMCNC: 33.6 G/DL (ref 31.5–35.7)
MCV RBC AUTO: 101 FL (ref 79–97)
NITRITE UR-MCNC: NEGATIVE MG/ML
PH UR: 6.5 [PH] (ref 5–8)
PLATELET # BLD AUTO: 264 10*3/MM3 (ref 140–450)
PMV BLD AUTO: 10.8 FL (ref 6–12)
POTASSIUM SERPL-SCNC: 3.8 MMOL/L (ref 3.5–5.2)
PROT SERPL-MCNC: 7.5 G/DL (ref 6–8.5)
PROT UR STRIP-MCNC: NEGATIVE MG/DL
RBC # BLD AUTO: 4.13 10*6/MM3 (ref 3.77–5.28)
RBC # UR STRIP: NEGATIVE /UL
SODIUM SERPL-SCNC: 137 MMOL/L (ref 136–145)
SP GR UR: 1.01 (ref 1–1.03)
T4 FREE SERPL-MCNC: 1.24 NG/DL (ref 0.93–1.7)
TRIGL SERPL-MCNC: 183 MG/DL (ref 0–150)
TSH SERPL DL<=0.05 MIU/L-ACNC: 1.83 UIU/ML (ref 0.27–4.2)
UROBILINOGEN UR QL: NORMAL
VLDLC SERPL-MCNC: 31 MG/DL (ref 5–40)
WBC # BLD AUTO: 8.19 10*3/MM3 (ref 3.4–10.8)

## 2021-08-25 PROCEDURE — 83516 IMMUNOASSAY NONANTIBODY: CPT

## 2021-08-25 PROCEDURE — 90715 TDAP VACCINE 7 YRS/> IM: CPT | Performed by: INTERNAL MEDICINE

## 2021-08-25 PROCEDURE — 81003 URINALYSIS AUTO W/O SCOPE: CPT | Performed by: INTERNAL MEDICINE

## 2021-08-25 PROCEDURE — 84443 ASSAY THYROID STIM HORMONE: CPT | Performed by: INTERNAL MEDICINE

## 2021-08-25 PROCEDURE — 99214 OFFICE O/P EST MOD 30 MIN: CPT | Performed by: INTERNAL MEDICINE

## 2021-08-25 PROCEDURE — 90471 IMMUNIZATION ADMIN: CPT | Performed by: INTERNAL MEDICINE

## 2021-08-25 PROCEDURE — 82306 VITAMIN D 25 HYDROXY: CPT | Performed by: INTERNAL MEDICINE

## 2021-08-25 PROCEDURE — 86704 HEP B CORE ANTIBODY TOTAL: CPT

## 2021-08-25 PROCEDURE — 80053 COMPREHEN METABOLIC PANEL: CPT

## 2021-08-25 PROCEDURE — 36415 COLL VENOUS BLD VENIPUNCTURE: CPT

## 2021-08-25 PROCEDURE — 80074 ACUTE HEPATITIS PANEL: CPT

## 2021-08-25 PROCEDURE — 81256 HFE GENE: CPT

## 2021-08-25 PROCEDURE — 84439 ASSAY OF FREE THYROXINE: CPT | Performed by: INTERNAL MEDICINE

## 2021-08-25 PROCEDURE — 82784 ASSAY IGA/IGD/IGG/IGM EACH: CPT

## 2021-08-25 PROCEDURE — 80061 LIPID PANEL: CPT | Performed by: INTERNAL MEDICINE

## 2021-08-25 PROCEDURE — 85027 COMPLETE CBC AUTOMATED: CPT

## 2021-08-25 PROCEDURE — 86706 HEP B SURFACE ANTIBODY: CPT

## 2021-08-25 NOTE — PATIENT INSTRUCTIONS
Please remember to schedule your Mammogram, as we discussed.    I recommend Shingrix (new Shingles vaccine) at the pharmacy.      MyPlate from USDA    MyPlate is an outline of a general healthy diet based on the 2010 Dietary Guidelines for Americans, from the U.[place]. Department of Agriculture (USDA). It sets guidelines for how much food you should eat from each food group based on your age, sex, and level of physical activity.  What are tips for following MyPlate?  To follow MyPlate recommendations:  · Eat a wide variety of fruits and vegetables, grains, and protein foods.  · Serve smaller portions and eat less food throughout the day.  · Limit portion sizes to avoid overeating.  · Enjoy your food.  · Get at least 150 minutes of exercise every week. This is about 30 minutes each day, 5 or more days per week.  It can be difficult to have every meal look like MyPlate. Think about MyPlate as eating guidelines for an entire day, rather than each individual meal.  Fruits and vegetables  · Make half of your plate fruits and vegetables.  · Eat many different colors of fruits and vegetables each day.  · For a 2,000 calorie daily food plan, eat:  ? 2½ cups of vegetables every day.  ? 2 cups of fruit every day.  · 1 cup is equal to:  ? 1 cup raw or cooked vegetables.  ? 1 cup raw fruit.  ? 1 medium-sized orange, apple, or banana.  ? 1 cup 100% fruit or vegetable juice.  ? 2 cups raw leafy greens, such as lettuce, spinach, or kale.  ? ½ cup dried fruit.  Grains  · One fourth of your plate should be grains.  · Make at least half of the grains you eat each day whole grains.  · For a 2,000 calorie daily food plan, eat 6 oz of grains every day.  · 1 oz is equal to:  ? 1 slice bread.  ? 1 cup cereal.  ? ½ cup cooked rice, cereal, or pasta.  Protein  · One fourth of your plate should be protein.  · Eat a wide variety of protein foods, including meat, poultry, fish, eggs, beans, nuts, and tofu.  · For a 2,000 calorie daily food plan,  eat 5½ oz of protein every day.  · 1 oz is equal to:  ? 1 oz meat, poultry, or fish.  ? ¼ cup cooked beans.  ? 1 egg.  ? ½ oz nuts or seeds.  ? 1 Tbsp peanut butter.  Dairy  · Drink fat-free or low-fat (1%) milk.  · Eat or drink dairy as a side to meals.  · For a 2,000 calorie daily food plan, eat or drink 3 cups of dairy every day.  · 1 cup is equal to:  ? 1 cup milk, yogurt, cottage cheese, or soy milk (soy beverage).  ? 2 oz processed cheese.  ? 1½ oz natural cheese.  Fats, oils, salt, and sugars  · Only small amounts of oils are recommended.  · Avoid foods that are high in calories and low in nutritional value (empty calories), like foods high in fat or added sugars.  · Choose foods that are low in salt (sodium). Choose foods that have less than 140 milligrams (mg) of sodium per serving.  · Drink water instead of sugary drinks. Drink enough water each day to keep your urine pale yellow.  Where to find support  · Work with your health care provider or a nutrition specialist (dietitian) to develop a customized eating plan that is right for you.  · Download an yumiko (mobile application) to help you track your daily food intake.  Where to find more information  · Go to ChooseMyPlate.gov for more information.  Summary  · MyPlate is a general guideline for healthy eating from the USDA. It is based on the 2010 Dietary Guidelines for Americans.  · In general, fruits and vegetables should take up ½ of your plate, grains should take up ¼ of your plate, and protein should take up ¼ of your plate.  This information is not intended to replace advice given to you by your health care provider. Make sure you discuss any questions you have with your health care provider.  Document Revised: 05/21/2020 Document Reviewed: 03/19/2018  Elsevier Patient Education © 2021 [employer].      Calorie Counting for Weight Loss  Calories are units of energy. Your body needs a certain number of calories from food to keep going throughout the  day. When you eat or drink more calories than your body needs, your body stores the extra calories mostly as fat. When you eat or drink fewer calories than your body needs, your body burns fat to get the energy it needs.  Calorie counting means keeping track of how many calories you eat and drink each day. Calorie counting can be helpful if you need to lose weight. If you eat fewer calories than your body needs, you should lose weight. Ask your health care provider what a healthy weight is for you.  For calorie counting to work, you will need to eat the right number of calories each day to lose a healthy amount of weight per week. A dietitian can help you figure out how many calories you need in a day and will suggest ways to reach your calorie goal.  · A healthy amount of weight to lose each week is usually 1-2 lb (0.5-0.9 kg). This usually means that your daily calorie intake should be reduced by 500-750 calories.  · Eating 1,200-1,500 calories a day can help most women lose weight.  · Eating 1,500-1,800 calories a day can help most men lose weight.  What do I need to know about calorie counting?  Work with your health care provider or dietitian to determine how many calories you should get each day. To meet your daily calorie goal, you will need to:  · Find out how many calories are in each food that you would like to eat. Try to do this before you eat.  · Decide how much of the food you plan to eat.  · Keep a food log. Do this by writing down what you ate and how many calories it had.  To successfully lose weight, it is important to balance calorie counting with a healthy lifestyle that includes regular activity.  Where do I find calorie information?    The number of calories in a food can be found on a Nutrition Facts label. If a food does not have a Nutrition Facts label, try to look up the calories online or ask your dietitian for help.  Remember that calories are listed per serving. If you choose to have  more than one serving of a food, you will have to multiply the calories per serving by the number of servings you plan to eat. For example, the label on a package of bread might say that a serving size is 1 slice and that there are 90 calories in a serving. If you eat 1 slice, you will have eaten 90 calories. If you eat 2 slices, you will have eaten 180 calories.  How do I keep a food log?  After each time that you eat, record the following in your food log as soon as possible:  · What you ate. Be sure to include toppings, sauces, and other extras on the food.  · How much you ate. This can be measured in cups, ounces, or number of items.  · How many calories were in each food and drink.  · The total number of calories in the food you ate.  Keep your food log near you, such as in a pocket-sized notebook or on an yumiko or website on your mobile phone. Some programs will calculate calories for you and show you how many calories you have left to meet your daily goal.  What are some portion-control tips?  · Know how many calories are in a serving. This will help you know how many servings you can have of a certain food.  · Use a measuring cup to measure serving sizes. You could also try weighing out portions on a kitchen scale. With time, you will be able to estimate serving sizes for some foods.  · Take time to put servings of different foods on your favorite plates or in your favorite bowls and cups so you know what a serving looks like.  · Try not to eat straight from a food's packaging, such as from a bag or box. Eating straight from the package makes it hard to see how much you are eating and can lead to overeating. Put the amount you would like to eat in a cup or on a plate to make sure you are eating the right portion.  · Use smaller plates, glasses, and bowls for smaller portions and to prevent overeating.  · Try not to multitask. For example, avoid watching TV or using your computer while eating. If it is time to  eat, sit down at a table and enjoy your food. This will help you recognize when you are full. It will also help you be more mindful of what and how much you are eating.  What are tips for following this plan?  Reading food labels  · Check the calorie count compared with the serving size. The serving size may be smaller than what you are used to eating.  · Check the source of the calories. Try to choose foods that are high in protein, fiber, and vitamins, and low in saturated fat, trans fat, and sodium.  Shopping  · Read nutrition labels while you shop. This will help you make healthy decisions about which foods to buy.  · Pay attention to nutrition labels for low-fat or fat-free foods. These foods sometimes have the same number of calories or more calories than the full-fat versions. They also often have added sugar, starch, or salt to make up for flavor that was removed with the fat.  · Make a grocery list of lower-calorie foods and stick to it.  Cooking  · Try to cook your favorite foods in a healthier way. For example, try baking instead of frying.  · Use low-fat dairy products.  Meal planning  · Use more fruits and vegetables. One-half of your plate should be fruits and vegetables.  · Include lean proteins, such as chicken, turkey, and fish.  Lifestyle  Each week, aim to do one of the following:  · 150 minutes of moderate exercise, such as walking.  · 75 minutes of vigorous exercise, such as running.  General information  · Know how many calories are in the foods you eat most often. This will help you calculate calorie counts faster.  · Find a way of tracking calories that works for you. Get creative. Try different apps or programs if writing down calories does not work for you.  What foods should I eat?    · Eat nutritious foods. It is better to have a nutritious, high-calorie food, such as an avocado, than a food with few nutrients, such as a bag of potato chips.  · Use your calories on foods and drinks that  will fill you up and will not leave you hungry soon after eating.  ? Examples of foods that fill you up are nuts and nut butters, vegetables, lean proteins, and high-fiber foods such as whole grains. High-fiber foods are foods with more than 5 g of fiber per serving.  · Pay attention to calories in drinks. Low-calorie drinks include water and unsweetened drinks.  The items listed above may not be a complete list of foods and beverages you can eat. Contact a dietitian for more information.  What foods should I limit?  Limit foods or drinks that are not good sources of vitamins, minerals, or protein or that are high in unhealthy fats. These include:  · Candy.  · Other sweets.  · Sodas, specialty coffee drinks, alcohol, and juice.  The items listed above may not be a complete list of foods and beverages you should avoid. Contact a dietitian for more information.  How do I count calories when eating out?  · Pay attention to portions. Often, portions are much larger when eating out. Try these tips to keep portions smaller:  ? Consider sharing a meal instead of getting your own.  ? If you get your own meal, eat only half of it. Before you start eating, ask for a container and put half of your meal into it.  ? When available, consider ordering smaller portions from the menu instead of full portions.  · Pay attention to your food and drink choices. Knowing the way food is cooked and what is included with the meal can help you eat fewer calories.  ? If calories are listed on the menu, choose the lower-calorie options.  ? Choose dishes that include vegetables, fruits, whole grains, low-fat dairy products, and lean proteins.  ? Choose items that are boiled, broiled, grilled, or steamed. Avoid items that are buttered, battered, fried, or served with cream sauce. Items labeled as crispy are usually fried, unless stated otherwise.  ? Choose water, low-fat milk, unsweetened iced tea, or other drinks without added sugar. If you  want an alcoholic beverage, choose a lower-calorie option, such as a glass of wine or light beer.  ? Ask for dressings, sauces, and syrups on the side. These are usually high in calories, so you should limit the amount you eat.  ? If you want a salad, choose a garden salad and ask for grilled meats. Avoid extra toppings such as roche, cheese, or fried items. Ask for the dressing on the side, or ask for olive oil and vinegar or lemon to use as dressing.  · Estimate how many servings of a food you are given. Knowing serving sizes will help you be aware of how much food you are eating at restaurants.  Where to find more information  · Centers for Disease Control and Prevention: www.cdc.gov  · [place] Department of Agriculture: myplate.gov  Summary  · Calorie counting means keeping track of how many calories you eat and drink each day. If you eat fewer calories than your body needs, you should lose weight.  · A healthy amount of weight to lose per week is usually 1-2 lb (0.5-0.9 kg). This usually means reducing your daily calorie intake by 500-750 calories.  · The number of calories in a food can be found on a Nutrition Facts label. If a food does not have a Nutrition Facts label, try to look up the calories online or ask your dietitian for help.  · Use smaller plates, glasses, and bowls for smaller portions and to prevent overeating.  · Use your calories on foods and drinks that will fill you up and not leave you hungry shortly after a meal.  This information is not intended to replace advice given to you by your health care provider. Make sure you discuss any questions you have with your health care provider.  Document Revised: 01/28/2021 Document Reviewed: 01/28/2021  Elsevier Patient Education © 2021 [employer].      Exercising to Lose Weight  Exercise is structured, repetitive physical activity to improve fitness and health. Getting regular exercise is important for everyone. It is especially important if you are  overweight. Being overweight increases your risk of heart disease, stroke, diabetes, high blood pressure, and several types of cancer. Reducing your calorie intake and exercising can help you lose weight.  Exercise is usually categorized as moderate or vigorous intensity. To lose weight, most people need to do a certain amount of moderate-intensity or vigorous-intensity exercise each week.  Moderate-intensity exercise    Moderate-intensity exercise is any activity that gets you moving enough to burn at least three times more energy (calories) than if you were sitting.  Examples of moderate exercise include:  · Walking a mile in 15 minutes.  · Doing light yard work.  · Biking at an easy pace.  Most people should get at least 150 minutes (2 hours and 30 minutes) a week of moderate-intensity exercise to maintain their body weight.  Vigorous-intensity exercise  Vigorous-intensity exercise is any activity that gets you moving enough to burn at least six times more calories than if you were sitting. When you exercise at this intensity, you should be working hard enough that you are not able to carry on a conversation.  Examples of vigorous exercise include:  · Running.  · Playing a team sport, such as football, basketball, and soccer.  · Jumping rope.  Most people should get at least 75 minutes (1 hour and 15 minutes) a week of vigorous-intensity exercise to maintain their body weight.  How can exercise affect me?  When you exercise enough to burn more calories than you eat, you lose weight. Exercise also reduces body fat and builds muscle. The more muscle you have, the more calories you burn. Exercise also:  · Improves mood.  · Reduces stress and tension.  · Improves your overall fitness, flexibility, and endurance.  · Increases bone strength.  The amount of exercise you need to lose weight depends on:  · Your age.  · The type of exercise.  · Any health conditions you have.  · Your overall physical ability.  Talk to your  health care provider about how much exercise you need and what types of activities are safe for you.  What actions can I take to lose weight?  Nutrition    · Make changes to your diet as told by your health care provider or diet and nutrition specialist (dietitian). This may include:  ? Eating fewer calories.  ? Eating more protein.  ? Eating less unhealthy fats.  ? Eating a diet that includes fresh fruits and vegetables, whole grains, low-fat dairy products, and lean protein.  ? Avoiding foods with added fat, salt, and sugar.  · Drink plenty of water while you exercise to prevent dehydration or heat stroke.  Activity  · Choose an activity that you enjoy and set realistic goals. Your health care provider can help you make an exercise plan that works for you.  · Exercise at a moderate or vigorous intensity most days of the week.  ? The intensity of exercise may vary from person to person. You can tell how intense a workout is for you by paying attention to your breathing and heartbeat. Most people will notice their breathing and heartbeat get faster with more intense exercise.  · Do resistance training twice each week, such as:  ? Push-ups.  ? Sit-ups.  ? Lifting weights.  ? Using resistance bands.  · Getting short amounts of exercise can be just as helpful as long structured periods of exercise. If you have trouble finding time to exercise, try to include exercise in your daily routine.  ? Get up, stretch, and walk around every 30 minutes throughout the day.  ? Go for a walk during your lunch break.  ? Park your car farther away from your destination.  ? If you take public transportation, get off one stop early and walk the rest of the way.  ? Make phone calls while standing up and walking around.  ? Take the stairs instead of elevators or escalators.  · Wear comfortable clothes and shoes with good support.  · Do not exercise so much that you hurt yourself, feel dizzy, or get very short of breath.  Where to find  more information  · [place] Department of Health and Human Services: www.hhs.gov  · Centers for Disease Control and Prevention (CDC): www.cdc.gov  Contact a health care provider:  · Before starting a new exercise program.  · If you have questions or concerns about your weight.  · If you have a medical problem that keeps you from exercising.  Get help right away if you have any of the following while exercising:  · Injury.  · Dizziness.  · Difficulty breathing or shortness of breath that does not go away when you stop exercising.  · Chest pain.  · Rapid heartbeat.  Summary  · Being overweight increases your risk of heart disease, stroke, diabetes, high blood pressure, and several types of cancer.  · Losing weight happens when you burn more calories than you eat.  · Reducing the amount of calories you eat in addition to getting regular moderate or vigorous exercise each week helps you lose weight.  This information is not intended to replace advice given to you by your health care provider. Make sure you discuss any questions you have with your health care provider.  Document Revised: 04/15/2021 Document Reviewed: 04/15/2021  Elsevier Patient Education © 2021 [employer]

## 2021-08-25 NOTE — PROGRESS NOTES
Subjective       Roz Randolph is a 61 y.o. female.     Chief Complaint   Patient presents with   • Hypertension     6 month follow up        History obtained from the patient.      History of Present Illness     [facility]: The patient is here today for a follow-up visit.  She was last seen 11/1/2020.  Her Hypertension has been stable.   Medication(s): Losartan HCTZ.   Her Hyperlipidemia has been unstable.   Her LDL goal is < 130,  last LDL was 170, .   Medication(s): Atorvastatin  The patient is mostly adherent with her medication regimen.  She takes her Atorvastatin approximately 5 times per week.  She denies medication side effects.       Interval Events:  The patient states her blood pressure at home has been 130's / 80's.  She gets nervous at the doctor's office.  She also states she is under increased stress, worried about her son.     Symptoms:  Denies chest pain, dyspnea, PORTILLO, orthopnea, PND, palpitations, syncope, lower extremity edema,  claudication, lightheadedness, and dizziness.  Associated Symptoms: Weight increased 4 pounds since last visit. Stable fatigue. No headache, polydipsia, polyuria, myalgias, arthralgias, memory loss, concentration problems, or focal neurologic deficits.      Lifestyle and Disease Management: Diet: She consumes a diverse and 1/2  healthy diet, overall.  Weight Issues: She has weight concerns. Exercise: She walks twice per week.    Tobacco Use: Never a Smoker.      Hypothyroidism Follow-Up: The patient is being seen for follow-up of Hypothyroidism, which is stable.   Interval Events: On 11/19/2020, TSH and T4 were normal.  Symptoms:  Weight increased 4 pounds since last visit.  Stable fatigue. Denies heat / cold intolerance, constipation, memory loss, trouble concentrating, hair loss, and dry skin.   Associated Symptoms: no myalgias, arthralgias, or paresthesias.   Medications:  Levothyroxine (Synthroid).   The patient is adherent  to her medication regimen,  and she denies medication side effects.       GERD Follow-up: The patient is being seen for a routine clinic follow-up of Gastroesophageal Reflux Disease, which is stable.  Procedures:  EGD 12/21/15 (small hiatal hernia, mild duodenitis, and concentric espohageal rings suggestive of esophsgitis- path c/w mild chronic esophagitis w/ increased eosinophils suggestive of reflux ([place]).   Comorbid Illness: On 6/16/2020, liver enzymes were elevated ( and ALT 87). Elevated liver panel done on 11/19/2020 was significant for an elevated ARSH, GGT (249), Ferritin (778), ALT (80),  and AST (98 ).  Liver ultrasound was ordered on 1/25/2021, but not done.  She saw Dr. Masters on 1/28/2021.  He felt her ARSH was a false positive.  He ordered additional labs, but the patient states she forgot about them.  Interval Events: The patient had an episode of Diverticulitis in [place] in June 2021.  She was seen in the ED there and given antibiotics.  Symptoms:  No abdominal pain, heartburn, acid regurgitation, nausea, vomiting, dysphagia, odynophagia, hematemesis, hematochezia, melena, early satiety, belching, and bloating.  Associated Symptoms:  no chronic sore throat, hoarseness, cough, or wheezing.   Medication: Tums prn.      Vitamin D Deficiency Follow-Up: The patient is being seen for follow-up of Vitamin D Deficiency, which is stable.    Interval Events: Vitamin D level on 6/16/2020 was 29.3..   Symptoms: Stable fatigue.  No  myalgias, arthralgias, paresthesias, balance issues, or gait abnormality.  Medication:  Vitamin D3 (cholecalciferol) 2000 IU daily.      Anxiety Disorder Follow-Up: The patient is being seen for follow-up of Anxiety, which is stable.  Comorbid Illnesses None.   Interval Events:  None.   Symptoms: Stable anxiety.  Denies depression, panic attacks, insomnia, anhedonia, memory loss, and difficulty concentrating.  Associated Symptoms: no suicidal ideation.   Medication(s):  Wellbutrin XL.   The patient is adherent to her medication regimen, and she denies medication side effects.     Current Outpatient Medications on File Prior to Visit   Medication Sig Dispense Refill   • atorvastatin (LIPITOR) 10 MG tablet Take 1 tablet by mouth Daily. 30 tablet 5   • buPROPion XL (WELLBUTRIN XL) 150 MG 24 hr tablet Take 1 tablet by mouth Every Morning. 90 tablet 0   • Cholecalciferol (VITAMIN D3) 25 MCG (1000 UT) capsule Take  by mouth.     • levothyroxine (SYNTHROID, LEVOTHROID) 88 MCG tablet Take 1 tablet by mouth Daily. 30 tablet 5   • losartan-hydrochlorothiazide (Hyzaar) 100-25 MG per tablet Take 1 tablet by mouth Daily. 90 tablet 0     No current facility-administered medications on file prior to visit.       Current outpatient and discharge medications have been reconciled for the patient.  Reviewed by: Maia Melton MD        The following portions of the patient's history were reviewed and updated as appropriate: allergies, current medications, past family history, past medical history, past social history, past surgical history and problem list.    Review of Systems   Constitutional: Positive for fatigue. Negative for unexpected weight change.   Eyes: Negative for visual disturbance.   Respiratory: Negative for cough, shortness of breath and wheezing.    Cardiovascular: Negative for chest pain, palpitations and leg swelling.        No PORTILLO, orthopnea, or claudication.   Gastrointestinal: Negative for abdominal pain, blood in stool, constipation, diarrhea, nausea and vomiting.        Denies melena.   Endocrine: Negative for polydipsia and polyuria.   Musculoskeletal: Negative for arthralgias and myalgias.   Neurological: Negative for dizziness, syncope, light-headedness and headaches.        No memory issues.   Psychiatric/Behavioral: Negative for decreased concentration, sleep disturbance and suicidal ideas. The patient is nervous/anxious.          Objective       Blood pressure (!)  169/106, pulse 100, temperature 97.1 °F (36.2 °C), temperature source Temporal, resp. rate 18, weight 76.7 kg (169 lb 2 oz).  Body mass index is 29.03 kg/m².    Repeat blood pressure 160/90.    Physical Exam  Vitals and nursing note reviewed.   Constitutional:       Appearance: She is well-developed.      Comments: Overweight.   Neck:      Thyroid: No thyroid mass or thyromegaly.      Vascular: No carotid bruit.   Cardiovascular:      Rate and Rhythm: Normal rate and regular rhythm.      Pulses: Normal pulses.      Heart sounds: Normal heart sounds. No murmur heard.   No friction rub. No gallop.    Pulmonary:      Effort: Pulmonary effort is normal.      Breath sounds: Normal breath sounds.   Abdominal:      General: Bowel sounds are normal. There is no distension or abdominal bruit.      Palpations: Abdomen is soft. There is no hepatomegaly, splenomegaly or mass.      Tenderness: There is no abdominal tenderness.   Musculoskeletal:      Cervical back: Normal range of motion and neck supple.      Right lower leg: No edema.      Left lower leg: No edema.   Neurological:      Mental Status: She is alert.   Psychiatric:         Mood and Affect: Mood normal.       Results for orders placed or performed in visit on 08/25/21   POC Urinalysis Dipstick, Automated    Specimen: Urine   Result Value Ref Range    Color Yellow Yellow, Straw, Dark Yellow, María Elena    Clarity, UA Clear Clear    Specific Gravity  1.010 1.005 - 1.030    pH, Urine 6.5 5.0 - 8.0    Leukocytes Negative Negative    Nitrite, UA Negative Negative    Protein, POC Negative Negative mg/dL    Glucose, UA Negative Negative, 1000 mg/dL (3+) mg/dL    Ketones, UA Negative Negative    Urobilinogen, UA Normal Normal    Bilirubin Negative Negative    Blood, UA Negative Negative    Lot Number [id number]     Expiration Date 04/30/2022        Assessment / Plan:  Diagnoses and all orders for this visit:    1. Essential hypertension (Primary)  -     Lipid Panel  -     POC  Urinalysis Dipstick, Automated   Continue current medication(s) as noted in the history of present illness.    2. Other hyperlipidemia   -     Lipid Panel    3. Acquired hypothyroidism  -     TSH  -     T4, Free   Continue current medication(s) as noted in the history of present illness.    4. Gastroesophageal reflux disease, unspecified whether esophagitis present   Continue current medication(s) as noted in the history of present illness.   Continue current medication(s) as noted in the history of present illness.    5. Vitamin D deficiency  -     Vitamin D 25 Hydroxy   Continue Vitamin D supplementation.    6. Abnormal liver enzymes   Additional GI labs done today.   Follow up per GI.     7. Generalized anxiety disorder   Continue current medication(s) as noted in the history of present illness.    8. Screening for cardiovascular condition  -     CT Cardiac Calcium Score Without Dye; Future    9. Need for Tdap vaccination  -     Tdap Vaccine Greater Than or Equal To 6yo IM      Patient's Body mass index is 29.03 kg/m². indicating that she is overweight (BMI 25-29.9). Obesity-related health conditions include the following: hypertension, dyslipidemias and GERD. Obesity is worsening. BMI is is above average; BMI management plan is completed. We discussed portion control and increasing exercise..    The patient agreed to schedule her Mammogram.    Recommended Shingrix (new Shingles vaccine) at the pharmacy.      Return in about 6 months (around 2/25/2022) for Recheck HTN, fasting.

## 2021-08-26 LAB
25(OH)D3 SERPL-MCNC: 31.1 NG/ML (ref 30–100)
HBV CORE IGM SERPL QL IA: NORMAL
HBV SURFACE AB SER RIA-ACNC: NORMAL
HCV AB SER DONR QL: NORMAL

## 2021-08-27 LAB
ACTIN IGG SERPL-ACNC: 13 UNITS (ref 0–19)
HAV AB SER QL IA: POSITIVE
HBV CORE AB SERPL QL IA: NEGATIVE
MITOCHONDRIA M2 IGG SER-ACNC: <20 UNITS (ref 0–20)

## 2021-09-02 LAB — HFE GENE MUT ANL BLD/T: NORMAL

## 2021-09-10 DIAGNOSIS — E03.9 ACQUIRED HYPOTHYROIDISM: ICD-10-CM

## 2021-09-10 RX ORDER — LEVOTHYROXINE SODIUM 88 UG/1
88 TABLET ORAL DAILY
Qty: 30 TABLET | Refills: 5 | Status: SHIPPED | OUTPATIENT
Start: 2021-09-10 | End: 2021-12-14 | Stop reason: SDUPTHER

## 2021-11-09 DIAGNOSIS — I10 ESSENTIAL HYPERTENSION: ICD-10-CM

## 2021-11-09 DIAGNOSIS — F41.1 GENERALIZED ANXIETY DISORDER: ICD-10-CM

## 2021-11-09 RX ORDER — BUPROPION HYDROCHLORIDE 150 MG/1
150 TABLET ORAL EVERY MORNING
Qty: 90 TABLET | Refills: 1 | Status: SHIPPED | OUTPATIENT
Start: 2021-11-09 | End: 2022-06-09 | Stop reason: SDUPTHER

## 2021-11-09 RX ORDER — LOSARTAN POTASSIUM AND HYDROCHLOROTHIAZIDE 25; 100 MG/1; MG/1
1 TABLET ORAL DAILY
Qty: 90 TABLET | Refills: 1 | Status: SHIPPED | OUTPATIENT
Start: 2021-11-09 | End: 2022-06-09 | Stop reason: SDUPTHER

## 2021-12-14 DIAGNOSIS — E03.9 ACQUIRED HYPOTHYROIDISM: ICD-10-CM

## 2021-12-14 RX ORDER — LEVOTHYROXINE SODIUM 88 UG/1
88 TABLET ORAL DAILY
Qty: 90 TABLET | Refills: 1 | Status: SHIPPED | OUTPATIENT
Start: 2021-12-14 | End: 2022-06-08 | Stop reason: SDUPTHER

## 2022-01-01 DIAGNOSIS — U07.1 COVID-19 VIRUS INFECTION: Primary | ICD-10-CM

## 2022-01-01 RX ORDER — METHYLPREDNISOLONE 4 MG/1
TABLET ORAL
Qty: 1 EACH | Refills: 0 | Status: SHIPPED | OUTPATIENT
Start: 2022-01-01 | End: 2022-02-17

## 2022-02-08 DIAGNOSIS — E78.49 OTHER HYPERLIPIDEMIA: ICD-10-CM

## 2022-02-08 RX ORDER — ATORVASTATIN CALCIUM 10 MG/1
10 TABLET, FILM COATED ORAL DAILY
Qty: 30 TABLET | Refills: 3 | Status: SHIPPED | OUTPATIENT
Start: 2022-02-08 | End: 2022-04-22 | Stop reason: SDUPTHER

## 2022-02-16 ENCOUNTER — DOCUMENTATION (OUTPATIENT)
Dept: INTERNAL MEDICINE | Facility: CLINIC | Age: 62
End: 2022-02-16

## 2022-02-16 RX ORDER — ONDANSETRON 8 MG/1
8 TABLET, ORALLY DISINTEGRATING ORAL EVERY 8 HOURS PRN
Qty: 21 TABLET | Refills: 0 | Status: SHIPPED | OUTPATIENT
Start: 2022-02-16 | End: 2022-04-22

## 2022-02-17 ENCOUNTER — OFFICE VISIT (OUTPATIENT)
Dept: INTERNAL MEDICINE | Facility: CLINIC | Age: 62
End: 2022-02-17

## 2022-02-17 VITALS
HEART RATE: 100 BPM | WEIGHT: 165 LBS | TEMPERATURE: 98 F | RESPIRATION RATE: 20 BRPM | SYSTOLIC BLOOD PRESSURE: 158 MMHG | DIASTOLIC BLOOD PRESSURE: 78 MMHG | BODY MASS INDEX: 28.32 KG/M2

## 2022-02-17 DIAGNOSIS — R50.9 FEVER, UNSPECIFIED FEVER CAUSE: ICD-10-CM

## 2022-02-17 DIAGNOSIS — K52.9 GASTROENTERITIS: Primary | ICD-10-CM

## 2022-02-17 LAB
EXPIRATION DATE: NORMAL
FLUAV AG NPH QL: NEGATIVE
FLUBV AG NPH QL: NEGATIVE
INTERNAL CONTROL: NORMAL
Lab: NORMAL

## 2022-02-17 PROCEDURE — 87804 INFLUENZA ASSAY W/OPTIC: CPT | Performed by: INTERNAL MEDICINE

## 2022-02-17 PROCEDURE — 99213 OFFICE O/P EST LOW 20 MIN: CPT | Performed by: INTERNAL MEDICINE

## 2022-02-17 PROCEDURE — U0004 COV-19 TEST NON-CDC HGH THRU: HCPCS | Performed by: INTERNAL MEDICINE

## 2022-02-17 RX ORDER — PROMETHAZINE HYDROCHLORIDE 25 MG/1
25 TABLET ORAL EVERY 6 HOURS PRN
Qty: 30 TABLET | Refills: 0 | Status: SHIPPED | OUTPATIENT
Start: 2022-02-17 | End: 2022-04-22

## 2022-02-17 RX ORDER — PROMETHAZINE HYDROCHLORIDE 25 MG/1
25 TABLET ORAL EVERY 6 HOURS PRN
Qty: 30 TABLET | Refills: 0 | Status: SHIPPED | OUTPATIENT
Start: 2022-02-17 | End: 2022-02-17 | Stop reason: SDUPTHER

## 2022-02-17 NOTE — PATIENT INSTRUCTIONS
Recommend clear liquids today, advance diet tomorrow to bland foods.  May use Imodium as needed for diarrhea.  
65 yr old male with hx of HTn only on metoprolol 100mg daily but ran out of it 2 days ago presents to ed c/o VÁZQUEZ while at [facility] at 730a.  pt states been tired since being home during pandemic.  today doing usual walking and c/o sob and weak. no fever, no chills, no visual changes, no headache, no numbness or tingling, no cough, no cp, no palpitations, no leg swelling, no syncope, no abd pain, no n/v/d, no dysuria, no rashes, no drug use.  increase weigh since pandemic, using normally 1-2 pillow    HTn emergency vs CHF vs acs vs PE vs anemia vs georgie - labs, cardiac monitor, d dimer, ekg, cxr, likely admit

## 2022-02-17 NOTE — PROGRESS NOTES
Subjective       Roz Randolph is a 61 y.o. female.     Chief Complaint   Patient presents with   • Vomiting   • Diarrhea   • Fever       History obtained from the patient.      Vomiting   This is a new problem. The current episode started yesterday. The problem occurs 5 to 10 times per day. Progression since onset: improved, no vomiting today. The emesis has an appearance of stomach contents. Maximum temperature: 101. The fever has been present for less than 1 day. Associated symptoms include abdominal pain (cramping), arthralgias, coughing (mild, chronic), diarrhea, dizziness, a fever, headaches and myalgias. Pertinent negatives include no chest pain or chills. Risk factors: None. Treatments tried: Zofran. The treatment provided no relief.   Diarrhea   This is a new problem. The current episode started today. Episode frequency: 5 episodes. The problem has been gradually improving. The stool consistency is described as watery. The patient states that diarrhea awakens her from sleep. Associated symptoms include abdominal pain (cramping), arthralgias, coughing (mild, chronic), a fever, headaches, myalgias and vomiting. Pertinent negatives include no chills. Nothing aggravates the symptoms. There are no known risk factors. She has tried nothing for the symptoms. There is no history of inflammatory bowel disease, irritable bowel syndrome or a recent abdominal surgery.   Fever   This is a new problem. The current episode started yesterday. The problem occurs intermittently. The problem has been gradually improving. Maximum temperature: 101. The temperature was taken using an oral thermometer. Associated symptoms include abdominal pain (cramping), coughing (mild, chronic), diarrhea, headaches, muscle aches, nausea and vomiting. Pertinent negatives include no chest pain, congestion, ear pain, rash, sore throat, urinary pain or wheezing. Associated symptoms comments: No loss of taste or smell. She has tried  acetaminophen and NSAIDs for the symptoms. The treatment provided significant relief.   Risk factors: no contaminated food, no contaminated water, no recent travel and no sick contacts       The patient states she was Covid positive on New Year's Emilee. There is no known COVID-19 or Influenza exposure. However, her  is home from the hospital after having a stroke and she would like to make sure she is okay. She is fully vaccinated from COVID-19, but not Influenza. She did take a home COVID test yesterday which was negative.    The following portions of the patient's history were reviewed and updated as appropriate: allergies, current medications, past family history, past medical history, past social history, past surgical history and problem list.      Review of Systems   Constitutional: Positive for appetite change (decreased), fatigue and fever. Negative for chills.   HENT: Negative for congestion, ear pain and sore throat.    Respiratory: Positive for cough (mild, chronic). Negative for shortness of breath and wheezing.    Cardiovascular: Negative for chest pain.   Gastrointestinal: Positive for abdominal pain (cramping), diarrhea, nausea and vomiting.   Genitourinary: Negative for decreased urine volume, dysuria, frequency, hematuria and urgency.   Musculoskeletal: Positive for arthralgias, myalgias and neck pain. Negative for neck stiffness.   Skin: Negative for rash.   Neurological: Positive for dizziness, light-headedness and headaches.   Hematological: Positive for adenopathy (sore).           Objective     Blood pressure 158/78, pulse 100, temperature 98 °F (36.7 °C), temperature source Temporal, resp. rate 20, weight 74.8 kg (165 lb).    Physical Exam  Vitals and nursing note reviewed.   Constitutional:       Appearance: She is well-developed.      Comments: Overweight.   HENT:      Head: Normocephalic and atraumatic.      Comments: No maxillary or frontal sinus tenderness to palpation.     Right  Ear: Tympanic membrane, ear canal and external ear normal.      Left Ear: Tympanic membrane, ear canal and external ear normal.      Mouth/Throat:      Mouth: Mucous membranes are moist. No oral lesions.      Pharynx: Oropharynx is clear.      Comments: Tonsils normal.  Eyes:      Conjunctiva/sclera: Conjunctivae normal.   Cardiovascular:      Rate and Rhythm: Normal rate and regular rhythm.      Heart sounds: No murmur heard.      Pulmonary:      Effort: Pulmonary effort is normal.      Breath sounds: Normal breath sounds.   Abdominal:      General: Bowel sounds are normal. There is no distension.      Palpations: Abdomen is soft. There is no hepatomegaly, splenomegaly or mass.      Tenderness: There is no abdominal tenderness. There is no right CVA tenderness or left CVA tenderness.   Musculoskeletal:      Cervical back: Normal range of motion and neck supple.   Lymphadenopathy:      Cervical: No cervical adenopathy.   Skin:     Findings: No rash.   Neurological:      Mental Status: She is alert.   Psychiatric:         Mood and Affect: Mood normal.       Results for orders placed or performed in visit on 02/17/22   POC Influenza A / B    Specimen: Swab   Result Value Ref Range    Rapid Influenza A Ag Negative Negative    Rapid Influenza B Ag Negative Negative    Internal Control Passed Passed    Lot Number [id number]     Expiration Date 05/11/2023          Assessment/Plan   Diagnoses and all orders for this visit:    1. Gastroenteritis (Primary)  -     promethazine (PHENERGAN) 25 MG tablet; Take 1 tablet by mouth Every 6 (Six) Hours As Needed for Nausea or Vomiting.  Dispense: 30 tablet; Refill: 0   Recommended clear liquids today, advance diet tomorrow to bland foods.     May use Imodium as needed for diarrhea.    2. Fever, unspecified fever cause  -     POC Influenza A / B  -     COVID-19 PCR, [facility], NP SWAB IN [facility]SP[facility]/ORAL SWISH 24-30 HR TAT - Swab, Nasopharynx        Return if symptoms  worsen or fail to improve.

## 2022-02-18 ENCOUNTER — TELEPHONE (OUTPATIENT)
Dept: INTERNAL MEDICINE | Facility: CLINIC | Age: 62
End: 2022-02-18

## 2022-02-18 LAB — SARS-COV-2 RNA NOSE QL NAA+PROBE: NOT DETECTED

## 2022-03-31 ENCOUNTER — TELEMEDICINE (OUTPATIENT)
Dept: INTERNAL MEDICINE | Facility: CLINIC | Age: 62
End: 2022-03-31

## 2022-03-31 DIAGNOSIS — J01.00 ACUTE MAXILLARY SINUSITIS, RECURRENCE NOT SPECIFIED: Primary | ICD-10-CM

## 2022-03-31 PROCEDURE — 99213 OFFICE O/P EST LOW 20 MIN: CPT | Performed by: NURSE PRACTITIONER

## 2022-03-31 RX ORDER — METHYLPREDNISOLONE 4 MG/1
TABLET ORAL
Qty: 21 TABLET | Refills: 0 | Status: SHIPPED | OUTPATIENT
Start: 2022-03-31 | End: 2022-04-22

## 2022-03-31 RX ORDER — CEFDINIR 300 MG/1
300 CAPSULE ORAL 2 TIMES DAILY
Qty: 20 CAPSULE | Refills: 0 | Status: SHIPPED | OUTPATIENT
Start: 2022-03-31 | End: 2022-04-22

## 2022-03-31 NOTE — PROGRESS NOTES
Patient Name: Roz Randolph  : 1960   MRN: [id number]     Chief Complaint:  Sinus pain    History of Present Illness: Roz Randolph is a 61 y.o. female presents for telehealth for sinus pain.   You have chosen to receive care through a telehealth visit. Patient consents to video/audio connection for your medical care today.   This visit completed via [employer].     Patient has c/o sinus pain under both eyes radiates to teeth since 5 days ago. She has nasal drainage,cough,  headache, and itchy eyes. She has had two negative covid tests. Patient has tried allegra daily without relief. Denies wheezing or shortness of breath. Her symptoms have worsened.     Subjective     Review of System: Review of Systems   Constitutional: Negative for chills, diaphoresis, fatigue and fever.   HENT: Positive for congestion, rhinorrhea, sinus pressure and sinus pain. Negative for ear pain, mouth sores, postnasal drip, sneezing and sore throat.    Eyes: Positive for discharge (clear) and itching. Negative for visual disturbance.   Respiratory: Positive for cough. Negative for shortness of breath and wheezing.    Gastrointestinal: Negative for abdominal pain, diarrhea, nausea and vomiting.   Musculoskeletal: Negative for arthralgias and myalgias.   Skin: Negative for color change.   Neurological: Positive for headaches. Negative for dizziness and weakness.   Hematological: Negative for adenopathy.   Psychiatric/Behavioral: Negative for dysphoric mood. The patient is not nervous/anxious.         Medications:     Current Outpatient Medications:   •  atorvastatin (LIPITOR) 10 MG tablet, Take 1 tablet by mouth Daily., Disp: 30 tablet, Rfl: 3  •  buPROPion XL (WELLBUTRIN XL) 150 MG 24 hr tablet, Take 1 tablet by mouth Every Morning., Disp: 90 tablet, Rfl: 1  •  cefdinir (OMNICEF) 300 MG capsule, Take 1 capsule by mouth 2 (Two) Times a Day., Disp: 20 capsule, Rfl: 0  •  Cholecalciferol (VITAMIN D3) 25 MCG (1000 UT) capsule,  Take  by mouth., Disp: , Rfl:   •  levothyroxine (SYNTHROID, LEVOTHROID) 88 MCG tablet, Take 1 tablet by mouth Daily., Disp: 90 tablet, Rfl: 1  •  losartan-hydrochlorothiazide (Hyzaar) 100-25 MG per tablet, Take 1 tablet by mouth Daily., Disp: 90 tablet, Rfl: 1  •  methylPREDNISolone (MEDROL) 4 MG dose pack, Take as directed on package instructions., Disp: 21 tablet, Rfl: 0  •  ondansetron ODT (Zofran ODT) 8 MG disintegrating tablet, Place 1 tablet on the tongue Every 8 (Eight) Hours As Needed for Nausea or Vomiting., Disp: 21 tablet, Rfl: 0  •  promethazine (PHENERGAN) 25 MG tablet, Take 1 tablet by mouth Every 6 (Six) Hours As Needed for Nausea or Vomiting., Disp: 30 tablet, Rfl: 0    Allergies:   Allergies   Allergen Reactions   • Augmentin [Amoxicillin-Pot Clavulanate] Rash   • Morphine And Related Rash   • Tetracyclines & Related Rash, GI Intolerance and Swelling       Objective     Physical Exam:   Vital Signs: There were no vitals filed for this visit.      Physical Exam  Constitutional:       Appearance: She is not ill-appearing.   Pulmonary:      Effort: Pulmonary effort is normal.   Neurological:      Mental Status: She is alert.   Psychiatric:         Mood and Affect: Mood normal.         Assessment / Plan      Assessment/Plan:   Diagnoses and all orders for this visit:    1. Acute maxillary sinusitis, recurrence not specified (Primary)  -     cefdinir (OMNICEF) 300 MG capsule; Take 1 capsule by mouth 2 (Two) Times a Day.  Dispense: 20 capsule; Refill: 0  -     methylPREDNISolone (MEDROL) 4 MG dose pack; Take as directed on package instructions.  Dispense: 21 tablet; Refill: 0         I discussed that assessment is limited due to video visit and if patient does not improve they would need to come into the clinic for evaluation.  If any problems or concerns follow-up in clinic.  Patient verbalized understanding.  ER with any shortness of breath or emergencies.    11 min visit     Follow Up:   Return if  symptoms worsen or fail to improve.    CONNIE Baird  [facility]

## 2022-04-22 ENCOUNTER — OFFICE VISIT (OUTPATIENT)
Dept: INTERNAL MEDICINE | Facility: CLINIC | Age: 62
End: 2022-04-22

## 2022-04-22 ENCOUNTER — LAB (OUTPATIENT)
Dept: LAB | Facility: HOSPITAL | Age: 62
End: 2022-04-22

## 2022-04-22 VITALS
SYSTOLIC BLOOD PRESSURE: 140 MMHG | WEIGHT: 167 LBS | DIASTOLIC BLOOD PRESSURE: 62 MMHG | TEMPERATURE: 98 F | HEART RATE: 88 BPM | RESPIRATION RATE: 20 BRPM | BODY MASS INDEX: 28.67 KG/M2

## 2022-04-22 DIAGNOSIS — H10.33 ACUTE CONJUNCTIVITIS OF BOTH EYES, UNSPECIFIED ACUTE CONJUNCTIVITIS TYPE: ICD-10-CM

## 2022-04-22 DIAGNOSIS — F41.1 GENERALIZED ANXIETY DISORDER: ICD-10-CM

## 2022-04-22 DIAGNOSIS — Z23 NEED FOR COVID-19 VACCINE: ICD-10-CM

## 2022-04-22 DIAGNOSIS — I10 PRIMARY HYPERTENSION: Primary | ICD-10-CM

## 2022-04-22 DIAGNOSIS — E03.9 ACQUIRED HYPOTHYROIDISM: ICD-10-CM

## 2022-04-22 DIAGNOSIS — E55.9 VITAMIN D DEFICIENCY: ICD-10-CM

## 2022-04-22 DIAGNOSIS — Z12.31 ENCOUNTER FOR SCREENING MAMMOGRAM FOR BREAST CANCER: ICD-10-CM

## 2022-04-22 DIAGNOSIS — E78.49 OTHER HYPERLIPIDEMIA: ICD-10-CM

## 2022-04-22 DIAGNOSIS — K21.9 GASTROESOPHAGEAL REFLUX DISEASE, UNSPECIFIED WHETHER ESOPHAGITIS PRESENT: ICD-10-CM

## 2022-04-22 LAB
ALBUMIN SERPL-MCNC: 4.7 G/DL (ref 3.5–5.2)
ALBUMIN/GLOB SERPL: 1.6 G/DL
ALP SERPL-CCNC: 47 U/L (ref 39–117)
ALT SERPL W P-5'-P-CCNC: 74 U/L (ref 1–33)
ANION GAP SERPL CALCULATED.3IONS-SCNC: 15.3 MMOL/L (ref 5–15)
AST SERPL-CCNC: 89 U/L (ref 1–32)
BASOPHILS # BLD AUTO: 0.05 10*3/MM3 (ref 0–0.2)
BASOPHILS NFR BLD AUTO: 0.6 % (ref 0–1.5)
BILIRUB SERPL-MCNC: 0.7 MG/DL (ref 0–1.2)
BUN SERPL-MCNC: 13 MG/DL (ref 6–20)
BUN/CREAT SERPL: 15.5 (ref 7–25)
CALCIUM SPEC-SCNC: 9.3 MG/DL (ref 8.6–10.5)
CHLORIDE SERPL-SCNC: 103 MMOL/L (ref 98–107)
CHOLEST SERPL-MCNC: 253 MG/DL (ref 0–200)
CO2 SERPL-SCNC: 22.7 MMOL/L (ref 22–29)
CREAT SERPL-MCNC: 0.84 MG/DL (ref 0.57–1)
DEPRECATED RDW RBC AUTO: 42.1 FL (ref 37–54)
EGFRCR SERPLBLD CKD-EPI 2021: 80.2 ML/MIN/1.73
EOSINOPHIL # BLD AUTO: 0.54 10*3/MM3 (ref 0–0.4)
EOSINOPHIL NFR BLD AUTO: 6.9 % (ref 0.3–6.2)
ERYTHROCYTE [DISTWIDTH] IN BLOOD BY AUTOMATED COUNT: 11.5 % (ref 12.3–15.4)
GLOBULIN UR ELPH-MCNC: 2.9 GM/DL
GLUCOSE SERPL-MCNC: 94 MG/DL (ref 65–99)
HCT VFR BLD AUTO: 41.3 % (ref 34–46.6)
HDLC SERPL-MCNC: 64 MG/DL (ref 40–60)
HGB BLD-MCNC: 14.4 G/DL (ref 12–15.9)
IMM GRANULOCYTES # BLD AUTO: 0.03 10*3/MM3 (ref 0–0.05)
IMM GRANULOCYTES NFR BLD AUTO: 0.4 % (ref 0–0.5)
LDLC SERPL CALC-MCNC: 160 MG/DL (ref 0–100)
LDLC/HDLC SERPL: 2.45 {RATIO}
LYMPHOCYTES # BLD AUTO: 1.87 10*3/MM3 (ref 0.7–3.1)
LYMPHOCYTES NFR BLD AUTO: 23.8 % (ref 19.6–45.3)
MCH RBC QN AUTO: 34.9 PG (ref 26.6–33)
MCHC RBC AUTO-ENTMCNC: 34.9 G/DL (ref 31.5–35.7)
MCV RBC AUTO: 100 FL (ref 79–97)
MONOCYTES # BLD AUTO: 0.61 10*3/MM3 (ref 0.1–0.9)
MONOCYTES NFR BLD AUTO: 7.8 % (ref 5–12)
NEUTROPHILS NFR BLD AUTO: 4.76 10*3/MM3 (ref 1.7–7)
NEUTROPHILS NFR BLD AUTO: 60.5 % (ref 42.7–76)
NRBC BLD AUTO-RTO: 0 /100 WBC (ref 0–0.2)
PLATELET # BLD AUTO: 235 10*3/MM3 (ref 140–450)
PMV BLD AUTO: 10.7 FL (ref 6–12)
POTASSIUM SERPL-SCNC: 3.7 MMOL/L (ref 3.5–5.2)
PROT SERPL-MCNC: 7.6 G/DL (ref 6–8.5)
RBC # BLD AUTO: 4.13 10*6/MM3 (ref 3.77–5.28)
SODIUM SERPL-SCNC: 141 MMOL/L (ref 136–145)
TRIGL SERPL-MCNC: 162 MG/DL (ref 0–150)
TSH SERPL DL<=0.05 MIU/L-ACNC: 2.95 UIU/ML (ref 0.27–4.2)
VLDLC SERPL-MCNC: 29 MG/DL (ref 5–40)
WBC NRBC COR # BLD: 7.86 10*3/MM3 (ref 3.4–10.8)

## 2022-04-22 PROCEDURE — 80061 LIPID PANEL: CPT | Performed by: INTERNAL MEDICINE

## 2022-04-22 PROCEDURE — 80050 GENERAL HEALTH PANEL: CPT | Performed by: INTERNAL MEDICINE

## 2022-04-22 PROCEDURE — 91305 COVID-19 (PFIZER) 12+ YRS: CPT | Performed by: INTERNAL MEDICINE

## 2022-04-22 PROCEDURE — 0054A COVID-19 (PFIZER) 12+ YRS: CPT | Performed by: INTERNAL MEDICINE

## 2022-04-22 PROCEDURE — 99214 OFFICE O/P EST MOD 30 MIN: CPT | Performed by: INTERNAL MEDICINE

## 2022-04-22 RX ORDER — LOSARTAN POTASSIUM AND HYDROCHLOROTHIAZIDE 12.5; 1 MG/1; MG/1
TABLET ORAL
COMMUNITY
Start: 2022-02-28 | End: 2022-04-22 | Stop reason: DRUGHIGH

## 2022-04-22 RX ORDER — SULFACETAMIDE SODIUM 100 MG/ML
1 SOLUTION/ DROPS OPHTHALMIC
Qty: 10 ML | Refills: 0 | Status: SHIPPED | OUTPATIENT
Start: 2022-04-22 | End: 2022-04-29

## 2022-04-22 RX ORDER — HYDROCHLOROTHIAZIDE 12.5 MG/1
TABLET ORAL
COMMUNITY
Start: 2022-02-28 | End: 2022-04-22

## 2022-04-22 RX ORDER — ATORVASTATIN CALCIUM 10 MG/1
10 TABLET, FILM COATED ORAL DAILY
Qty: 30 TABLET | Refills: 3 | Status: SHIPPED | OUTPATIENT
Start: 2022-04-22 | End: 2022-09-23

## 2022-04-22 NOTE — PROGRESS NOTES
Subjective       Roz Randolph is a 61 y.o. female.     Chief Complaint   Patient presents with   • Hypertension     6 month follow up fasting        History obtained from the patient.      History of Present Illness     The patient reports allergy symptoms.  She states her worst symptoms are bilateral eye pain with watery/yellow drainage from the eyes.  She has tried Zyrtec, Allegra, and Pataday drops without relief.    [facility]: The patient is here today for a follow-up visit.  She was last seen 8/25/21.  Her Hypertension has been stable.   Medication(s): Losartan HCTZ.   Her Hyperlipidemia has been unstable, but improved.   Her LDL goal is < 130,  last LDL was 103, TG 183.   Medication(s): Atorvastatin  The patient is mostly adherent with her medication regimen.   She denies medication side effects.     Procedures: On 3/28/2022, Coronary Artery Calcium Score was 2.      Interval Events: The patient reports being off her Atorvastatin for several weeks because she forgot to get it filled.  The patient states her blood pressure at home has been 130's / 80's.  She gets nervous at the doctor's office.  She also states she is under increased stress, due to her 's recent stroke.     Symptoms:  Denies chest pain, dyspnea, PORTILLO, orthopnea, PND, palpitations, syncope, lower extremity edema, claudication, lightheadedness, and dizziness.  Associated Symptoms: Weight decreased 2 pounds since 8/25/21. Stable fatigue. No headache, polydipsia, polyuria, myalgias, arthralgias, memory loss, concentration problems, or focal neurologic deficits.      Lifestyle and Disease Management: Diet: She consumes a diverse and healthy diet, overall. Exercise: She walks a lot at work.  Tobacco Use: Never a Smoker.      Hypothyroidism Follow-Up: The patient is being seen for follow-up of Hypothyroidism, which is stable.   Interval Events: On 8/25/21, TSH and T4 were normal.  Symptoms:  Weight  decreased 2 pounds since 8/25/21.  Denies fatigue, heat / cold intolerance, constipation, diarrhea, memory loss, trouble concentrating, hair loss, and dry skin.   Associated Symptoms: no myalgias, arthralgias, or paresthesias.   Medications:  Levothyroxine (Synthroid).       GERD Follow-up: The patient is being seen for a routine clinic follow-up of Gastroesophageal Reflux Disease, which is stable.  Procedures:  EGD 12/21/15 (small hiatal hernia, mild duodenitis, and concentric espohageal rings suggestive of esophsgitis- path c/w mild chronic esophagitis w/ increased eosinophils suggestive of reflux ([place]).   Comorbid Illness: On 6/16/2020, liver enzymes were elevated ( and ALT 87). Elevated liver panel done on 11/19/2020 was significant for an elevated ARSH, GGT (249), Ferritin (778), ALT (80),  and AST (98 ).  Liver ultrasound was ordered on 1/25/2021, but not done.  She saw Dr. Masters on 1/28/2021.  He felt her ARSH was a false positive.  He ordered additional labs, but the patient states she forgot about them.  These labs were done on 8/25/2021 and were negative with the exception of an elevated AST (57) and ALT (68).  Interval Events:  None.  Symptoms:  No abdominal pain, heartburn, acid regurgitation, nausea, vomiting, dysphagia, odynophagia, hematemesis, hematochezia, melena, early satiety, belching, and bloating.  Associated Symptoms: Reports a chronic cough.  No chronic sore throat, hoarseness, or wheezing.   Medication: Tums prn.      Vitamin D Deficiency Follow-Up: The patient is being seen for follow-up of Vitamin D Deficiency, which is stable.    Interval Events: Vitamin D level on 8/25/2021 was 31.1.   Symptoms:  No fatigue,  myalgias, arthralgias, paresthesias, balance issues, or gait abnormality.  Medication:  Vitamin D3 (cholecalciferol) 2000 IU daily.      Anxiety Disorder Follow-Up: The patient is being seen for follow-up of Anxiety, which is stable.  Comorbid Illnesses None.   Interval  Events: She has increased stress due to her 's recent stroke.    Symptoms: Stable anxiety.  She has had sleep disruption due to taking care of her .  Denies depression, panic attacks,  anhedonia, memory loss, and difficulty concentrating.  Associated Symptoms: no suicidal ideation or thoughts of self-harm.   Medication(s): Wellbutrin XL.   The patient is adherent to her medication regimen, and she denies medication side effects.        Current Outpatient Medications on File Prior to Visit   Medication Sig Dispense Refill   • buPROPion XL (WELLBUTRIN XL) 150 MG 24 hr tablet Take 1 tablet by mouth Every Morning. 90 tablet 1   • Cholecalciferol (VITAMIN D3) 25 MCG (1000 UT) capsule Take  by mouth.     • levothyroxine (SYNTHROID, LEVOTHROID) 88 MCG tablet Take 1 tablet by mouth Daily. 90 tablet 1   • losartan-hydrochlorothiazide (Hyzaar) 100-25 MG per tablet Take 1 tablet by mouth Daily. 90 tablet 1   • [DISCONTINUED] atorvastatin (LIPITOR) 10 MG tablet Take 1 tablet by mouth Daily. 30 tablet 3   • [DISCONTINUED] cefdinir (OMNICEF) 300 MG capsule Take 1 capsule by mouth 2 (Two) Times a Day. 20 capsule 0   • [DISCONTINUED] hydroCHLOROthiazide (HYDRODIURIL) 12.5 MG tablet      • [DISCONTINUED] losartan-hydrochlorothiazide (HYZAAR) 100-12.5 MG per tablet      • [DISCONTINUED] methylPREDNISolone (MEDROL) 4 MG dose pack Take as directed on package instructions. 21 tablet 0   • [DISCONTINUED] ondansetron ODT (Zofran ODT) 8 MG disintegrating tablet Place 1 tablet on the tongue Every 8 (Eight) Hours As Needed for Nausea or Vomiting. 21 tablet 0   • [DISCONTINUED] promethazine (PHENERGAN) 25 MG tablet Take 1 tablet by mouth Every 6 (Six) Hours As Needed for Nausea or Vomiting. 30 tablet 0     No current facility-administered medications on file prior to visit.       Current outpatient and discharge medications have been reconciled for the patient.  Reviewed by: Maia Melton MD        The following portions of the  patient's history were reviewed and updated as appropriate: allergies, current medications, past family history, past medical history, past social history, past surgical history and problem list.    Review of Systems   Constitutional: Negative for fatigue and unexpected weight change.   Eyes: Negative for visual disturbance.   Respiratory: Positive for cough. Negative for shortness of breath and wheezing.    Cardiovascular: Negative for chest pain, palpitations and leg swelling.        No PORTILLO, orthopnea, or claudication.   Gastrointestinal: Negative for abdominal pain, blood in stool, constipation, diarrhea, nausea and vomiting.        Denies melena.   Endocrine: Negative for polydipsia and polyuria.   Musculoskeletal: Negative for arthralgias and myalgias.   Neurological: Negative for dizziness, syncope, light-headedness and headaches.        No memory issues.   Psychiatric/Behavioral: Positive for sleep disturbance. Negative for decreased concentration, self-injury and suicidal ideas. The patient is nervous/anxious.          Objective       Blood pressure 140/62, pulse 88, temperature 98 °F (36.7 °C), temperature source Temporal, resp. rate 20, weight 75.8 kg (167 lb).  Body mass index is 28.67 kg/m².      Physical Exam  Vitals and nursing note reviewed.   Constitutional:       Appearance: She is well-developed and normal weight.   Eyes:      General:         Right eye: No discharge.         Left eye: No discharge.      Conjunctiva/sclera:      Right eye: Right conjunctiva is injected.      Left eye: Left conjunctiva is injected.   Neck:      Thyroid: No thyroid mass or thyromegaly.      Vascular: No carotid bruit.   Cardiovascular:      Rate and Rhythm: Normal rate and regular rhythm.      Pulses: Normal pulses.      Heart sounds: Normal heart sounds. No murmur heard.    No friction rub. No gallop.   Pulmonary:      Effort: Pulmonary effort is normal.      Breath sounds: Normal breath sounds.   Musculoskeletal:       Right lower leg: No edema.      Left lower leg: No edema.   Neurological:      Mental Status: She is alert.   Psychiatric:         Mood and Affect: Mood normal.         Assessment / Plan:  Diagnoses and all orders for this visit:    1. Primary hypertension (Primary)  -     Lipid Panel  -     Comprehensive Metabolic Panel  -     TSH  -     CBC & Differential   Continue current medication(s) as noted in the history of present illness.    2. Other hyperlipidemia  -     atorvastatin (LIPITOR) 10 MG tablet; Take 1 tablet by mouth Daily.  Dispense: 30 tablet; Refill: 3- REFILL  -     Lipid Panel  -     Comprehensive Metabolic Panel  -     TSH  -     CBC & Differential   Continue current medication(s) as noted in the history of present illness.    3. Acquired hypothyroidism  -     TSH   Continue current medication(s) as noted in the history of present illness.    4. Gastroesophageal reflux disease, unspecified whether esophagitis present   Continue current medication(s) as noted in the history of present illness.    5. Vitamin D deficiency   Continue Vitamin D supplementation.    6. Generalized anxiety disorder   Continue current medication(s) as noted in the history of present illness.    7. Acute conjunctivitis of both eyes, unspecified acute conjunctivitis type  -     sulfacetamide (Bleph-10) 10 % ophthalmic solution; Administer 1 drop to both eyes Every 3 (Three) Hours for 7 days.  Dispense: 10 mL; Refill: 0   For the allergy symptoms, I recommend Xyzal and Flonase over-the-counter.      8. Encounter for screening mammogram for breast cancer  -     Mammo Screening Digital Tomosynthesis Bilateral With CAD; Future    9. Need for COVID-19 vaccine  -     COVID-19 Vaccine (Pfizer) Marcus Cap      I recommended Shingrix (new Shingles vaccine) at the pharmacy.        Return in about 6 months (around 10/22/2022) for Annual physical, fasting.

## 2022-04-22 NOTE — PATIENT INSTRUCTIONS
For the allergy symptoms, I recommend Xyzal and Flonase over-the-counter.      I recommend Shingrix (new Shingles vaccine) at the pharmacy.

## 2022-05-03 ENCOUNTER — TELEMEDICINE (OUTPATIENT)
Dept: INTERNAL MEDICINE | Facility: CLINIC | Age: 62
End: 2022-05-03

## 2022-05-03 DIAGNOSIS — R21 RASH: Primary | ICD-10-CM

## 2022-05-03 PROCEDURE — 99213 OFFICE O/P EST LOW 20 MIN: CPT | Performed by: NURSE PRACTITIONER

## 2022-05-03 RX ORDER — METHYLPREDNISOLONE 4 MG/1
TABLET ORAL
Qty: 1 EACH | Refills: 0 | Status: SHIPPED | OUTPATIENT
Start: 2022-05-03 | End: 2022-12-12

## 2022-05-03 RX ORDER — ACYCLOVIR 800 MG/1
800 TABLET ORAL
Qty: 35 TABLET | Refills: 0 | Status: SHIPPED | OUTPATIENT
Start: 2022-05-03

## 2022-05-03 NOTE — PROGRESS NOTES
Chief Complaint  Rash    Subjective          Roz Randolph presents to [facility]  History of Present Illness  This was an audio and video enabled telemedicine encounter.    You have chosen to receive care through a telehealth visit.  Do you consent to use a video/audio connection for your medical care today? Yes    Unspecified rash  The patient present, by video, and states that she may be experiencing symptoms related to shingles that she has had in the past. She states that it started as a small rash on her neck and has progressed to a more defined area. The patient states when she had shingles in the past, it was localized on her neck, and this time it is bilateral. Her symptoms include pruritus and a stinging pain. She states that her pain level with this outbreak is not as severe as the pain she felt when she had shingles in the past. The patient denies any weeping or discharge from the areas and states that she has been staying away from touching the area. 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 The patient states that she makes direct contact with the children and some may not have received their shingles or chickenpox vaccination due to their age. Her main concern is that she is not contagious since she does work with children, and her  just had a cerebrovascular accident. The patient confirms her work requires immunizations to be up to date. The patient states that she had her COVID-19 booster vaccination 2 weeks ago.    Allergies  The patient states that she was experiencing symptoms of pruritus and redness of her eyes a few weeks ago that progressed to feeling worse this past weekend. Her symptoms were exacerbated by the antibiotic eye drops she received from the doctor because it made her symptoms worse. She states that she discontinued the antibiotic and used Pataday, which  "alleviated some of her symptoms. The patient states that she had \"bruised eyes\" and woke up scratching them. She states that they are better today.    Review of Systems  No new chest pain, shortness of breath, headaches, visual changes, dizziness, palpitations, syncope, swelling in the lower extremities, or shortness of breath when laying down. No abdominal pain, constipation, hematuria or hematochezia, or dysuria. No new lumps, bumps. Positive for rash. Positive for watery, injected eyes.     Objective   Vital Signs:   There were no vitals taken for this visit.    Physical Exam  Constitutional:       General: She is not in acute distress.     Appearance: Normal appearance. She is not ill-appearing.   HENT:      Nose: No rhinorrhea.      Mouth/Throat:      Pharynx: Oropharynx is clear. No posterior oropharyngeal erythema.   Eyes:      General:         Right eye: No discharge.         Left eye: No discharge.      Conjunctiva/sclera: Conjunctivae normal.   Pulmonary:      Effort: Pulmonary effort is normal.   Skin:     Findings: Rash present.      Comments: Posterior neck with erythema difficult to see more specific due to video visit and camera deficiency   Neurological:      Mental Status: She is alert and oriented to person, place, and time.   Psychiatric:         Mood and Affect: Mood normal.         Behavior: Behavior normal.         Thought Content: Thought content normal.        Result Review :                 Assessment and Plan    Diagnoses and all orders for this visit:    1. Rash (Primary)  Assessment & Plan:  - I will send a prescription to the pharmacy for Valtrex and a steroid dose pack.  - I have advised the patient to apply topical Benadryl or cortisone to the area.  - We discussed for the patient to be on the safe side and to stay away from the children or anyone who are not immunized against varicella.  - I have discussed with the patient that if that area is shingles and it starts to burn and hurt " more over the next week or so or beyond to reach out to me. There is some other management that we would do for the pain.  - We discussed the likelihood of her COVID-19 booster having a side effect of shingles.    Orders:  -     methylPREDNISolone (MEDROL) 4 MG dose pack; Take as directed on package instructions.  Dispense: 1 each; Refill: 0  -     acyclovir (Zovirax) 800 MG tablet; Take 1 tablet by mouth 5 (Five) Times a Day. Take no more than 5 doses a day.  Dispense: 35 tablet; Refill: 0        Video visit 12 minutes    Follow Up   Return if symptoms worsen or fail to improve.  Patient was given instructions and counseling regarding her condition or for health maintenance advice. Please see specific information pulled into the AVS if appropriate.     RTC/call  If symptoms worsen  Meds MOA and SE's reviewed and pt v/u    Transcribed from ambient dictation for CONNIE Jane by Catina Orellana.  05/03/22   18:29 EDT    Patient verbalized consent to the visit recording.

## 2022-05-03 NOTE — ASSESSMENT & PLAN NOTE
- I will send a prescription to the pharmacy for Valtrex and a steroid dose pack.  - I have advised the patient to apply topical Benadryl or cortisone to the area.  - We discussed for the patient to be on the safe side and to stay away from the children or anyone who are not immunized against varicella.  - I have discussed with the patient that if that area is shingles and it starts to burn and hurt more over the next week or so or beyond to reach out to me. There is some other management that we would do for the pain.  - We discussed the likelihood of her COVID-19 booster having a side effect of shingles.

## 2022-06-08 DIAGNOSIS — E03.9 ACQUIRED HYPOTHYROIDISM: ICD-10-CM

## 2022-06-08 RX ORDER — LEVOTHYROXINE SODIUM 88 UG/1
88 TABLET ORAL DAILY
Qty: 90 TABLET | Refills: 1 | Status: SHIPPED | OUTPATIENT
Start: 2022-06-08 | End: 2022-12-19

## 2022-06-09 DIAGNOSIS — F41.1 GENERALIZED ANXIETY DISORDER: ICD-10-CM

## 2022-06-09 DIAGNOSIS — I10 ESSENTIAL HYPERTENSION: ICD-10-CM

## 2022-06-09 RX ORDER — BUPROPION HYDROCHLORIDE 150 MG/1
150 TABLET ORAL EVERY MORNING
Qty: 90 TABLET | Refills: 1 | Status: SHIPPED | OUTPATIENT
Start: 2022-06-09 | End: 2022-12-14

## 2022-06-09 RX ORDER — LOSARTAN POTASSIUM AND HYDROCHLOROTHIAZIDE 25; 100 MG/1; MG/1
1 TABLET ORAL DAILY
Qty: 90 TABLET | Refills: 1 | Status: SHIPPED | OUTPATIENT
Start: 2022-06-09 | End: 2022-12-14

## 2022-06-19 ENCOUNTER — APPOINTMENT (OUTPATIENT)
Dept: CT IMAGING | Facility: HOSPITAL | Age: 62
End: 2022-06-19

## 2022-06-19 ENCOUNTER — HOSPITAL ENCOUNTER (EMERGENCY)
Facility: HOSPITAL | Age: 62
Discharge: HOME | End: 2022-06-19
Attending: EMERGENCY MEDICINE | Admitting: EMERGENCY MEDICINE

## 2022-06-19 VITALS
RESPIRATION RATE: 17 BRPM | BODY MASS INDEX: 28.17 KG/M2 | SYSTOLIC BLOOD PRESSURE: 156 MMHG | WEIGHT: 165 LBS | OXYGEN SATURATION: 97 % | HEIGHT: 64 IN | HEART RATE: 100 BPM | DIASTOLIC BLOOD PRESSURE: 91 MMHG | TEMPERATURE: 98.2 F

## 2022-06-19 DIAGNOSIS — K57.92 DIVERTICULITIS: Primary | ICD-10-CM

## 2022-06-19 LAB
ALBUMIN SERPL-MCNC: 4.4 G/DL (ref 3.5–5.2)
ALBUMIN/GLOB SERPL: 1.5 G/DL
ALP SERPL-CCNC: 54 U/L (ref 39–117)
ALT SERPL W P-5'-P-CCNC: 70 U/L (ref 1–33)
ANION GAP SERPL CALCULATED.3IONS-SCNC: 13 MMOL/L (ref 5–15)
AST SERPL-CCNC: 53 U/L (ref 1–32)
BASOPHILS # BLD AUTO: 0.03 10*3/MM3 (ref 0–0.2)
BASOPHILS NFR BLD AUTO: 0.4 % (ref 0–1.5)
BILIRUB SERPL-MCNC: 0.5 MG/DL (ref 0–1.2)
BILIRUB UR QL STRIP: NEGATIVE
BUN SERPL-MCNC: 13 MG/DL (ref 6–20)
BUN/CREAT SERPL: 18.3 (ref 7–25)
CALCIUM SPEC-SCNC: 9.5 MG/DL (ref 8.6–10.5)
CHLORIDE SERPL-SCNC: 104 MMOL/L (ref 98–107)
CLARITY UR: CLEAR
CO2 SERPL-SCNC: 27 MMOL/L (ref 22–29)
COLOR UR: YELLOW
CREAT SERPL-MCNC: 0.71 MG/DL (ref 0.57–1)
D-LACTATE SERPL-SCNC: 1.4 MMOL/L (ref 0.5–2)
DEPRECATED RDW RBC AUTO: 44.9 FL (ref 37–54)
EGFRCR SERPLBLD CKD-EPI 2021: 98.1 ML/MIN/1.73
EOSINOPHIL # BLD AUTO: 0.39 10*3/MM3 (ref 0–0.4)
EOSINOPHIL NFR BLD AUTO: 4.7 % (ref 0.3–6.2)
ERYTHROCYTE [DISTWIDTH] IN BLOOD BY AUTOMATED COUNT: 12.2 % (ref 12.3–15.4)
GLOBULIN UR ELPH-MCNC: 2.9 GM/DL
GLUCOSE SERPL-MCNC: 96 MG/DL (ref 65–99)
GLUCOSE UR STRIP-MCNC: NEGATIVE MG/DL
HCT VFR BLD AUTO: 40.1 % (ref 34–46.6)
HGB BLD-MCNC: 14 G/DL (ref 12–15.9)
HGB UR QL STRIP.AUTO: NEGATIVE
HOLD SPECIMEN: NORMAL
IMM GRANULOCYTES # BLD AUTO: 0.04 10*3/MM3 (ref 0–0.05)
IMM GRANULOCYTES NFR BLD AUTO: 0.5 % (ref 0–0.5)
KETONES UR QL STRIP: NEGATIVE
LEUKOCYTE ESTERASE UR QL STRIP.AUTO: NEGATIVE
LIPASE SERPL-CCNC: 20 U/L (ref 13–60)
LYMPHOCYTES # BLD AUTO: 2.22 10*3/MM3 (ref 0.7–3.1)
LYMPHOCYTES NFR BLD AUTO: 26.8 % (ref 19.6–45.3)
MCH RBC QN AUTO: 34.9 PG (ref 26.6–33)
MCHC RBC AUTO-ENTMCNC: 34.9 G/DL (ref 31.5–35.7)
MCV RBC AUTO: 100 FL (ref 79–97)
MONOCYTES # BLD AUTO: 0.78 10*3/MM3 (ref 0.1–0.9)
MONOCYTES NFR BLD AUTO: 9.4 % (ref 5–12)
NEUTROPHILS NFR BLD AUTO: 4.81 10*3/MM3 (ref 1.7–7)
NEUTROPHILS NFR BLD AUTO: 58.2 % (ref 42.7–76)
NITRITE UR QL STRIP: NEGATIVE
NRBC BLD AUTO-RTO: 0 /100 WBC (ref 0–0.2)
PH UR STRIP.AUTO: 7.5 [PH] (ref 5–8)
PLATELET # BLD AUTO: 249 10*3/MM3 (ref 140–450)
PMV BLD AUTO: 10.4 FL (ref 6–12)
POTASSIUM SERPL-SCNC: 3.8 MMOL/L (ref 3.5–5.2)
PROT SERPL-MCNC: 7.3 G/DL (ref 6–8.5)
PROT UR QL STRIP: NEGATIVE
RBC # BLD AUTO: 4.01 10*6/MM3 (ref 3.77–5.28)
SODIUM SERPL-SCNC: 144 MMOL/L (ref 136–145)
SP GR UR STRIP: 1.02 (ref 1–1.03)
UROBILINOGEN UR QL STRIP: NORMAL
WBC NRBC COR # BLD: 8.27 10*3/MM3 (ref 3.4–10.8)
WHOLE BLOOD HOLD COAG: NORMAL
WHOLE BLOOD HOLD SPECIMEN: NORMAL

## 2022-06-19 PROCEDURE — 25010000002 IOPAMIDOL 61 % SOLUTION: Performed by: EMERGENCY MEDICINE

## 2022-06-19 PROCEDURE — 96374 THER/PROPH/DIAG INJ IV PUSH: CPT

## 2022-06-19 PROCEDURE — 74177 CT ABD & PELVIS W/CONTRAST: CPT

## 2022-06-19 PROCEDURE — 80053 COMPREHEN METABOLIC PANEL: CPT | Performed by: EMERGENCY MEDICINE

## 2022-06-19 PROCEDURE — 25010000002 ONDANSETRON PER 1 MG: Performed by: EMERGENCY MEDICINE

## 2022-06-19 PROCEDURE — 85025 COMPLETE CBC W/AUTO DIFF WBC: CPT | Performed by: EMERGENCY MEDICINE

## 2022-06-19 PROCEDURE — 99283 EMERGENCY DEPT VISIT LOW MDM: CPT

## 2022-06-19 PROCEDURE — 83690 ASSAY OF LIPASE: CPT | Performed by: EMERGENCY MEDICINE

## 2022-06-19 PROCEDURE — 83605 ASSAY OF LACTIC ACID: CPT | Performed by: EMERGENCY MEDICINE

## 2022-06-19 PROCEDURE — 81003 URINALYSIS AUTO W/O SCOPE: CPT | Performed by: EMERGENCY MEDICINE

## 2022-06-19 RX ORDER — DICYCLOMINE HCL 20 MG
20 TABLET ORAL EVERY 8 HOURS PRN
Qty: 20 TABLET | Refills: 0 | Status: SHIPPED | OUTPATIENT
Start: 2022-06-19 | End: 2022-12-12

## 2022-06-19 RX ORDER — SODIUM CHLORIDE 9 MG/ML
10 INJECTION INTRAVENOUS AS NEEDED
Status: DISCONTINUED | OUTPATIENT
Start: 2022-06-19 | End: 2022-06-19 | Stop reason: HOSPADM

## 2022-06-19 RX ORDER — DICYCLOMINE HYDROCHLORIDE 10 MG/1
20 CAPSULE ORAL ONCE
Status: COMPLETED | OUTPATIENT
Start: 2022-06-19 | End: 2022-06-19

## 2022-06-19 RX ORDER — AMOXICILLIN AND CLAVULANATE POTASSIUM 875; 125 MG/1; MG/1
1 TABLET, FILM COATED ORAL 2 TIMES DAILY
Qty: 14 TABLET | Refills: 0 | Status: SHIPPED | OUTPATIENT
Start: 2022-06-19 | End: 2022-12-12

## 2022-06-19 RX ORDER — ONDANSETRON 4 MG/1
4 TABLET, FILM COATED ORAL EVERY 8 HOURS PRN
Qty: 15 TABLET | Refills: 0 | Status: SHIPPED | OUTPATIENT
Start: 2022-06-19 | End: 2022-12-12

## 2022-06-19 RX ORDER — ONDANSETRON 2 MG/ML
4 INJECTION INTRAMUSCULAR; INTRAVENOUS ONCE
Status: COMPLETED | OUTPATIENT
Start: 2022-06-19 | End: 2022-06-19

## 2022-06-19 RX ADMIN — DICYCLOMINE HYDROCHLORIDE 20 MG: 10 CAPSULE ORAL at 10:49

## 2022-06-19 RX ADMIN — IOPAMIDOL 90 ML: 612 INJECTION, SOLUTION INTRAVENOUS at 11:02

## 2022-06-19 RX ADMIN — ONDANSETRON 4 MG: 2 INJECTION INTRAMUSCULAR; INTRAVENOUS at 10:50

## 2022-06-19 RX ADMIN — SODIUM CHLORIDE 1000 ML: 9 INJECTION, SOLUTION INTRAVENOUS at 10:48

## 2022-06-19 NOTE — ED PROVIDER NOTES
"Subjective   61-year-old female with a history of recurrent diverticulitis presents for evaluation of left lower quadrant abdominal pain.  She was advised to come here this morning by her primary care physician.  4 days ago she began experiencing pain in her left lower quadrant that has persisted since that time.  Last night, she states that she had a \"low-grade fever.\"  She denies any urinary symptoms.  Her pain is worse with movement, and she currently rates it at 6 out of 10 in severity.  She denies any vomiting or diarrhea.  She states that her current symptoms feel quite similar to those that she has experienced in the past with prior diverticulitis flares.          Review of Systems   Gastrointestinal: Positive for abdominal pain.   All other systems reviewed and are negative.      Past Medical History:   Diagnosis Date   • Abnormal liver enzymes     11/2020-  Elevated liver panel significant for positive ARSH, elevated AST/ALT/GGT/and Ferritin   • Acute diverticulitis 09/2013    sigmoid colon dx by CT (also episodes 6/14, 9/14, and 12/14)   • Anxiety disorder    • Closed fracture of distal phalanx or phalanges of hand 11/2013    Of the fourth finger, right   • Closed head injury 06/2014    CT negative   • COVID-19 virus infection 12/31/2021   • Diverticulosis of large intestine    • Encounter for screening for cardiovascular disorders 03/28/2022    Coronary Artery Calcium Score 2   • Gastroesophageal reflux disease    • Heart murmur    • History of esophagogastroduodenoscopy (EGD) 12/21/2015    small hiatal hernia, mild duodenitis, and concentric espohageal rings suggestive of esophsgitis- path c/w mild chronic esophagitis w/ increased eosinophils suggestive of reflux (Schnider)   • History of varicella    • Hyperlipidemia     Description: dx 7/08.   • Hypertension     Description: dx approx 2000.   • Hypothyroidism     Description: dx 4/14.   • Lung nodule     Description: 9/13- RLL (4 mm) nodule).  5/2/14- " stable.   • Screening for cardiovascular condition 03/28/2022    Coronary Artery Calcium Score 2   • Vitamin D deficiency     Dx 10/19       Allergies   Allergen Reactions   • Augmentin [Amoxicillin-Pot Clavulanate] Rash   • Morphine And Related Rash   • Tetracyclines & Related Rash, GI Intolerance and Swelling       Past Surgical History:   Procedure Laterality Date   • APPENDECTOMY  1952   • CHOLECYSTECTOMY  1993   • SUBTOTAL COLECTOMY  04/13/2018    Robotic LAR (diverticulitis)   • TOTAL ABDOMINAL HYSTERECTOMY WITH SALPINGO OOPHORECTOMY Bilateral 02/2004       Family History   Problem Relation Age of Onset   • Diabetes Mother    • Hypertension Mother    • Hypothyroidism Mother    • Coronary artery disease Father 47        MI age 47 and 65   • Hypertension Father    • Hypothyroidism Sister    • Diabetes Maternal Aunt    • Breast cancer Maternal Aunt    • Coronary artery disease Maternal Grandmother    • Diabetes Maternal Grandmother    • Colon cancer Paternal Grandmother    • Coronary artery disease Paternal Grandfather    • Hypothyroidism Sister    • Ovarian cancer Neg Hx        Social History     Socioeconomic History   • Marital status:    • Number of children: 2   Tobacco Use   • Smoking status: Never Smoker   • Smokeless tobacco: Never Used   Substance and Sexual Activity   • Alcohol use: Yes     Comment: 2-3 glasses of wine weekends   • Drug use: No   • Sexual activity: Yes     Partners: Male     Birth control/protection: Surgical           Objective   Physical Exam  Vitals and nursing note reviewed.   Constitutional:       General: She is not in acute distress.     Appearance: She is well-developed. She is not diaphoretic.      Comments: Nontoxic-appearing female   HENT:      Head: Normocephalic and atraumatic.   Cardiovascular:      Rate and Rhythm: Normal rate and regular rhythm.      Heart sounds: Normal heart sounds. No murmur heard.    No friction rub. No gallop.   Pulmonary:      Effort:  Pulmonary effort is normal. No respiratory distress.      Breath sounds: Normal breath sounds. No wheezing or rales.   Abdominal:      General: Bowel sounds are normal. There is no distension.      Palpations: Abdomen is soft. There is no mass.      Tenderness: There is abdominal tenderness. There is guarding. There is no rebound.      Comments: Focal left lower quadrant tenderness with guarding noted, no pain out of proportion to exam   Genitourinary:     Comments: No CVA tenderness present  Musculoskeletal:         General: Normal range of motion.      Cervical back: Neck supple.   Skin:     General: Skin is warm and dry.      Findings: No erythema or rash.   Neurological:      General: No focal deficit present.      Mental Status: She is alert and oriented to person, place, and time.   Psychiatric:         Mood and Affect: Mood normal.         Thought Content: Thought content normal.         Judgment: Judgment normal.         Procedures           ED Course  ED Course as of 06/19/22 1143   Sun Jun 19, 2022   1028 61-year-old female with a prior history of diverticulitis presents for evaluation of left lower quadrant pain for the past 4 days.  On arrival to the ED, the patient is nontoxic-appearing.  Exam remarkable for focal left lower quadrant tenderness with guarding noted.  No pain out of proportion to exam.  We will obtain labs and imaging, and we will reassess following initial interventions. [DD]   1047 Labs are bland. [DD]   1120 CT consistent with uncomplicated diverticulitis.  I feel that the patient can be managed as an outpatient.  Scripts for Augmentin, Bentyl, and Zofran.  She will follow-up with her primary care physician within the next week.  Agreeable with plan and given appropriate strict return precautions. [DD]      ED Course User Index  [DD] Yoshi Chahal MD                                         Recent Results (from the past 24 hour(s))   Comprehensive Metabolic Panel    Collection  Time: 06/19/22 10:02 AM    Specimen: Blood   Result Value Ref Range    Glucose 96 65 - 99 mg/dL    BUN 13 6 - 20 mg/dL    Creatinine 0.71 0.57 - 1.00 mg/dL    Sodium 144 136 - 145 mmol/L    Potassium 3.8 3.5 - 5.2 mmol/L    Chloride 104 98 - 107 mmol/L    CO2 27.0 22.0 - 29.0 mmol/L    Calcium 9.5 8.6 - 10.5 mg/dL    Total Protein 7.3 6.0 - 8.5 g/dL    Albumin 4.40 3.50 - 5.20 g/dL    ALT (SGPT) 70 (H) 1 - 33 U/L    AST (SGOT) 53 (H) 1 - 32 U/L    Alkaline Phosphatase 54 39 - 117 U/L    Total Bilirubin 0.5 0.0 - 1.2 mg/dL    Globulin 2.9 gm/dL    A/G Ratio 1.5 g/dL    BUN/Creatinine Ratio 18.3 7.0 - 25.0    Anion Gap 13.0 5.0 - 15.0 mmol/L    eGFR 98.1 >60.0 mL/min/1.73   Lipase    Collection Time: 06/19/22 10:02 AM    Specimen: Blood   Result Value Ref Range    Lipase 20 13 - 60 U/L   Lactic Acid, Plasma    Collection Time: 06/19/22 10:02 AM    Specimen: Blood   Result Value Ref Range    Lactate 1.4 0.5 - 2.0 mmol/L   Green Top (Gel)    Collection Time: 06/19/22 10:02 AM   Result Value Ref Range    Extra Tube Hold for add-ons.    Lavender Top    Collection Time: 06/19/22 10:02 AM   Result Value Ref Range    Extra Tube hold for add-on    Gold Top - SST    Collection Time: 06/19/22 10:02 AM   Result Value Ref Range    Extra Tube Hold for add-ons.    Light Blue Top    Collection Time: 06/19/22 10:02 AM   Result Value Ref Range    Extra Tube Hold for add-ons.    CBC Auto Differential    Collection Time: 06/19/22 10:02 AM    Specimen: Blood   Result Value Ref Range    WBC 8.27 3.40 - 10.80 10*3/mm3    RBC 4.01 3.77 - 5.28 10*6/mm3    Hemoglobin 14.0 12.0 - 15.9 g/dL    Hematocrit 40.1 34.0 - 46.6 %    .0 (H) 79.0 - 97.0 fL    MCH 34.9 (H) 26.6 - 33.0 pg    MCHC 34.9 31.5 - 35.7 g/dL    RDW 12.2 (L) 12.3 - 15.4 %    RDW-SD 44.9 37.0 - 54.0 fl    MPV 10.4 6.0 - 12.0 fL    Platelets 249 140 - 450 10*3/mm3    Neutrophil % 58.2 42.7 - 76.0 %    Lymphocyte % 26.8 19.6 - 45.3 %    Monocyte % 9.4 5.0 - 12.0 %     "Eosinophil % 4.7 0.3 - 6.2 %    Basophil % 0.4 0.0 - 1.5 %    Immature Grans % 0.5 0.0 - 0.5 %    Neutrophils, Absolute 4.81 1.70 - 7.00 10*3/mm3    Lymphocytes, Absolute 2.22 0.70 - 3.10 10*3/mm3    Monocytes, Absolute 0.78 0.10 - 0.90 10*3/mm3    Eosinophils, Absolute 0.39 0.00 - 0.40 10*3/mm3    Basophils, Absolute 0.03 0.00 - 0.20 10*3/mm3    Immature Grans, Absolute 0.04 0.00 - 0.05 10*3/mm3    nRBC 0.0 0.0 - 0.2 /100 WBC   Urinalysis With Microscopic If Indicated (No Culture) - Urine, Clean Catch    Collection Time: 06/19/22 10:51 AM    Specimen: Urine, Clean Catch   Result Value Ref Range    Color, UA Yellow Yellow, Straw    Appearance, UA Clear Clear    pH, UA 7.5 5.0 - 8.0    Specific Gravity, UA 1.018 1.001 - 1.030    Glucose, UA Negative Negative    Ketones, UA Negative Negative    Bilirubin, UA Negative Negative    Blood, UA Negative Negative    Protein, UA Negative Negative    Leuk Esterase, UA Negative Negative    Nitrite, UA Negative Negative    Urobilinogen, UA 0.2 E.U./dL 0.2 - 1.0 E.U./dL     Note: In addition to lab results from this visit, the labs listed above may include labs taken at another facility or during a different encounter within the last 24 hours. Please correlate lab times with ED admission and discharge times for further clarification of the services performed during this visit.    CT Abdomen Pelvis With Contrast   Final Result   Acute uncomplicated diverticulitis at the descending colon/sigmoid   junction.       This report was finalized on 6/19/2022 11:14 AM by Larry Galicia MD.            Vitals:    06/19/22 0943 06/19/22 1115   BP: 156/91    BP Location: Left arm    Patient Position: Sitting    Pulse: 100    Resp: 17    Temp: 98.2 °F (36.8 °C)    TempSrc: Oral    SpO2: 97% 97%   Weight: 74.8 kg (165 lb)    Height: 162.6 cm (64\")      Medications   Sodium Chloride (PF) 0.9 % 10 mL (has no administration in time range)   sodium chloride 0.9 % bolus 1,000 mL (0 mL Intravenous " Stopped 6/19/22 1124)   ondansetron (ZOFRAN) injection 4 mg (4 mg Intravenous Given 6/19/22 1050)   dicyclomine (BENTYL) capsule 20 mg (20 mg Oral Given 6/19/22 1049)   iopamidol (ISOVUE-300) 61 % injection 100 mL (90 mL Intravenous Given 6/19/22 1102)     ECG/EMG Results (last 24 hours)     ** No results found for the last 24 hours. **        No orders to display               MDM    Final diagnoses:   Diverticulitis       ED Disposition  ED Disposition     ED Disposition   Discharge    Condition   Stable    Comment   --             Maia Melton MD  [address] [id number] [phone]    In 1 week           Medication List      New Prescriptions    amoxicillin-clavulanate 875-125 MG per tablet  Commonly known as: AUGMENTIN  Take 1 tablet by mouth 2 (Two) Times a Day.     dicyclomine 20 MG tablet  Commonly known as: BENTYL  Take 1 tablet by mouth Every 8 (Eight) Hours As Needed (pain).     ondansetron 4 MG tablet  Commonly known as: ZOFRAN  Take 1 tablet by mouth Every 8 (Eight) Hours As Needed for Nausea.           Where to Get Your Medications      These medications were sent to [facility] [address] [place] [address] AT [facility] & [address] - [phone]  - [phone] [address] [address]    Phone: [phone]   · amoxicillin-clavulanate 875-125 MG per tablet  · dicyclomine 20 MG tablet  · ondansetron 4 MG tablet          Yoshi Chahal MD  06/19/22 3185

## 2022-09-23 DIAGNOSIS — E78.49 OTHER HYPERLIPIDEMIA: ICD-10-CM

## 2022-09-23 RX ORDER — ATORVASTATIN CALCIUM 10 MG/1
TABLET, FILM COATED ORAL
Qty: 30 TABLET | Refills: 3 | Status: SHIPPED | OUTPATIENT
Start: 2022-09-23 | End: 2023-02-16

## 2022-12-05 ENCOUNTER — TRANSCRIBE ORDERS (OUTPATIENT)
Dept: ADMINISTRATIVE | Facility: HOSPITAL | Age: 62
End: 2022-12-05

## 2022-12-05 DIAGNOSIS — Z12.31 VISIT FOR SCREENING MAMMOGRAM: Primary | ICD-10-CM

## 2022-12-07 ENCOUNTER — HOSPITAL ENCOUNTER (OUTPATIENT)
Dept: MAMMOGRAPHY | Facility: HOSPITAL | Age: 62
Discharge: HOME | End: 2022-12-07
Admitting: INTERNAL MEDICINE

## 2022-12-07 DIAGNOSIS — Z12.31 VISIT FOR SCREENING MAMMOGRAM: ICD-10-CM

## 2022-12-07 PROCEDURE — 77067 SCR MAMMO BI INCL CAD: CPT

## 2022-12-07 PROCEDURE — 77063 BREAST TOMOSYNTHESIS BI: CPT

## 2022-12-07 PROCEDURE — 77067 SCR MAMMO BI INCL CAD: CPT | Performed by: RADIOLOGY

## 2022-12-07 PROCEDURE — 77063 BREAST TOMOSYNTHESIS BI: CPT | Performed by: RADIOLOGY

## 2022-12-12 ENCOUNTER — LAB (OUTPATIENT)
Dept: LAB | Facility: HOSPITAL | Age: 62
End: 2022-12-12

## 2022-12-12 ENCOUNTER — OFFICE VISIT (OUTPATIENT)
Dept: INTERNAL MEDICINE | Facility: CLINIC | Age: 62
End: 2022-12-12

## 2022-12-12 VITALS
TEMPERATURE: 97 F | OXYGEN SATURATION: 98 % | RESPIRATION RATE: 18 BRPM | HEART RATE: 94 BPM | WEIGHT: 168.8 LBS | BODY MASS INDEX: 28.82 KG/M2 | DIASTOLIC BLOOD PRESSURE: 90 MMHG | SYSTOLIC BLOOD PRESSURE: 158 MMHG | HEIGHT: 64 IN

## 2022-12-12 DIAGNOSIS — R05.3 CHRONIC COUGH: ICD-10-CM

## 2022-12-12 DIAGNOSIS — I10 PRIMARY HYPERTENSION: ICD-10-CM

## 2022-12-12 DIAGNOSIS — Z23 NEED FOR INFLUENZA VACCINATION: ICD-10-CM

## 2022-12-12 DIAGNOSIS — K21.9 GASTROESOPHAGEAL REFLUX DISEASE, UNSPECIFIED WHETHER ESOPHAGITIS PRESENT: ICD-10-CM

## 2022-12-12 DIAGNOSIS — E55.9 VITAMIN D DEFICIENCY: ICD-10-CM

## 2022-12-12 DIAGNOSIS — J30.2 SEASONAL ALLERGIC RHINITIS, UNSPECIFIED TRIGGER: ICD-10-CM

## 2022-12-12 DIAGNOSIS — Z00.00 ENCOUNTER FOR HEALTH MAINTENANCE EXAMINATION IN ADULT: Primary | ICD-10-CM

## 2022-12-12 DIAGNOSIS — E78.49 OTHER HYPERLIPIDEMIA: ICD-10-CM

## 2022-12-12 DIAGNOSIS — E03.9 ACQUIRED HYPOTHYROIDISM: ICD-10-CM

## 2022-12-12 DIAGNOSIS — F41.1 GENERALIZED ANXIETY DISORDER: ICD-10-CM

## 2022-12-12 DIAGNOSIS — Z00.00 ENCOUNTER FOR HEALTH MAINTENANCE EXAMINATION IN ADULT: ICD-10-CM

## 2022-12-12 DIAGNOSIS — Z23 NEED FOR COVID-19 VACCINE: ICD-10-CM

## 2022-12-12 LAB
25(OH)D3 SERPL-MCNC: 40.7 NG/ML (ref 30–100)
ALBUMIN SERPL-MCNC: 4.5 G/DL (ref 3.5–5.2)
ALBUMIN/GLOB SERPL: 1.5 G/DL
ALP SERPL-CCNC: 52 U/L (ref 39–117)
ALT SERPL W P-5'-P-CCNC: 47 U/L (ref 1–33)
ANION GAP SERPL CALCULATED.3IONS-SCNC: 14.8 MMOL/L (ref 5–15)
AST SERPL-CCNC: 40 U/L (ref 1–32)
BASOPHILS # BLD AUTO: 0.04 10*3/MM3 (ref 0–0.2)
BASOPHILS NFR BLD AUTO: 0.4 % (ref 0–1.5)
BILIRUB SERPL-MCNC: 0.7 MG/DL (ref 0–1.2)
BUN SERPL-MCNC: 14 MG/DL (ref 8–23)
BUN/CREAT SERPL: 16.7 (ref 7–25)
CALCIUM SPEC-SCNC: 9.7 MG/DL (ref 8.6–10.5)
CHLORIDE SERPL-SCNC: 100 MMOL/L (ref 98–107)
CHOLEST SERPL-MCNC: 196 MG/DL (ref 0–200)
CO2 SERPL-SCNC: 23.2 MMOL/L (ref 22–29)
CREAT SERPL-MCNC: 0.84 MG/DL (ref 0.57–1)
DEPRECATED RDW RBC AUTO: 41.4 FL (ref 37–54)
EGFRCR SERPLBLD CKD-EPI 2021: 79.7 ML/MIN/1.73
EOSINOPHIL # BLD AUTO: 0.31 10*3/MM3 (ref 0–0.4)
EOSINOPHIL NFR BLD AUTO: 3.5 % (ref 0.3–6.2)
ERYTHROCYTE [DISTWIDTH] IN BLOOD BY AUTOMATED COUNT: 11.7 % (ref 12.3–15.4)
GLOBULIN UR ELPH-MCNC: 3.1 GM/DL
GLUCOSE SERPL-MCNC: 95 MG/DL (ref 65–99)
HCT VFR BLD AUTO: 42.4 % (ref 34–46.6)
HDLC SERPL-MCNC: 68 MG/DL (ref 40–60)
HGB BLD-MCNC: 15 G/DL (ref 12–15.9)
IMM GRANULOCYTES # BLD AUTO: 0.03 10*3/MM3 (ref 0–0.05)
IMM GRANULOCYTES NFR BLD AUTO: 0.3 % (ref 0–0.5)
LDLC SERPL CALC-MCNC: 104 MG/DL (ref 0–100)
LDLC/HDLC SERPL: 1.47 {RATIO}
LYMPHOCYTES # BLD AUTO: 1.86 10*3/MM3 (ref 0.7–3.1)
LYMPHOCYTES NFR BLD AUTO: 20.9 % (ref 19.6–45.3)
MCH RBC QN AUTO: 34.2 PG (ref 26.6–33)
MCHC RBC AUTO-ENTMCNC: 35.4 G/DL (ref 31.5–35.7)
MCV RBC AUTO: 96.8 FL (ref 79–97)
MONOCYTES # BLD AUTO: 0.64 10*3/MM3 (ref 0.1–0.9)
MONOCYTES NFR BLD AUTO: 7.2 % (ref 5–12)
NEUTROPHILS NFR BLD AUTO: 6.02 10*3/MM3 (ref 1.7–7)
NEUTROPHILS NFR BLD AUTO: 67.7 % (ref 42.7–76)
NRBC BLD AUTO-RTO: 0 /100 WBC (ref 0–0.2)
PLATELET # BLD AUTO: 264 10*3/MM3 (ref 140–450)
PMV BLD AUTO: 10.6 FL (ref 6–12)
POTASSIUM SERPL-SCNC: 4.3 MMOL/L (ref 3.5–5.2)
PROT SERPL-MCNC: 7.6 G/DL (ref 6–8.5)
RBC # BLD AUTO: 4.38 10*6/MM3 (ref 3.77–5.28)
SODIUM SERPL-SCNC: 138 MMOL/L (ref 136–145)
T4 FREE SERPL-MCNC: 1.07 NG/DL (ref 0.93–1.7)
TRIGL SERPL-MCNC: 139 MG/DL (ref 0–150)
TSH SERPL DL<=0.05 MIU/L-ACNC: 4.65 UIU/ML (ref 0.27–4.2)
VLDLC SERPL-MCNC: 24 MG/DL (ref 5–40)
WBC NRBC COR # BLD: 8.9 10*3/MM3 (ref 3.4–10.8)

## 2022-12-12 PROCEDURE — 99396 PREV VISIT EST AGE 40-64: CPT | Performed by: INTERNAL MEDICINE

## 2022-12-12 PROCEDURE — 80061 LIPID PANEL: CPT

## 2022-12-12 PROCEDURE — 90686 IIV4 VACC NO PRSV 0.5 ML IM: CPT | Performed by: INTERNAL MEDICINE

## 2022-12-12 PROCEDURE — 0124A PR ADM SARSCOV2 30MCG/0.3ML BST: CPT | Performed by: INTERNAL MEDICINE

## 2022-12-12 PROCEDURE — 99213 OFFICE O/P EST LOW 20 MIN: CPT | Performed by: INTERNAL MEDICINE

## 2022-12-12 PROCEDURE — 82306 VITAMIN D 25 HYDROXY: CPT

## 2022-12-12 PROCEDURE — 80050 GENERAL HEALTH PANEL: CPT

## 2022-12-12 PROCEDURE — 84439 ASSAY OF FREE THYROXINE: CPT

## 2022-12-12 PROCEDURE — 91312 COVID-19 (PFIZER) BIVALENT BOOSTER 12+YRS: CPT | Performed by: INTERNAL MEDICINE

## 2022-12-12 PROCEDURE — 90471 IMMUNIZATION ADMIN: CPT | Performed by: INTERNAL MEDICINE

## 2022-12-12 RX ORDER — PREDNISONE 20 MG/1
TABLET ORAL
Qty: 11 TABLET | Refills: 0 | Status: SHIPPED | OUTPATIENT
Start: 2022-12-12 | End: 2023-02-08

## 2022-12-12 RX ORDER — FLUTICASONE PROPIONATE 50 MCG
2 SPRAY, SUSPENSION (ML) NASAL DAILY PRN
Qty: 16 G | Refills: 5 | Status: SHIPPED | OUTPATIENT
Start: 2022-12-12

## 2022-12-12 RX ORDER — LEVOCETIRIZINE DIHYDROCHLORIDE 5 MG/1
5 TABLET, FILM COATED ORAL DAILY PRN
Qty: 30 TABLET | Refills: 5 | Status: SHIPPED | OUTPATIENT
Start: 2022-12-12

## 2022-12-12 NOTE — PATIENT INSTRUCTIONS
Discontinue Allegra.      I recommend Shingrix (new Shingles vaccine) at the pharmacy.    Health Maintenance for Postmenopausal Women  Menopause is a normal process in which your ability to get pregnant comes to an end. This process happens slowly over many months or years, usually between the ages of 48 and 55. Menopause is complete when you have missed your menstrual period for 12 months.  It is important to talk with your health care provider about some of the most common conditions that affect women after menopause (postmenopausal women). These include heart disease, cancer, and bone loss (osteoporosis). Adopting a healthy lifestyle and getting preventive care can help to promote your health and wellness. The actions you take can also lower your chances of developing some of these common conditions.  What are the signs and symptoms of menopause?  During menopause, you may have the following symptoms:  Hot flashes. These can be moderate or severe.  Night sweats.  Decrease in sex drive.  Mood swings.  Headaches.  Tiredness (fatigue).  Irritability.  Memory problems.  Problems falling asleep or staying asleep.  Talk with your health care provider about treatment options for your symptoms.  Do I need hormone replacement therapy?  Hormone replacement therapy is effective in treating symptoms that are caused by menopause, such as hot flashes and night sweats.  Hormone replacement carries certain risks, especially as you become older. If you are thinking about using estrogen or estrogen with progestin, discuss the benefits and risks with your health care provider.  How can I reduce my risk for heart disease and stroke?  The risk of heart disease, heart attack, and stroke increases as you age. One of the causes may be a change in the body's hormones during menopause. This can affect how your body uses dietary fats, triglycerides, and cholesterol. Heart attack and stroke are medical emergencies. There are many things  that you can do to help prevent heart disease and stroke.  Watch your blood pressure  High blood pressure causes heart disease and increases the risk of stroke. This is more likely to develop in people who have high blood pressure readings or are overweight.  Have your blood pressure checked:  Every 3-5 years if you are 18-39 years of age.  Every year if you are 40 years old or older.  Eat a healthy diet    Eat a diet that includes plenty of vegetables, fruits, low-fat dairy products, and lean protein.  Do not eat a lot of foods that are high in solid fats, added sugars, or sodium.  Get regular exercise  Get regular exercise. This is one of the most important things you can do for your health. Most adults should:  Try to exercise for at least 150 minutes each week. The exercise should increase your heart rate and make you sweat (moderate-intensity exercise).  Try to do strengthening exercises at least twice each week. Do these in addition to the moderate-intensity exercise.  Spend less time sitting. Even light physical activity can be beneficial.  Other tips  Work with your health care provider to achieve or maintain a healthy weight.  Do not use any products that contain nicotine or tobacco. These products include cigarettes, chewing tobacco, and vaping devices, such as e-cigarettes. If you need help quitting, ask your health care provider.  Know your numbers. Ask your health care provider to check your cholesterol and your blood sugar (glucose). Continue to have your blood tested as directed by your health care provider.  Do I need screening for cancer?  Depending on your health history and family history, you may need to have cancer screenings at different stages of your life. This may include screening for:  Breast cancer.  Cervical cancer.  Lung cancer.  Colorectal cancer.  What is my risk for osteoporosis?  After menopause, you may be at increased risk for osteoporosis. Osteoporosis is a condition in which  bone destruction happens more quickly than new bone creation. To help prevent osteoporosis or the bone fractures that can happen because of osteoporosis, you may take the following actions:  If you are 19-50 years old, get at least 1,000 mg of calcium and at least 600 international units (IU) of vitamin D per day.  If you are older than age 50 but younger than age 70, get at least 1,200 mg of calcium and at least 600 international units (IU) of vitamin D per day.  If you are older than age 70, get at least 1,200 mg of calcium and at least 800 international units (IU) of vitamin D per day.  Smoking and drinking excessive alcohol increase the risk of osteoporosis. Eat foods that are rich in calcium and vitamin D, and do weight-bearing exercises several times each week as directed by your health care provider.  How does menopause affect my mental health?  Depression may occur at any age, but it is more common as you become older. Common symptoms of depression include:  Feeling depressed.  Changes in sleep patterns.  Changes in appetite or eating patterns.  Feeling an overall lack of motivation or enjoyment of activities that you previously enjoyed.  Frequent crying spells.  Talk with your health care provider if you think that you are experiencing any of these symptoms.  General instructions  See your health care provider for regular wellness exams and vaccines. This may include:  Scheduling regular health, dental, and eye exams.  Getting and maintaining your vaccines. These include:  Influenza vaccine. Get this vaccine each year before the flu season begins.  Pneumonia vaccine.  Shingles vaccine.  Tetanus, diphtheria, and pertussis (Tdap) booster vaccine.  Your health care provider may also recommend other immunizations.  Tell your health care provider if you have ever been abused or do not feel safe at home.  Summary  Menopause is a normal process in which your ability to get pregnant comes to an end.  This condition  causes hot flashes, night sweats, decreased interest in sex, mood swings, headaches, or lack of sleep.  Treatment for this condition may include hormone replacement therapy.  Take actions to keep yourself healthy, including exercising regularly, eating a healthy diet, watching your weight, and checking your blood pressure and blood sugar levels.  Get screened for cancer and depression. Make sure that you are up to date with all your vaccines.  This information is not intended to replace advice given to you by your health care provider. Make sure you discuss any questions you have with your health care provider.  Document Revised: 05/09/2022 Document Reviewed: 05/09/2022  [employer] Patient Education © 2022 [employer]  MyPlate from [employer]  MyPlate is an outline of a general healthy diet based on the Dietary Guidelines for Americans, 1994-5656, from the U[place]. Department of Agriculture (USDA). It sets guidelines for how much food you should eat from each food group based on your age, sex, and level of physical activity.  What are tips for following MyPlate?  To follow MyPlate recommendations:  Eat a wide variety of fruits and vegetables, grains, and protein foods.  Serve smaller portions and eat less food throughout the day.  Limit portion sizes to avoid overeating.  Enjoy your food.  Get at least 150 minutes of exercise every week. This is about 30 minutes each day, 5 or more days per week.  It can be difficult to have every meal look like MyPlate. Think about MyPlate as eating guidelines for an entire day, rather than each individual meal.  Fruits and vegetables  Make one half of your plate fruits and vegetables.  Eat many different colors of fruits and vegetables each day.  For a 2,000-calorie daily food plan, eat:  2½ cups of vegetables every day.  2 cups of fruit every day.  1 cup is equal to:  1 cup raw or cooked vegetables.  1 cup raw fruit.  1 medium-sized orange, apple, or banana.  1 cup 100% fruit or vegetable  juice.  2 cups raw leafy greens, such as lettuce, spinach, or kale.  ½ cup dried fruit.  Grains  One fourth of your plate should be grains.  Make at least half of the grains you eat each day whole grains.  For a 2,000-calorie daily food plan, eat 6 oz of grains every day.  1 oz is equal to:  1 slice bread.  1 cup cereal.  ½ cup cooked rice, cereal, or pasta.  Protein  One fourth of your plate should be protein.  Eat a wide variety of protein foods, including meat, poultry, fish, eggs, beans, nuts, and tofu.  For a 2,000-calorie daily food plan, eat 5½ oz of protein every day.  1 oz is equal to:  1 oz meat, poultry, or fish.  ¼ cup cooked beans.  1 egg.  ½ oz nuts or seeds.  1 Tbsp peanut butter.  Dairy  Drink fat-free or low-fat (1%) milk.  Eat or drink dairy as a side to meals.  For a 2,000-calorie daily food plan, eat or drink 3 cups of dairy every day.  1 cup is equal to:  1 cup milk, yogurt, cottage cheese, or soy milk (soy beverage).  2 oz processed cheese.  1½ oz natural cheese.  Fats, oils, salt, and sugars  Only small amounts of oils are recommended.  Avoid foods that are high in calories and low in nutritional value (empty calories), like foods high in fat or added sugars.  Choose foods that are low in salt (sodium). Choose foods that have less than 140 milligrams (mg) of sodium per serving.  Drink water instead of sugary drinks. Drink enough fluid to keep your urine pale yellow.  Where to find support  Work with your health care provider or a dietitian to develop a customized eating plan that is right for you.  Download an yumiko (mobile application) to help you track your daily food intake.  Where to find more information  USDA: ChooseMyPlate.gov  Summary  MyPlate is a general guideline for healthy eating from the USDA. It is based on the Dietary Guidelines for Americans, 7980-9655.  In general, fruits and vegetables should take up one half of your plate, grains should take up one fourth of your plate, and  protein should take up one fourth of your plate.  This information is not intended to replace advice given to you by your health care provider. Make sure you discuss any questions you have with your health care provider.  Document Revised: 11/08/2021 Document Reviewed: 11/08/2021  Elsevier Patient Education © 2022 [employer]  Calorie Counting for Weight Loss  Calories are units of energy. Your body needs a certain number of calories from food to keep going throughout the day. When you eat or drink more calories than your body needs, your body stores the extra calories mostly as fat. When you eat or drink fewer calories than your body needs, your body burns fat to get the energy it needs.  Calorie counting means keeping track of how many calories you eat and drink each day. Calorie counting can be helpful if you need to lose weight. If you eat fewer calories than your body needs, you should lose weight. Ask your health care provider what a healthy weight is for you.  For calorie counting to work, you will need to eat the right number of calories each day to lose a healthy amount of weight per week. A dietitian can help you figure out how many calories you need in a day and will suggest ways to reach your calorie goal.  A healthy amount of weight to lose each week is usually 1-2 lb (0.5-0.9 kg). This usually means that your daily calorie intake should be reduced by 500-750 calories.  Eating 1,200-1,500 calories a day can help most women lose weight.  Eating 1,500-1,800 calories a day can help most men lose weight.  What do I need to know about calorie counting?  Work with your health care provider or dietitian to determine how many calories you should get each day. To meet your daily calorie goal, you will need to:  Find out how many calories are in each food that you would like to eat. Try to do this before you eat.  Decide how much of the food you plan to eat.  Keep a food log. Do this by writing down what you  ate and how many calories it had.  To successfully lose weight, it is important to balance calorie counting with a healthy lifestyle that includes regular activity.  Where do I find calorie information?  The number of calories in a food can be found on a Nutrition Facts label. If a food does not have a Nutrition Facts label, try to look up the calories online or ask your dietitian for help.  Remember that calories are listed per serving. If you choose to have more than one serving of a food, you will have to multiply the calories per serving by the number of servings you plan to eat. For example, the label on a package of bread might say that a serving size is 1 slice and that there are 90 calories in a serving. If you eat 1 slice, you will have eaten 90 calories. If you eat 2 slices, you will have eaten 180 calories.  How do I keep a food log?  After each time that you eat, record the following in your food log as soon as possible:  What you ate. Be sure to include toppings, sauces, and other extras on the food.  How much you ate. This can be measured in cups, ounces, or number of items.  How many calories were in each food and drink.  The total number of calories in the food you ate.  Keep your food log near you, such as in a pocket-sized notebook or on an yumiko or website on your mobile phone. Some programs will calculate calories for you and show you how many calories you have left to meet your daily goal.  What are some portion-control tips?  Know how many calories are in a serving. This will help you know how many servings you can have of a certain food.  Use a measuring cup to measure serving sizes. You could also try weighing out portions on a kitchen scale. With time, you will be able to estimate serving sizes for some foods.  Take time to put servings of different foods on your favorite plates or in your favorite bowls and cups so you know what a serving looks like.  Try not to eat straight from a food's  packaging, such as from a bag or box. Eating straight from the package makes it hard to see how much you are eating and can lead to overeating. Put the amount you would like to eat in a cup or on a plate to make sure you are eating the right portion.  Use smaller plates, glasses, and bowls for smaller portions and to prevent overeating.  Try not to multitask. For example, avoid watching TV or using your computer while eating. If it is time to eat, sit down at a table and enjoy your food. This will help you recognize when you are full. It will also help you be more mindful of what and how much you are eating.  What are tips for following this plan?  Reading food labels  Check the calorie count compared with the serving size. The serving size may be smaller than what you are used to eating.  Check the source of the calories. Try to choose foods that are high in protein, fiber, and vitamins, and low in saturated fat, trans fat, and sodium.  Shopping  Read nutrition labels while you shop. This will help you make healthy decisions about which foods to buy.  Pay attention to nutrition labels for low-fat or fat-free foods. These foods sometimes have the same number of calories or more calories than the full-fat versions. They also often have added sugar, starch, or salt to make up for flavor that was removed with the fat.  Make a grocery list of lower-calorie foods and stick to it.  Cooking  Try to cook your favorite foods in a healthier way. For example, try baking instead of frying.  Use low-fat dairy products.  Meal planning  Use more fruits and vegetables. One-half of your plate should be fruits and vegetables.  Include lean proteins, such as chicken, turkey, and fish.  Lifestyle  Each week, aim to do one of the followin minutes of moderate exercise, such as walking.  75 minutes of vigorous exercise, such as running.  General information  Know how many calories are in the foods you eat most often. This will help  you calculate calorie counts faster.  Find a way of tracking calories that works for you. Get creative. Try different apps or programs if writing down calories does not work for you.  What foods should I eat?    Eat nutritious foods. It is better to have a nutritious, high-calorie food, such as an avocado, than a food with few nutrients, such as a bag of potato chips.  Use your calories on foods and drinks that will fill you up and will not leave you hungry soon after eating.  Examples of foods that fill you up are nuts and nut butters, vegetables, lean proteins, and high-fiber foods such as whole grains. High-fiber foods are foods with more than 5 g of fiber per serving.  Pay attention to calories in drinks. Low-calorie drinks include water and unsweetened drinks.  The items listed above may not be a complete list of foods and beverages you can eat. Contact a dietitian for more information.  What foods should I limit?  Limit foods or drinks that are not good sources of vitamins, minerals, or protein or that are high in unhealthy fats. These include:  Candy.  Other sweets.  Sodas, specialty coffee drinks, alcohol, and juice.  The items listed above may not be a complete list of foods and beverages you should avoid. Contact a dietitian for more information.  How do I count calories when eating out?  Pay attention to portions. Often, portions are much larger when eating out. Try these tips to keep portions smaller:  Consider sharing a meal instead of getting your own.  If you get your own meal, eat only half of it. Before you start eating, ask for a container and put half of your meal into it.  When available, consider ordering smaller portions from the menu instead of full portions.  Pay attention to your food and drink choices. Knowing the way food is cooked and what is included with the meal can help you eat fewer calories.  If calories are listed on the menu, choose the lower-calorie options.  Choose dishes that  include vegetables, fruits, whole grains, low-fat dairy products, and lean proteins.  Choose items that are boiled, broiled, grilled, or steamed. Avoid items that are buttered, battered, fried, or served with cream sauce. Items labeled as crispy are usually fried, unless stated otherwise.  Choose water, low-fat milk, unsweetened iced tea, or other drinks without added sugar. If you want an alcoholic beverage, choose a lower-calorie option, such as a glass of wine or light beer.  Ask for dressings, sauces, and syrups on the side. These are usually high in calories, so you should limit the amount you eat.  If you want a salad, choose a garden salad and ask for grilled meats. Avoid extra toppings such as roche, cheese, or fried items. Ask for the dressing on the side, or ask for olive oil and vinegar or lemon to use as dressing.  Estimate how many servings of a food you are given. Knowing serving sizes will help you be aware of how much food you are eating at restaurants.  Where to find more information  Centers for Disease Control and Prevention: www.cdc.gov  [place] Department of Agriculture: myplate.gov  Summary  Calorie counting means keeping track of how many calories you eat and drink each day. If you eat fewer calories than your body needs, you should lose weight.  A healthy amount of weight to lose per week is usually 1-2 lb (0.5-0.9 kg). This usually means reducing your daily calorie intake by 500-750 calories.  The number of calories in a food can be found on a Nutrition Facts label. If a food does not have a Nutrition Facts label, try to look up the calories online or ask your dietitian for help.  Use smaller plates, glasses, and bowls for smaller portions and to prevent overeating.  Use your calories on foods and drinks that will fill you up and not leave you hungry shortly after a meal.  This information is not intended to replace advice given to you by your health care provider. Make sure you discuss any  questions you have with your health care provider.  Document Revised: 01/28/2021 Document Reviewed: 01/28/2021  Elsevier Patient Education © 2022 [employer].  Exercising to Lose Weight  Getting regular exercise is important for everyone. It is especially important if you are overweight. Being overweight increases your risk of heart disease, stroke, diabetes, high blood pressure, and several types of cancer. Exercising, and reducing the calories you consume, can help you lose weight and improve fitness and health.  Exercise can be moderate or vigorous intensity. To lose weight, most people need to do a certain amount of moderate or vigorous-intensity exercise each week.  How can exercise affect me?  You lose weight when you exercise enough to burn more calories than you eat. Exercise also reduces body fat and builds muscle. The more muscle you have, the more calories you burn. Exercise also:  Improves mood.  Reduces stress and tension.  Improves your overall fitness, flexibility, and endurance.  Increases bone strength.  Moderate-intensity exercise  Moderate-intensity exercise is any activity that gets you moving enough to burn at least three times more energy (calories) than if you were sitting.  Examples of moderate exercise include:  Walking a mile in 15 minutes.  Doing light yard work.  Biking at an easy pace.  Most people should get at least 150 minutes of moderate-intensity exercise a week to maintain their body weight.  Vigorous-intensity exercise  Vigorous-intensity exercise is any activity that gets you moving enough to burn at least six times more calories than if you were sitting. When you exercise at this intensity, you should be working hard enough that you are not able to carry on a conversation.  Examples of vigorous exercise include:  Running.  Playing a team sport, such as football, basketball, and soccer.  Jumping rope.  Most people should get at least 75 minutes a week of vigorous exercise to  maintain their body weight.  What actions can I take to lose weight?  The amount of exercise you need to lose weight depends on:  Your age.  The type of exercise.  Any health conditions you have.  Your overall physical ability.  Talk to your health care provider about how much exercise you need and what types of activities are safe for you.  Nutrition    Make changes to your diet as told by your health care provider or diet and nutrition specialist (dietitian). This may include:  Eating fewer calories.  Eating more protein.  Eating less unhealthy fats.  Eating a diet that includes fresh fruits and vegetables, whole grains, low-fat dairy products, and lean protein.  Avoiding foods with added fat, salt, and sugar.  Drink plenty of water while you exercise to prevent dehydration or heat stroke.  Activity  Choose an activity that you enjoy and set realistic goals. Your health care provider can help you make an exercise plan that works for you.  Exercise at a moderate or vigorous intensity most days of the week.  The intensity of exercise may vary from person to person. You can tell how intense a workout is for you by paying attention to your breathing and heartbeat. Most people will notice their breathing and heartbeat get faster with more intense exercise.  Do resistance training twice each week, such as:  Push-ups.  Sit-ups.  Lifting weights.  Using resistance bands.  Getting short amounts of exercise can be just as helpful as long, structured periods of exercise. If you have trouble finding time to exercise, try doing these things as part of your daily routine:  Get up, stretch, and walk around every 30 minutes throughout the day.  Go for a walk during your lunch break.  Park your car farther away from your destination.  If you take public transportation, get off one stop early and walk the rest of the way.  Make phone calls while standing up and walking around.  Take the stairs instead of elevators or  escalators.  Wear comfortable clothes and shoes with good support.  Do not exercise so much that you hurt yourself, feel dizzy, or get very short of breath.  Where to find more information  [place] Department of Health and Human Services: www.hhs.gov  Centers for Disease Control and Prevention: www.cdc.gov  Contact a health care provider:  Before starting a new exercise program.  If you have questions or concerns about your weight.  If you have a medical problem that keeps you from exercising.  Get help right away if:  You have any of the following while exercising:  Injury.  Dizziness.  Difficulty breathing or shortness of breath that does not go away when you stop exercising.  Chest pain.  Rapid heartbeat.  These symptoms may represent a serious problem that is an emergency. Do not wait to see if the symptoms will go away. Get medical help right away. Call your local emergency services (911 in the [place] Do not drive yourself to the hospital.  Summary  Getting regular exercise is especially important if you are overweight.  Being overweight increases your risk of heart disease, stroke, diabetes, high blood pressure, and several types of cancer.  Losing weight happens when you burn more calories than you eat.  Reducing the amount of calories you eat, and getting regular moderate or vigorous exercise each week, helps you lose weight.  This information is not intended to replace advice given to you by your health care provider. Make sure you discuss any questions you have with your health care provider.  Document Revised: 02/13/2022 Document Reviewed: 02/13/2022  Elsevier Patient Education © 2022 [employer]

## 2022-12-13 ENCOUNTER — HOSPITAL ENCOUNTER (OUTPATIENT)
Dept: GENERAL RADIOLOGY | Facility: HOSPITAL | Age: 62
Discharge: HOME | End: 2022-12-13
Admitting: INTERNAL MEDICINE

## 2022-12-13 DIAGNOSIS — R05.3 CHRONIC COUGH: ICD-10-CM

## 2022-12-13 PROCEDURE — 71046 X-RAY EXAM CHEST 2 VIEWS: CPT

## 2022-12-14 DIAGNOSIS — I10 ESSENTIAL HYPERTENSION: ICD-10-CM

## 2022-12-14 DIAGNOSIS — F41.1 GENERALIZED ANXIETY DISORDER: ICD-10-CM

## 2022-12-14 RX ORDER — BUPROPION HYDROCHLORIDE 150 MG/1
TABLET ORAL
Qty: 90 TABLET | Refills: 1 | Status: SHIPPED | OUTPATIENT
Start: 2022-12-14

## 2022-12-14 RX ORDER — LOSARTAN POTASSIUM AND HYDROCHLOROTHIAZIDE 25; 100 MG/1; MG/1
TABLET ORAL
Qty: 90 TABLET | Refills: 1 | Status: SHIPPED | OUTPATIENT
Start: 2022-12-14

## 2022-12-18 DIAGNOSIS — E03.9 ACQUIRED HYPOTHYROIDISM: ICD-10-CM

## 2022-12-19 DIAGNOSIS — E03.9 ACQUIRED HYPOTHYROIDISM: ICD-10-CM

## 2022-12-19 RX ORDER — LEVOTHYROXINE SODIUM 88 UG/1
TABLET ORAL
Qty: 90 TABLET | Refills: 1 | Status: SHIPPED | OUTPATIENT
Start: 2022-12-19 | End: 2022-12-23 | Stop reason: SDUPTHER

## 2022-12-19 RX ORDER — LEVOTHYROXINE SODIUM 88 UG/1
88 TABLET ORAL DAILY
Qty: 90 TABLET | Refills: 1 | OUTPATIENT
Start: 2022-12-19

## 2022-12-22 ENCOUNTER — TELEPHONE (OUTPATIENT)
Dept: INTERNAL MEDICINE | Facility: CLINIC | Age: 62
End: 2022-12-22

## 2022-12-22 DIAGNOSIS — E03.9 ACQUIRED HYPOTHYROIDISM: ICD-10-CM

## 2022-12-22 NOTE — TELEPHONE ENCOUNTER
Call patient please.  Her TSH is slightly elevated, indicating under treatment for hypothyroidism.  I recommend increasing her Levothyroxine to 100 mcg daily.  If she is agreeable, please send in a prescription for the new dose, #30 with 5 refills.  I will also send a lab letter.

## 2022-12-23 RX ORDER — LEVOTHYROXINE SODIUM 0.1 MG/1
88 TABLET ORAL DAILY
Qty: 30 TABLET | Refills: 5 | Status: SHIPPED | OUTPATIENT
Start: 2022-12-23

## 2023-02-08 ENCOUNTER — TELEMEDICINE (OUTPATIENT)
Dept: INTERNAL MEDICINE | Facility: CLINIC | Age: 63
End: 2023-02-08
Payer: COMMERCIAL

## 2023-02-08 VITALS — TEMPERATURE: 99 F

## 2023-02-08 DIAGNOSIS — J06.9 UPPER RESPIRATORY TRACT INFECTION, UNSPECIFIED TYPE: Primary | ICD-10-CM

## 2023-02-08 PROCEDURE — 99213 OFFICE O/P EST LOW 20 MIN: CPT | Performed by: INTERNAL MEDICINE

## 2023-02-08 RX ORDER — CEFDINIR 300 MG/1
300 CAPSULE ORAL 2 TIMES DAILY
Qty: 20 CAPSULE | Refills: 0 | Status: SHIPPED | OUTPATIENT
Start: 2023-02-08 | End: 2023-02-19

## 2023-02-08 NOTE — PROGRESS NOTES
Subjective       Roz Randolph is a 62 y.o. female.     Chief Complaint   Patient presents with   • Allergies       History obtained from the patient.    The patient has chosen to receive care through a Telehealth visit.  The patient consented to use a video/audio connection for her medical care today.    The patient presents during the COVID-19 pandemic/federally declared [facility] public health emergency.  This service was conducted via Telehealth audio/visual by I-phone.  Connected to the patient using [email or web address].  This visit occurred with the Provider located at [facility] (@ [place], [place]) and the patient located at home in the [facility].  Other participants in this Telehealth visit included:        URI   This is a new problem. Episode onset: 4-5 days ago. The problem has been gradually worsening. Maximum temperature: . Associated symptoms include congestion, coughing (dry and cough, occasionally productive of clear/yellow sputum without hemoptysis), headaches, a plugged ear sensation (mild), rhinorrhea (clear), sinus pain, sneezing and a sore throat (mild). Pertinent negatives include no abdominal pain, chest pain, diarrhea, ear pain, joint pain, joint swelling, nausea, neck pain, rash, swollen glands, vomiting or wheezing. Associated symptoms comments: No loss of taste or smell  . Treatments tried: Allegra, Mucinex, Advil, and Pataday drops. The treatment provided mild relief.      There is no known exposure to COVID-19, Influenza, RSV, but she works in a .  The patient states she has had 2 negative home COVID-19 tests.  She has been fully vaccinated from COVID-19 and Influenza.    The following portions of the patient's history were reviewed and updated as appropriate: allergies, current medications, past family history, past medical history, past social history, past surgical history and problem list.      Review of Systems    Constitutional: Positive for fatigue and fever. Negative for appetite change and chills.   HENT: Positive for congestion, postnasal drip, rhinorrhea (clear), sinus pressure, sinus pain, sneezing and sore throat (mild). Negative for ear pain and voice change.    Eyes: Positive for pain, discharge (watery) and itching. Negative for redness.   Respiratory: Positive for cough (dry and cough, occasionally productive of clear/yellow sputum without hemoptysis). Negative for chest tightness, shortness of breath and wheezing.    Cardiovascular: Negative for chest pain.   Gastrointestinal: Negative for abdominal pain, diarrhea, nausea and vomiting.   Musculoskeletal: Negative for arthralgias, joint pain, joint swelling, myalgias, neck pain and neck stiffness.   Skin: Negative for rash.   Neurological: Positive for headaches.   Hematological: Negative for adenopathy.           Objective     Temperature 99 °F (37.2 °C), temperature source Infrared.    Physical Exam  Vitals reviewed.   Constitutional:       Comments: BMI greater than 25   Pulmonary:      Effort: Pulmonary effort is normal. No respiratory distress.   Lymphadenopathy:      Cervical: No cervical adenopathy (per patient exam).   Neurological:      Mental Status: She is alert.   Psychiatric:         Mood and Affect: Mood normal.           Assessment & Plan   Diagnoses and all orders for this visit:    1. Upper respiratory tract infection, unspecified type (Primary)  -     cefdinir (OMNICEF) 300 MG capsule; Take 1 capsule by mouth 2 (Two) Times a Day for 10 days.  Dispense: 20 capsule; Refill: 0     Continue current over the counter medication, add Flonase or Nasonex, and plenty of fluids.    Return if symptoms worsen or fail to improve.

## 2023-02-16 DIAGNOSIS — E78.49 OTHER HYPERLIPIDEMIA: ICD-10-CM

## 2023-02-16 RX ORDER — ATORVASTATIN CALCIUM 10 MG/1
TABLET, FILM COATED ORAL
Qty: 90 TABLET | Refills: 0 | Status: SHIPPED | OUTPATIENT
Start: 2023-02-16

## 2023-03-31 ENCOUNTER — OFFICE VISIT (OUTPATIENT)
Dept: INTERNAL MEDICINE | Facility: CLINIC | Age: 63
End: 2023-03-31
Payer: COMMERCIAL

## 2023-03-31 VITALS
WEIGHT: 173 LBS | TEMPERATURE: 98 F | BODY MASS INDEX: 30.16 KG/M2 | SYSTOLIC BLOOD PRESSURE: 150 MMHG | RESPIRATION RATE: 16 BRPM | HEART RATE: 96 BPM | DIASTOLIC BLOOD PRESSURE: 88 MMHG

## 2023-03-31 DIAGNOSIS — M79.671 INFLAMMATORY HEEL PAIN, RIGHT: Primary | ICD-10-CM

## 2023-03-31 DIAGNOSIS — M25.561 ACUTE PAIN OF RIGHT KNEE: ICD-10-CM

## 2023-03-31 PROCEDURE — 99214 OFFICE O/P EST MOD 30 MIN: CPT | Performed by: NURSE PRACTITIONER

## 2023-03-31 RX ORDER — METHYLPREDNISOLONE 4 MG/1
TABLET ORAL
Qty: 21 TABLET | Refills: 0 | Status: SHIPPED | OUTPATIENT
Start: 2023-03-31

## 2023-03-31 NOTE — PROGRESS NOTES
"Chief Complaint  Cyst (Under knee and behind leg)    Subjective          Roz Randolph presents to [facility]  History of Present Illness      The patient presents today for an office visit.    Pain of right knee  The patient reports that she has a \"knot\" on her right lower extremity, just below her right knee. She mentions that she has been experiencing discomfort for approximately 1 month. She states that she occasionally experiences paresthesia while ambulating and intermittent pain. The patient mentions that her symptoms do not improve with rest. She states that her symptoms wake her up at night. She reports that she has taken some Advil to treat her right knee pain but is not sure it has helped. 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    Pain of posterior right ankle  The patient states that she currently has a bump located on her posterior right ankle, by her Achilles tendon. She adds that it has been present for approximately 3 weeks. She reports experiencing pain and discomfort when pressure is applied to the bump. The patient states that the pain occasionally wakes her up at night. She reports experiencing pain while ambulating at times. She denies any decreased range of motion of her right ankle. She denies taking Advil or Naprosyn for her posterior right ankle pain. She denies any crepitus or previous bump of her bilateral posterior ankles. The patient mentions that she often stands for a prolonged period of time. She adds that she wears supportive tennis shoes to work.    The patient reports that her right knee and posterior right ankle symptoms occur independently of each other. She denies experiencing any of these symptoms in the past. She denies any known trauma to her right lower extremity. She denies any warmth or erythema of the affected areas. She states " that she is not sure if she has experienced edema of her right lower extremity. The patient mentions that she has a positive family history for arthritis; however, she cannot recall the type of arthritis at this time. She notes that Aleve has caused her to experience abdominal symptoms in the past.    The patient was previously seen by orthopedic surgeon Dr. Mando Seth in 2017 for right hallux pain secondary to trauma.    Current Outpatient Medications:   •  acyclovir (Zovirax) 800 MG tablet, Take 1 tablet by mouth 5 (Five) Times a Day. Take no more than 5 doses a day., Disp: 35 tablet, Rfl: 0  •  atorvastatin (LIPITOR) 10 MG tablet, TAKE ONE TABLET BY MOUTH DAILY, Disp: 90 tablet, Rfl: 0  •  buPROPion XL (WELLBUTRIN XL) 150 MG 24 hr tablet, TAKE ONE TABLET BY MOUTH EVERY MORNING, Disp: 90 tablet, Rfl: 1  •  Cholecalciferol (VITAMIN D3) 25 MCG (1000 UT) capsule, Take  by mouth., Disp: , Rfl:   •  fluticasone (Flonase) 50 MCG/ACT nasal spray, 2 sprays into the nostril(s) as directed by provider Daily As Needed for Allergies., Disp: 16 g, Rfl: 5  •  levocetirizine (XYZAL) 5 MG tablet, Take 1 tablet by mouth Daily As Needed for Allergies., Disp: 30 tablet, Rfl: 5  •  levothyroxine (SYNTHROID, LEVOTHROID) 100 MCG tablet, Take 1 tablet by mouth Daily., Disp: 30 tablet, Rfl: 5  •  losartan-hydrochlorothiazide (HYZAAR) 100-25 MG per tablet, TAKE ONE TABLET BY MOUTH DAILY, Disp: 90 tablet, Rfl: 1  •  methylPREDNISolone (MEDROL) 4 MG dose pack, Take as directed on package instructions., Disp: 21 tablet, Rfl: 0       Objective   Vital Signs:   /88 (BP Location: Right arm, Patient Position: Sitting, Cuff Size: Adult)   Pulse 96   Temp 98 °F (36.7 °C) (Infrared)   Resp 16   Wt 78.5 kg (173 lb)   BMI 30.16 kg/m²     Physical Exam  Vitals and nursing note reviewed.   Constitutional:       General: She is not in acute distress.     Appearance: Normal appearance. She is well-developed. She is not ill-appearing.    HENT:      Head: Normocephalic and atraumatic.   Eyes:      General: No scleral icterus.  Neck:      Thyroid: No thyromegaly.   Cardiovascular:      Rate and Rhythm: Normal rate and regular rhythm.   Pulmonary:      Effort: Pulmonary effort is normal.      Breath sounds: Normal breath sounds.   Abdominal:      General: Bowel sounds are normal. There is no distension.      Palpations: Abdomen is soft.      Tenderness: There is no abdominal tenderness.   Musculoskeletal:         General: Normal range of motion.      Cervical back: Normal range of motion.      Right foot: Normal range of motion.      Left foot: Normal range of motion.      Comments: Tender to palpate just below the right knee laterally no erythema edema warmth is appreciated.  Patient has free range of motion.  Negative valgus varus negative anterior posterior drawer sign.  Right Achilles noted a discrete annular area that is tender to palpate approximately 1.5 inches no erythema or edema is noted.  Patient does have pain with range of motion.  She is able to bear weight.   Feet:      Comments: Bump present on posterior right ankle  Lymphadenopathy:      Cervical: No cervical adenopathy.   Skin:     Capillary Refill: Capillary refill takes 2 to 3 seconds.      Coloration: Skin is not pale.   Neurological:      Mental Status: She is alert and oriented to person, place, and time.   Psychiatric:         Mood and Affect: Mood normal.         Behavior: Behavior normal.         Thought Content: Thought content normal.        Result Review :                 Assessment and Plan    Diagnoses and all orders for this visit:    1. Inflammatory heel pain, right (Primary)  -     methylPREDNISolone (MEDROL) 4 MG dose pack; Take as directed on package instructions.  Dispense: 21 tablet; Refill: 0  -     Ambulatory Referral to Orthopedic Surgery    2. Acute pain of right knee  -     methylPREDNISolone (MEDROL) 4 MG dose pack; Take as directed on package instructions.   Dispense: 21 tablet; Refill: 0  -     Ambulatory Referral to Orthopedic Surgery  -     Ambulatory Referral to Physical Therapy Evaluate and treat        Right heel pain likely related to cyst will have her see Ortho for further eval and treatment.  Would like for her to see physical therapy for her knee pain she is agreeable.      Pain of right lower extremity  - The patient will be referred to orthopedic surgery for further evaluation.  - She is encouraged to continue taking Advil. She has been instructed to take 2 Advil with a meal twice a day for 5 to 7 days.  - Medrol Dosepak has been prescribed. The patient has been instructed to take the medication with a meal. Potential side effects have been discussed.  - Applying ice to the affected area several times a day is recommended.    Follow Up   No follow-ups on file.  Patient was given instructions and counseling regarding her condition or for health maintenance advice. Please see specific information pulled into the AVS if appropriate.     .Transcribed from ambient dictation for CONNIE Jane by Melida Armenta.  03/31/23   16:05 EDT    Patient or patient representative verbalized consent to the visit recording.  I have personally performed the services described in this document as transcribed by the above individual, and it is both accurate and complete.    RTC/call  If symptoms worsen  Meds MOA and SE's reviewed and pt v/u    CONNIE Jane [facility]  [address] [address]  Fax [phone]  Phone [phone]

## 2023-04-05 NOTE — TELEPHONE ENCOUNTER
Roz notified rx sent to pharmacy    Scheduled appt for October appointment  Verb understanding given    Quantity Per Injection Site (Units Or Cc): 5 U

## 2023-04-13 ENCOUNTER — OFFICE VISIT (OUTPATIENT)
Dept: ORTHOPEDIC SURGERY | Facility: CLINIC | Age: 63
End: 2023-04-13
Payer: COMMERCIAL

## 2023-04-13 VITALS
HEIGHT: 64 IN | BODY MASS INDEX: 29.55 KG/M2 | SYSTOLIC BLOOD PRESSURE: 158 MMHG | WEIGHT: 173.06 LBS | DIASTOLIC BLOOD PRESSURE: 80 MMHG

## 2023-04-13 DIAGNOSIS — M76.61 ACHILLES TENDINITIS OF RIGHT LOWER EXTREMITY: ICD-10-CM

## 2023-04-13 DIAGNOSIS — M25.571 RIGHT ANKLE PAIN, UNSPECIFIED CHRONICITY: ICD-10-CM

## 2023-04-13 DIAGNOSIS — M84.369A STRESS FRACTURE OF TIBIA DUE TO MULTIPLE OR REPETITIVE STRESS, INITIAL ENCOUNTER: ICD-10-CM

## 2023-04-13 DIAGNOSIS — M25.561 RIGHT KNEE PAIN, UNSPECIFIED CHRONICITY: Primary | ICD-10-CM

## 2023-04-13 RX ORDER — FLUTICASONE PROPIONATE 50 MCG
2 SPRAY, SUSPENSION (ML) NASAL DAILY
COMMUNITY

## 2023-04-13 RX ORDER — NAPROXEN 500 MG/1
500 TABLET ORAL 2 TIMES DAILY WITH MEALS
Qty: 60 TABLET | Refills: 1 | Status: SHIPPED | OUTPATIENT
Start: 2023-04-13

## 2023-04-13 NOTE — PROGRESS NOTES
[facility] Office Visit     Office Visit       Date: 04/13/2023   Patient Name: Roz Randolph  MRN: [id number]  YOB: 1960    Referring Physician: Toma Boo APRN     Chief Complaint:   Chief Complaint   Patient presents with   • Right Tibia - Pain       History of Present Illness:   Roz Randolph is a 62 y.o. female who presents with left knee and ankle pain for 2 month(s). Onset atraumatic and gradual in nature. Pain is localized to the anterior knee and is a 2/10 on the pain scale. Pain is described as dull and burning. Associated symptoms include pain and swelling. The pain is worse with walking, standing, sitting and climbing stairs; ice and pain medication and/or NSAID make it better. Previous treatments have included: NSAIDS and oral steroids since symptom onset. Although some transient relief was reported with these interventions, these conservative measures have failed and symptoms have persisted. The patient is limited in daily activities and has had a significant decrease in quality of life as a result. She denies fevers, chills, or constitutional symptoms.    Subjective   Review of Systems: Review of Systems   Constitutional: Negative for chills, fever, unexpected weight gain and unexpected weight loss.   HENT: Negative for congestion, postnasal drip and rhinorrhea.    Eyes: Negative for blurred vision.   Respiratory: Negative for shortness of breath.    Cardiovascular: Negative for leg swelling.   Gastrointestinal: Negative for abdominal pain, nausea and vomiting.   Genitourinary: Negative for difficulty urinating.   Musculoskeletal: Positive for arthralgias. Negative for gait problem, joint swelling and myalgias.   Skin: Negative for skin lesions and wound.   Neurological: Negative for dizziness, weakness, light-headedness and numbness.   Hematological: Does not bruise/bleed easily.   Psychiatric/Behavioral: Negative for  depressed mood.        I have reviewed the following portions of the patient's history:History of Present Illness and review of systems.    Past Medical History:   Past Medical History:   Diagnosis Date   • Abnormal liver enzymes     11/2020-  Elevated liver panel significant for positive ARSH, elevated AST/ALT/GGT/and Ferritin   • Acute diverticulitis 09/2013    sigmoid colon dx by CT (also episodes 6/14, 9/14, and 12/14)   • Anxiety disorder    • Closed fracture of distal phalanx or phalanges of hand 11/2013    Of the fourth finger, right   • Closed head injury 06/2014    CT negative   • COVID-19 virus infection 12/31/2021   • Diverticulosis of large intestine    • Encounter for screening for cardiovascular disorders 03/28/2022    Coronary Artery Calcium Score 2   • Fracture of wrist 03/2008   • Gastroesophageal reflux disease    • Heart murmur    • History of esophagogastroduodenoscopy (EGD) 12/21/2015    small hiatal hernia, mild duodenitis, and concentric espohageal rings suggestive of esophsgitis- path c/w mild chronic esophagitis w/ increased eosinophils suggestive of reflux (Fadi)   • History of varicella    • Hyperlipidemia     Description: dx 7/08.   • Hypertension     Description: dx approx 2000.   • Hypothyroidism     Description: dx 4/14.   • Lung nodule     Description: 9/13- RLL (4 mm) nodule).  5/2/14- stable.   • Screening for cardiovascular condition 03/28/2022    Coronary Artery Calcium Score 2   • Vitamin D deficiency     Dx 10/19       Past Surgical History:   Past Surgical History:   Procedure Laterality Date   • APPENDECTOMY  1952   • CHOLECYSTECTOMY  1993   • SUBTOTAL COLECTOMY  04/13/2018    Robotic LAR (diverticulitis)   • TOTAL ABDOMINAL HYSTERECTOMY WITH SALPINGO OOPHORECTOMY Bilateral 02/2004       Family History:   Family History   Problem Relation Age of Onset   • Diabetes Mother    • Hypertension Mother    • Hypothyroidism Mother    • Coronary artery disease Father 47        MI age  47 and 65   • Hypertension Father    • Hypothyroidism Sister    • Diabetes Maternal Aunt    • Breast cancer Maternal Aunt    • Coronary artery disease Maternal Grandmother    • Diabetes Maternal Grandmother    • Colon cancer Paternal Grandmother    • Coronary artery disease Paternal Grandfather    • Hypothyroidism Sister    • Ovarian cancer Neg Hx        Social History:   Social History     Socioeconomic History   • Marital status:    • Number of children: 2   Tobacco Use   • Smoking status: Never   • Smokeless tobacco: Never   Vaping Use   • Vaping Use: Never used   Substance and Sexual Activity   • Alcohol use: Yes     Alcohol/week: 3.0 standard drinks     Types: 3 Glasses of wine per week     Comment: 2-3 glasses of wine weekends   • Drug use: No   • Sexual activity: Yes     Partners: Male     Birth control/protection: Surgical       Medications:   Current Outpatient Medications:   •  acyclovir (Zovirax) 800 MG tablet, Take 1 tablet by mouth 5 (Five) Times a Day. Take no more than 5 doses a day., Disp: 35 tablet, Rfl: 0  •  atorvastatin (LIPITOR) 10 MG tablet, TAKE ONE TABLET BY MOUTH DAILY, Disp: 90 tablet, Rfl: 0  •  buPROPion XL (WELLBUTRIN XL) 150 MG 24 hr tablet, TAKE ONE TABLET BY MOUTH EVERY MORNING, Disp: 90 tablet, Rfl: 1  •  Cholecalciferol (VITAMIN D3) 25 MCG (1000 UT) capsule, Take  by mouth., Disp: , Rfl:   •  fluticasone (FLONASE) 50 MCG/ACT nasal spray, 2 sprays into the nostril(s) as directed by provider Daily., Disp: , Rfl:   •  levocetirizine (XYZAL) 5 MG tablet, Take 1 tablet by mouth Daily As Needed for Allergies., Disp: 30 tablet, Rfl: 5  •  levothyroxine (SYNTHROID, LEVOTHROID) 100 MCG tablet, Take 1 tablet by mouth Daily., Disp: 30 tablet, Rfl: 5  •  losartan-hydrochlorothiazide (HYZAAR) 100-25 MG per tablet, TAKE ONE TABLET BY MOUTH DAILY, Disp: 90 tablet, Rfl: 1  •  methylPREDNISolone (MEDROL) 4 MG dose pack, Take as directed on package instructions., Disp: 21 tablet, Rfl: 0  •   "naproxen (EC NAPROSYN) 500 MG EC tablet, Take 1 tablet by mouth 2 (Two) Times a Day With Meals., Disp: 60 tablet, Rfl: 1    Allergies:   Allergies   Allergen Reactions   • Augmentin [Amoxicillin-Pot Clavulanate] Rash   • Morphine And Related Rash   • Tetracyclines & Related Rash, GI Intolerance and Swelling       I reviewed the patient's chief complaint, history of present illness, review of systems, past medical history, surgical history, family history, social history, medications and allergy list.     Objective    Vital Signs:   Vitals:    04/13/23 1435   BP: 158/80   Weight: 78.5 kg (173 lb 1 oz)   Height: 161.3 cm (63.5\")     Body mass index is 30.17 kg/m².   BMI is >= 30 and <35. (Class 1 Obesity). The following options were offered after discussion;: exercise counseling/recommendations and nutrition counseling/recommendations     Patient reports that she is a non-smoker and has not ever been a smoker.  This behavior was applauded and she was encouraged to continue in smoking cessation.  We will continue to monitor at subsequent visits.    Ortho Exam:  Constitutional: General Appearance: healthy-appearing, NAD, and normal body habitus.   Gait and Station: Appearance: limp, antalgic gait, and ambulates with crutches.   Cardiovascular System: Arterial Pulses Right: dorsalis pedis normal and posterior tibialis normal. Arterial Pulses Left: posterior tibialis normal. Edema Right: none. Edema Left: none. Varicosities Right: capillary refill test normal. Varicosities Left: capillary refill test normal.   Lower Legs: Inspection Right: no mass, induration, warmth, erythema, swelling, or ecchymosis. Bony Palpation Right: no tenderness of the proximal tibia, the distal tibia, the proximal fibula, the mid fibula, or the distal fibula and tenderness of the mid tibia medial (anterior). Bony Palpation Left: no tenderness of the proximal tibia, the mid tibia, the distal tibia, the proximal fibula, the mid fibula, or the " distal fibula. Soft Tissue Palpation Right: no tenderness of the lateral tibial muscle compartment, the posterior tibial muscle compartment, the gastrocnemius, or the achilles tendon and tenderness of the anterior tibial muscle compartment. Soft Tissue Palpation Left: no tenderness of the anterior tibial muscle compartment, the lateral tibial muscle compartment, the posterior tibial muscle compartment, the gastrocnemius, or the achilles tendon. Active Range of Motion Right: knee flexion normal and extension normal and ankle dorsiflexion normal, plantar flexion normal, inversion normal, and eversion normal. Active Range of Motion Left: knee flexion normal and extension normal and ankle dorsiflexion normal, plantar flexion normal, inversion normal, and eversion normal. Stability Right: anterior drawer negative. Stability Left: anterior drawer negative. Right Lower Leg Strength extensor hallucis longus 5/5 and digitorum longus 5/5; peroneus longus 5/5 and brevis 5/5; knee flexion 5/5 and extension 5/5; and lateral rotation of flexed leg 5/5, medial rotation of flexed leg 5/5, tibialis anterior 5/5, posterior tibialis 5/5, and gastrocnemius 5/5. Left Lower Leg Strength extensor hallucis longus 5/5 and digitorum longus 5/5; peroneus longus 5/5 and brevis 5/5; knee flexion 5/5 and extension 5/5; and lateral rotation of flexed leg 5/5, medial rotation of flexed leg 5/5, tibialis anterior 5/5, posterior tibialis 5/5, and gastrocnemius 5/5.   Skin: Right Lower Extremity: normal. Left Lower Extremity: normal.   Neurologic: Sensation on the Right: L2 normal, L3 normal, L4 normal, L5 normal, S1 normal, S2 normal, and S3,4,5 normal. Sensation on the Left: L2 normal, L3 normal, L4 normal, L5 normal, S1 normal, S2 normal, and S3,4,5 normal.   Psychiatric: Mood and Affect: normal affect and mood and active and alert.    Results Review:   Imaging Results (Last 24 Hours)     Procedure Component Value Units Date/Time    XR Tibia  Fibula 2 View Right [[id number]] Resulted: 04/13/23 1532     Updated: 04/13/23 1534    Narrative:      Indication: Right leg pain    Views: AP, lateral views of the tibia and fibula are submitted.     Impression: There is no fracture subluxation or dislocation. There are no   acute findings.     Comparison: No additional images available for comparison review.           Procedures    Assessment / Plan    Assessment/Plan:   Diagnoses and all orders for this visit:    1. Right knee pain, unspecified chronicity (Primary)  -     XR Tibia Fibula 2 View Right    2. Right ankle pain, unspecified chronicity  -     XR Tibia Fibula 2 View Right    3. Stress fracture of tibia due to multiple or repetitive stress, initial encounter    4. Achilles tendinitis of right lower extremity  -     naproxen (EC NAPROSYN) 500 MG EC tablet; Take 1 tablet by mouth 2 (Two) Times a Day With Meals.  Dispense: 60 tablet; Refill: 1    Multiple months of worsening double anterior tibia pain that is made worse by increased weightbearing activity.  She also has pain at night that wakes her from sleep.  This affected her gait and she subsequent developed Achilles tendinitis.  Most tender over the mid substance.  Not tender at the insertion of the calcaneus.  She does a lot of standing at work.  Radiographs do not show any acute or degenerative changes.  No obvious fracture lines.  I believe that developing tibia stress injury/fracture is the most likely explanation for her symptoms.  However, she has responded well to Medrol Dosepak.  Her symptoms are manageable at this time.  No limiting pain.  Today, I will place her on an anti-inflammatory medication.  Discussed worsening symptoms advised her to call back if the symptoms over the tibia return.  I would then obtain additional imaging including MRI of the tibia.  I will start her today on naproxen that she can take as needed for pain control.  I have to see her back in any point but follow-up will  be as needed moving forward.    Previous documentation reviewed: 3/31/2023-CONNIE Ny-office visit.    Previous laboratory results reviewed: 12/12/2022-creatinine 0.84, EGFR 79.7, TSH 4.650.    Follow Up:   Return if symptoms worsen or fail to improve.      Jose Verdugo MD  [facility]

## 2023-04-24 ENCOUNTER — TELEMEDICINE (OUTPATIENT)
Dept: INTERNAL MEDICINE | Facility: CLINIC | Age: 63
End: 2023-04-24
Payer: COMMERCIAL

## 2023-04-24 ENCOUNTER — TELEPHONE (OUTPATIENT)
Dept: INTERNAL MEDICINE | Facility: CLINIC | Age: 63
End: 2023-04-24

## 2023-04-24 DIAGNOSIS — J01.00 ACUTE NON-RECURRENT MAXILLARY SINUSITIS: Primary | ICD-10-CM

## 2023-04-24 DIAGNOSIS — J30.9 ALLERGIC RHINITIS, UNSPECIFIED SEASONALITY, UNSPECIFIED TRIGGER: ICD-10-CM

## 2023-04-24 DIAGNOSIS — R05.1 ACUTE COUGH: ICD-10-CM

## 2023-04-24 DIAGNOSIS — Z71.84 ENCOUNTER FOR HEALTH COUNSELING RELATED TO TRAVEL: ICD-10-CM

## 2023-04-24 PROCEDURE — 99214 OFFICE O/P EST MOD 30 MIN: CPT | Performed by: NURSE PRACTITIONER

## 2023-04-24 RX ORDER — METHYLPREDNISOLONE 4 MG/1
TABLET ORAL
Qty: 21 TABLET | Refills: 0 | Status: SHIPPED | OUTPATIENT
Start: 2023-04-24

## 2023-04-24 RX ORDER — CIPROFLOXACIN 500 MG/1
500 TABLET, FILM COATED ORAL 2 TIMES DAILY
Qty: 20 TABLET | Refills: 0 | Status: SHIPPED | OUTPATIENT
Start: 2023-04-24

## 2023-04-24 RX ORDER — DEXTROMETHORPHAN HYDROBROMIDE AND PROMETHAZINE HYDROCHLORIDE 15; 6.25 MG/5ML; MG/5ML
5 SYRUP ORAL 4 TIMES DAILY PRN
Qty: 100 ML | Refills: 0 | Status: SHIPPED | OUTPATIENT
Start: 2023-04-24

## 2023-04-24 RX ORDER — BENZONATATE 100 MG/1
100 CAPSULE ORAL 3 TIMES DAILY PRN
Qty: 30 CAPSULE | Refills: 0 | Status: SHIPPED | OUTPATIENT
Start: 2023-04-24

## 2023-04-24 NOTE — PROGRESS NOTES
Patient Name: Roz Randolph  : 1960   MRN: [id number]     Chief Complaint: allergies and cough.     You have chosen to receive care through a telehealth visit. Patient consents to video/audio connection for your medical care today.     Patient is home and provider is at [facility] [place] [id number].       History of Present Illness: Roz Randolph is a 62 y.o. female   for telehealth.  Patient has complaints of allergy symptoms associated with itchy, watery eyes, cough, sore throat, headache,  sinus pressure/pain for a few weeks.   Denies fever, wheezing, or shortness of breath. Cough is worsening.  There have been no sick contacts. Patient had a negative home Covid test. No loss of taste or smell. Patient has tried over-the-counter medications including pataday, flonase, and xyzal.     Patient is traveling on Thursday and requesting ciprofloxacin for her travel due to her history of diverticulitis.      Subjective     Review of System: Review of Systems   A review of systems was performed, and the pertinent positives are noted in the HPI.      Medications:     Current Outpatient Medications:   •  atorvastatin (LIPITOR) 10 MG tablet, TAKE ONE TABLET BY MOUTH DAILY, Disp: 90 tablet, Rfl: 0  •  benzonatate (Tessalon Perles) 100 MG capsule, Take 1 capsule by mouth 3 (Three) Times a Day As Needed for Cough., Disp: 30 capsule, Rfl: 0  •  buPROPion XL (WELLBUTRIN XL) 150 MG 24 hr tablet, TAKE ONE TABLET BY MOUTH EVERY MORNING, Disp: 90 tablet, Rfl: 1  •  Cholecalciferol (VITAMIN D3) 25 MCG (1000 UT) capsule, Take  by mouth., Disp: , Rfl:   •  ciprofloxacin (Cipro) 500 MG tablet, Take 1 tablet by mouth 2 (Two) Times a Day., Disp: 20 tablet, Rfl: 0  •  fluticasone (FLONASE) 50 MCG/ACT nasal spray, 2 sprays into the nostril(s) as directed by provider Daily., Disp: , Rfl:   •  levocetirizine (XYZAL) 5 MG tablet, Take 1 tablet by mouth Daily As  Needed for Allergies., Disp: 30 tablet, Rfl: 5  •  levothyroxine (SYNTHROID, LEVOTHROID) 100 MCG tablet, Take 1 tablet by mouth Daily., Disp: 30 tablet, Rfl: 5  •  losartan-hydrochlorothiazide (HYZAAR) 100-25 MG per tablet, TAKE ONE TABLET BY MOUTH DAILY, Disp: 90 tablet, Rfl: 1  •  methylPREDNISolone (MEDROL) 4 MG dose pack, Take as directed on package instructions., Disp: 21 tablet, Rfl: 0  •  naproxen (EC NAPROSYN) 500 MG EC tablet, Take 1 tablet by mouth 2 (Two) Times a Day With Meals., Disp: 60 tablet, Rfl: 1  •  promethazine-dextromethorphan (PROMETHAZINE-DM) 6.25-15 MG/5ML syrup, Take 5 mL by mouth 4 (Four) Times a Day As Needed for Cough., Disp: 100 mL, Rfl: 0    Allergies:   Allergies   Allergen Reactions   • Augmentin [Amoxicillin-Pot Clavulanate] Rash   • Morphine And Related Rash   • Tetracyclines & Related Rash, GI Intolerance and Swelling       Objective     Physical Exam:   Vital Signs: There were no vitals filed for this visit.  There is no height or weight on file to calculate BMI. Physical Exam  Constitutional:       Appearance: She is not ill-appearing.   HENT:      Nose: Congestion present.   Pulmonary:      Effort: Pulmonary effort is normal.   Psychiatric:         Mood and Affect: Mood normal.         Assessment / Plan      Assessment/Plan:   Diagnoses and all orders for this visit:    1. Acute non-recurrent maxillary sinusitis (Primary)  -     methylPREDNISolone (MEDROL) 4 MG dose pack; Take as directed on package instructions.  Dispense: 21 tablet; Refill: 0    2. Acute cough  -     benzonatate (Tessalon Perles) 100 MG capsule; Take 1 capsule by mouth 3 (Three) Times a Day As Needed for Cough.  Dispense: 30 capsule; Refill: 0  -     promethazine-dextromethorphan (PROMETHAZINE-DM) 6.25-15 MG/5ML syrup; Take 5 mL by mouth 4 (Four) Times a Day As Needed for Cough.  Dispense: 100 mL; Refill: 0    3. Encounter for health counseling related to travel  -     ciprofloxacin (Cipro) 500 MG tablet; Take  1 tablet by mouth 2 (Two) Times a Day.  Dispense: 20 tablet; Refill: 0    4. Allergic rhinitis, unspecified seasonality, unspecified trigger  -     methylPREDNISolone (MEDROL) 4 MG dose pack; Take as directed on package instructions.  Dispense: 21 tablet; Refill: 0       Patient encouraged to start steroids. She will call me if no improvement in 2 to 3 days.  She was instructed to take them with food.  If no improvement over the next 2 days she can give me a call and we will try an antibiotic.  Prescribed ciprofloxacin to take on her trip due to her history of diverticulitis.  If she takes antibiotics, I have encouraged her to take it with probiotic.  I explained the risk of tendinitis with ciprofloxacin and steroids.  She verbalized understanding and agreement with plan of care.    I discussed that assessment is limited due to video visit and if patient does not improve they would need to come into the clinic for evaluation.  If any problems or concerns follow-up in clinic.  Patient verbalized understanding.      35 min visit  Follow Up:   [facility]

## 2023-04-26 ENCOUNTER — TELEPHONE (OUTPATIENT)
Dept: INTERNAL MEDICINE | Facility: CLINIC | Age: 63
End: 2023-04-26
Payer: COMMERCIAL

## 2023-04-26 DIAGNOSIS — J01.80 OTHER ACUTE SINUSITIS, RECURRENCE NOT SPECIFIED: Primary | ICD-10-CM

## 2023-04-26 RX ORDER — CEFDINIR 300 MG/1
300 CAPSULE ORAL 2 TIMES DAILY
Qty: 20 CAPSULE | Refills: 0 | Status: SHIPPED | OUTPATIENT
Start: 2023-04-26 | End: 2023-05-06

## 2023-04-26 NOTE — TELEPHONE ENCOUNTER
Caller: Agustín Ana    Relationship: Self    Best call back number:[phone]    What medication are you requesting: ANTIBIOTICS    What are your current symptoms: CONGESTION    How long have you been experiencing symptoms: A FEW WEEKS    Have you had these symptoms before:    [x] Yes  [] No    Have you been treated for these symptoms before:   [x] Yes  [] No    If a prescription is needed, what is your preferred pharmacy and phone number: [facility] [id number] [address]  AT [facility] [address] [facility] - [phone]  - [phone] FX     Additional notes:FELICIA STATED THAT DURING THE PATIENTS LAST APPOINTMENT, THAT IF HER SYMTOMS HAD NOT GOTTEN ANY BETTER BY 04/26/23, TO CALL AND SHE WOULD ISSUE MORE MEDICATION. THE PATIENTS COUGH IS A LITTLE BETTER BUT NOT MUCH.

## 2023-04-26 NOTE — TELEPHONE ENCOUNTER
Called and notified patient that prescription was sent to pharmacy. Verbalized appreciation and understanding

## 2023-05-12 ENCOUNTER — TELEPHONE (OUTPATIENT)
Dept: INTERNAL MEDICINE | Facility: CLINIC | Age: 63
End: 2023-05-12
Payer: COMMERCIAL

## 2023-05-12 NOTE — TELEPHONE ENCOUNTER
Provider: DR. GUERRERO     Caller: SAKSHI SANCHEZ     Phone Number: [phone]    Reason for Call: PATIENTS  IS CALLING AND WOULD LIKE TO SEE IF SHE CAN GET THIS MEDICATION CALLED IN FOR HER

## 2023-05-12 NOTE — TELEPHONE ENCOUNTER
Caller: Agustín Roz Martines    Relationship: Self    Best call back number: [phone]    What medication are you requesting: MEDICATION FOR COVID POSITIVE    What are your current symptoms: SORE THROAT-COUGH-HEADACHE-CONGESTION    How long have you been experiencing symptoms: 2 DAYS    Have you had these symptoms before:    [] Yes  [x] No    Have you been treated for these symptoms before:   [] Yes  [x] No    If a prescription is needed, what is your preferred pharmacy and phone number: [facility] [id number] - [place] [id number] AT [facility] [address] - [phone]  - [phone] FX     Additional notes: PATIENT TESTED POSITIVE FOR COVID THIS MORNING WITH AT HOME TEST.

## 2023-05-12 NOTE — TELEPHONE ENCOUNTER
Call patient please.    If they are referring to Paxlovid, she actually does not meet the criteria for treatment for it.

## 2023-05-14 DIAGNOSIS — E78.49 OTHER HYPERLIPIDEMIA: ICD-10-CM

## 2023-05-15 RX ORDER — ATORVASTATIN CALCIUM 10 MG/1
TABLET, FILM COATED ORAL
Qty: 90 TABLET | Refills: 0 | Status: SHIPPED | OUTPATIENT
Start: 2023-05-15

## 2023-06-12 DIAGNOSIS — I10 ESSENTIAL HYPERTENSION: ICD-10-CM

## 2023-06-12 DIAGNOSIS — F41.1 GENERALIZED ANXIETY DISORDER: ICD-10-CM

## 2023-06-12 DIAGNOSIS — J30.2 SEASONAL ALLERGIC RHINITIS, UNSPECIFIED TRIGGER: ICD-10-CM

## 2023-06-12 RX ORDER — LOSARTAN POTASSIUM AND HYDROCHLOROTHIAZIDE 25; 100 MG/1; MG/1
TABLET ORAL
Qty: 90 TABLET | Refills: 1 | Status: SHIPPED | OUTPATIENT
Start: 2023-06-12

## 2023-06-12 RX ORDER — BUPROPION HYDROCHLORIDE 150 MG/1
TABLET ORAL
Qty: 90 TABLET | Refills: 1 | Status: SHIPPED | OUTPATIENT
Start: 2023-06-12

## 2023-06-12 RX ORDER — LEVOCETIRIZINE DIHYDROCHLORIDE 5 MG/1
TABLET, FILM COATED ORAL
Qty: 90 TABLET | Refills: 0 | Status: SHIPPED | OUTPATIENT
Start: 2023-06-12

## 2023-08-13 DIAGNOSIS — E78.49 OTHER HYPERLIPIDEMIA: ICD-10-CM

## 2023-08-14 RX ORDER — ATORVASTATIN CALCIUM 10 MG/1
TABLET, FILM COATED ORAL
Qty: 90 TABLET | Refills: 0 | Status: SHIPPED | OUTPATIENT
Start: 2023-08-14

## 2023-09-01 ENCOUNTER — OFFICE VISIT (OUTPATIENT)
Dept: INTERNAL MEDICINE | Facility: CLINIC | Age: 63
End: 2023-09-01
Payer: COMMERCIAL

## 2023-09-01 VITALS
DIASTOLIC BLOOD PRESSURE: 90 MMHG | SYSTOLIC BLOOD PRESSURE: 154 MMHG | HEART RATE: 84 BPM | TEMPERATURE: 96.8 F | BODY MASS INDEX: 29.6 KG/M2 | WEIGHT: 173.4 LBS | HEIGHT: 64 IN | RESPIRATION RATE: 18 BRPM

## 2023-09-01 DIAGNOSIS — F41.1 GENERALIZED ANXIETY DISORDER: ICD-10-CM

## 2023-09-01 DIAGNOSIS — E55.9 VITAMIN D DEFICIENCY: ICD-10-CM

## 2023-09-01 DIAGNOSIS — E78.5 DYSLIPIDEMIA: ICD-10-CM

## 2023-09-01 DIAGNOSIS — E03.9 ACQUIRED HYPOTHYROIDISM: ICD-10-CM

## 2023-09-01 DIAGNOSIS — I10 PRIMARY HYPERTENSION: Primary | ICD-10-CM

## 2023-09-01 DIAGNOSIS — L30.9 ECZEMA, UNSPECIFIED TYPE: ICD-10-CM

## 2023-09-01 DIAGNOSIS — Z12.11 SCREENING FOR COLON CANCER: ICD-10-CM

## 2023-09-01 DIAGNOSIS — K21.9 GASTROESOPHAGEAL REFLUX DISEASE, UNSPECIFIED WHETHER ESOPHAGITIS PRESENT: ICD-10-CM

## 2023-09-01 LAB
ALBUMIN SERPL-MCNC: 4.3 G/DL (ref 3.5–5.2)
ALBUMIN/GLOB SERPL: 1.5 G/DL
ALP SERPL-CCNC: 51 U/L (ref 39–117)
ALT SERPL W P-5'-P-CCNC: 47 U/L (ref 1–33)
ANION GAP SERPL CALCULATED.3IONS-SCNC: 11.9 MMOL/L (ref 5–15)
AST SERPL-CCNC: 60 U/L (ref 1–32)
BASOPHILS # BLD AUTO: 0.06 10*3/MM3 (ref 0–0.2)
BASOPHILS NFR BLD AUTO: 0.7 % (ref 0–1.5)
BILIRUB BLD-MCNC: NEGATIVE MG/DL
BILIRUB SERPL-MCNC: 0.6 MG/DL (ref 0–1.2)
BUN SERPL-MCNC: 12 MG/DL (ref 8–23)
BUN/CREAT SERPL: 14.6 (ref 7–25)
CALCIUM SPEC-SCNC: 9.5 MG/DL (ref 8.6–10.5)
CHLORIDE SERPL-SCNC: 103 MMOL/L (ref 98–107)
CHOLEST SERPL-MCNC: 188 MG/DL (ref 0–200)
CLARITY, POC: CLEAR
CO2 SERPL-SCNC: 22.1 MMOL/L (ref 22–29)
COLOR UR: YELLOW
CREAT SERPL-MCNC: 0.82 MG/DL (ref 0.57–1)
DEPRECATED RDW RBC AUTO: 40.2 FL (ref 37–54)
EGFRCR SERPLBLD CKD-EPI 2021: 81.5 ML/MIN/1.73
EOSINOPHIL # BLD AUTO: 0.38 10*3/MM3 (ref 0–0.4)
EOSINOPHIL NFR BLD AUTO: 4.6 % (ref 0.3–6.2)
ERYTHROCYTE [DISTWIDTH] IN BLOOD BY AUTOMATED COUNT: 11.7 % (ref 12.3–15.4)
EXPIRATION DATE: ABNORMAL
GLOBULIN UR ELPH-MCNC: 2.9 GM/DL
GLUCOSE SERPL-MCNC: 97 MG/DL (ref 65–99)
GLUCOSE UR STRIP-MCNC: NEGATIVE MG/DL
HCT VFR BLD AUTO: 40.9 % (ref 34–46.6)
HDLC SERPL-MCNC: 53 MG/DL (ref 40–60)
HGB BLD-MCNC: 14.7 G/DL (ref 12–15.9)
IMM GRANULOCYTES # BLD AUTO: 0.03 10*3/MM3 (ref 0–0.05)
IMM GRANULOCYTES NFR BLD AUTO: 0.4 % (ref 0–0.5)
KETONES UR QL: NEGATIVE
LDLC SERPL CALC-MCNC: 111 MG/DL (ref 0–100)
LDLC/HDLC SERPL: 2.03 {RATIO}
LEUKOCYTE EST, POC: ABNORMAL
LYMPHOCYTES # BLD AUTO: 2.05 10*3/MM3 (ref 0.7–3.1)
LYMPHOCYTES NFR BLD AUTO: 24.7 % (ref 19.6–45.3)
Lab: ABNORMAL
MCH RBC QN AUTO: 34.1 PG (ref 26.6–33)
MCHC RBC AUTO-ENTMCNC: 35.9 G/DL (ref 31.5–35.7)
MCV RBC AUTO: 94.9 FL (ref 79–97)
MONOCYTES # BLD AUTO: 0.69 10*3/MM3 (ref 0.1–0.9)
MONOCYTES NFR BLD AUTO: 8.3 % (ref 5–12)
NEUTROPHILS NFR BLD AUTO: 5.09 10*3/MM3 (ref 1.7–7)
NEUTROPHILS NFR BLD AUTO: 61.3 % (ref 42.7–76)
NITRITE UR-MCNC: NEGATIVE MG/ML
NRBC BLD AUTO-RTO: 0 /100 WBC (ref 0–0.2)
PH UR: 5 [PH] (ref 5–8)
PLATELET # BLD AUTO: 248 10*3/MM3 (ref 140–450)
PMV BLD AUTO: 10.6 FL (ref 6–12)
POTASSIUM SERPL-SCNC: 3.9 MMOL/L (ref 3.5–5.2)
PROT SERPL-MCNC: 7.2 G/DL (ref 6–8.5)
PROT UR STRIP-MCNC: NEGATIVE MG/DL
RBC # BLD AUTO: 4.31 10*6/MM3 (ref 3.77–5.28)
RBC # UR STRIP: NEGATIVE /UL
SODIUM SERPL-SCNC: 137 MMOL/L (ref 136–145)
SP GR UR: 1.02 (ref 1–1.03)
T4 FREE SERPL-MCNC: 1.06 NG/DL (ref 0.93–1.7)
TRIGL SERPL-MCNC: 138 MG/DL (ref 0–150)
TSH SERPL DL<=0.05 MIU/L-ACNC: 2.15 UIU/ML (ref 0.27–4.2)
UROBILINOGEN UR QL: NORMAL
VLDLC SERPL-MCNC: 24 MG/DL (ref 5–40)
WBC NRBC COR # BLD: 8.3 10*3/MM3 (ref 3.4–10.8)

## 2023-09-01 PROCEDURE — 80050 GENERAL HEALTH PANEL: CPT | Performed by: INTERNAL MEDICINE

## 2023-09-01 PROCEDURE — 80061 LIPID PANEL: CPT | Performed by: INTERNAL MEDICINE

## 2023-09-01 PROCEDURE — 84439 ASSAY OF FREE THYROXINE: CPT | Performed by: INTERNAL MEDICINE

## 2023-09-01 RX ORDER — TRIAMCINOLONE ACETONIDE 0.25 MG/G
1 OINTMENT TOPICAL 3 TIMES DAILY
Qty: 30 G | Refills: 1 | Status: SHIPPED | OUTPATIENT
Start: 2023-09-01 | End: 2023-09-08

## 2023-09-01 NOTE — PROGRESS NOTES
"Subjective       Roz Randolph is a 63 y.o. female.     Chief Complaint   Patient presents with    Hypertension       History obtained from the patient.      History of Present Illness     The patient has seen Orthopedics for right ankle pain and was diagnosed with Achilles Tendinitis, prescribed Naprosyn.  She states it is no better.    The patient is complaining of a dry itchy rash on the second and third fingers of her right hand for 5 to 6 months.  She has tried multiple types of lotion and Aquaphor without relief.    Of note, the patient a chest CT on 2/1/2018 which showed an on calcified 4 mm Right Lower Lobe Lung Nodule, stable x 2 years, no further imaging recommended.     Primary Care Cardiac Diagnostic Constellation: The patient is here today for a follow-up visit.  Last follow-up was 12/12/2022.  Her Hypertension has been stable.   Medication: Losartan / HCTZ.   Her Hyperlipidemia has been stable.   Her LDL goal is < 130,  last LDL was 104, TG 68.   Medication: Atorvastatin  Side Effects: None.     Procedures: On 3/28/2022, Coronary Artery Calcium Score was 2.      Interval Events:  The patient states her blood pressure at home has been 120's / 80's.  They have had the blood pressure cuff checked when her  goes for dialysis and it has been accurate.  She gets nervous at the doctor's office.  She also states she is under increased stress, due to her 's health issues.     Symptoms: She has had some lightheadedness and dizziness this week, but states it has been a \"crazy week\".  She is under increased stress.  Denies chest pain, dyspnea, PORTILLO, orthopnea, PND, palpitations, syncope, lower extremity edema, and claudication.  Associated Symptoms: Weight up 5 pounds since 12/12/2022.  No fatigue, headache, polydipsia, polyuria, myalgias, arthralgias, visual impairment, memory loss, or concentration problems.    Lifestyle: She consumes a diverse and healthy diet, overall.  She walks 3 times per " week and is active at work.  Tobacco Use: Never a Smoker.      Hypothyroidism Follow-Up: The patient is being seen for follow-up of Hypothyroidism, which is stable.   Interval Events: On 12/12/2022, TSH was elevated (4.65) and T4 was normal (1.07).  Levothyroxine dose was increased to 100 mcg daily.   Symptoms:  Weight up 5 pounds since 12/12/2022.  Denies fatigue, heat / cold intolerance, constipation, diarrhea, memory loss, trouble concentrating, hair loss, and dry skin.   Associated Symptoms: no myalgias, arthralgias, or paresthesias.   Medications:  Levothyroxine (Synthroid).       GERD Follow-up: The patient is being seen for a routine clinic follow-up of Gastroesophageal Reflux Disease, which is stable.  Procedures:  EGD 12/21/15 (small hiatal hernia, mild duodenitis, and concentric espohageal rings suggestive of esophsgitis- path c/w mild chronic esophagitis w/ increased eosinophils suggestive of reflux ([place]).   Comorbid Illness: On 6/16/2020, liver enzymes were elevated ( and ALT 87). Elevated liver panel done on 11/19/2020 was significant for an elevated ARSH, GGT (249), Ferritin (778), ALT (80),  and AST (98 ).  Liver ultrasound was ordered on 1/25/2021, but not done.  She saw Dr. Masters on 1/28/2021.  He felt her ARSH was a false positive.  Additional labs were done on 8/25/2021 and were negative with the exception of an elevated AST (57) and ALT (68).  Last labs/ 12/12/22 were significant for elevated AST (40) and ALT (47)  Interval Events:  None.  Symptoms: She reports 2 episodes of mild to moderate rectal bleeding with bowel movements this past week.  Denies constipation.  No abdominal pain, heartburn, acid regurgitation, nausea, vomiting, dysphagia, odynophagia, hematemesis,  melena, early satiety, belching, and bloating.  Associated Symptoms:  No chronic cough, sore throat, hoarseness, or wheezing.   Medication: Tums prn.      Vitamin D Deficiency Follow-Up: The patient is being seen for  follow-up of Vitamin D Deficiency, which is stable.    Interval Events: Vitamin D level on 12/12/2022 was 40.7.   Symptoms:  No fatigue, myalgias, arthralgias, paresthesias, balance issues, or gait abnormality.  Medication:  Vitamin D3 (cholecalciferol) 2000 IU daily.  She has been out for the past 1 week.     Anxiety Disorder Follow-Up: The patient is being seen for follow-up of Anxiety, which is stable.  Comorbid Illnesses None.   Interval Events:  None.   Symptoms: Stable anxiety.  She has had sleep disruption due to taking care of her .  Denies depression, panic attacks, anhedonia, memory loss, and difficulty concentrating.  Associated Symptoms: no suicidal ideation or thoughts of self-harm.   Medication: Wellbutrin XL.   Side Effects: None.    Current Outpatient Medications on File Prior to Visit   Medication Sig Dispense Refill    atorvastatin (LIPITOR) 10 MG tablet TAKE ONE TABLET BY MOUTH DAILY 90 tablet 0    benzonatate (Tessalon Perles) 100 MG capsule Take 1 capsule by mouth 3 (Three) Times a Day As Needed for Cough. 30 capsule 0    buPROPion XL (WELLBUTRIN XL) 150 MG 24 hr tablet TAKE ONE TABLET BY MOUTH EVERY MORNING 90 tablet 1    Cholecalciferol (VITAMIN D3) 25 MCG (1000 UT) capsule Take  by mouth.      fluticasone (FLONASE) 50 MCG/ACT nasal spray 2 sprays into the nostril(s) as directed by provider Daily.      levocetirizine (XYZAL) 5 MG tablet TAKE ONE TABLET BY MOUTH DAILY AS NEEDED FOR ALLERGIES 90 tablet 0    levothyroxine (SYNTHROID, LEVOTHROID) 100 MCG tablet TAKE ONE TABLET BY MOUTH DAILY 90 tablet 1    losartan-hydrochlorothiazide (HYZAAR) 100-25 MG per tablet TAKE ONE TABLET BY MOUTH DAILY 90 tablet 1    naproxen (EC NAPROSYN) 500 MG EC tablet Take 1 tablet by mouth 2 (Two) Times a Day With Meals. 60 tablet 1    promethazine-dextromethorphan (PROMETHAZINE-DM) 6.25-15 MG/5ML syrup Take 5 mL by mouth 4 (Four) Times a Day As Needed for Cough. 100 mL 0    [DISCONTINUED] ciprofloxacin  "(Cipro) 500 MG tablet Take 1 tablet by mouth 2 (Two) Times a Day. 20 tablet 0    [DISCONTINUED] methylPREDNISolone (MEDROL) 4 MG dose pack Take as directed on package instructions. 21 tablet 0     No current facility-administered medications on file prior to visit.       Current outpatient and discharge medications have been reconciled for the patient.  Reviewed by: Maia Melton MD        The following portions of the patient's history were reviewed and updated as appropriate: allergies, current medications, past family history, past medical history, past social history, past surgical history, and problem list.    Review of Systems   Constitutional:  Positive for unexpected weight change. Negative for fatigue.   Eyes:  Negative for visual disturbance.   Respiratory:  Negative for cough, shortness of breath and wheezing.    Cardiovascular:  Negative for chest pain, palpitations and leg swelling.        No PORTILLO, orthopnea, or claudication.   Gastrointestinal:  Positive for blood in stool. Negative for abdominal pain, constipation, diarrhea, nausea and vomiting.        Denies melena.   Endocrine: Negative for polydipsia and polyuria.   Musculoskeletal:  Negative for arthralgias and myalgias.   Neurological:  Positive for dizziness and light-headedness. Negative for syncope and headaches.        No memory issues.   Psychiatric/Behavioral:  Positive for sleep disturbance. Negative for decreased concentration. The patient is nervous/anxious.        Objective       Blood pressure 154/90, pulse 84, temperature 96.8 øF (36 øC), temperature source Infrared, resp. rate 18, height 161.3 cm (63.5\"), weight 78.7 kg (173 lb 6.4 oz).  Body mass index is 30.23 kg/mý.      Physical Exam  Vitals and nursing note reviewed.   Constitutional:       Appearance: She is well-developed.      Comments: BMI greater than 30   Neck:      Thyroid: No thyroid mass or thyromegaly.      Vascular: No carotid bruit.   Cardiovascular:      Rate and " Rhythm: Normal rate and regular rhythm.      Pulses: Normal pulses.      Heart sounds: Normal heart sounds. No murmur heard.    No friction rub. No gallop.   Pulmonary:      Effort: Pulmonary effort is normal.      Breath sounds: Normal breath sounds.   Abdominal:      Comments: The patient declined a rectal exam today.   Musculoskeletal:      Right lower leg: No edema.      Left lower leg: No edema.   Skin:     Findings: Rash (dry, cracked, scaly area on right second and third fingers diffusely) present.   Neurological:      Mental Status: She is alert.   Psychiatric:         Mood and Affect: Mood normal.     Results for orders placed or performed in visit on 09/01/23   POC Urinalysis Dipstick, Automated    Specimen: Urine   Result Value Ref Range    Color Yellow Yellow, Straw, Dark Yellow, María Elena    Clarity, UA Clear Clear    Specific Gravity  1.020 1.005 - 1.030    pH, Urine 5.0 5.0 - 8.0    Leukocytes 25 Iris/ul (A) Negative    Nitrite, UA Negative Negative    Protein, POC Negative Negative mg/dL    Glucose, UA Negative Negative mg/dL    Ketones, UA Negative Negative    Urobilinogen, UA Normal Normal, 0.2 E.U./dL    Bilirubin Negative Negative    Blood, UA Negative Negative    Lot Number [id number]     Expiration Date 6/30/24        Assessment / Plan:  Diagnoses and all orders for this visit:    1. Primary hypertension (Primary)  -     POC Urinalysis Dipstick, Automated  -     Lipid Panel  -     Comprehensive Metabolic Panel  -     TSH  -     CBC & Differential   Continue current medication(s) as noted in the history of present illness.    2. Dyslipidemia  -     Lipid Panel  -     Comprehensive Metabolic Panel  -     TSH  -     CBC & Differential   Continue current medication(s) as noted in the history of present illness.    3. Acquired hypothyroidism  -     TSH  -     T4, Free   Continue current medication(s) as noted in the history of present illness.    4. Gastroesophageal reflux disease, unspecified whether  esophagitis present   Continue current medication(s) as noted in the history of present illness.    5. Vitamin D deficiency   Continue Vitamin D supplementation.    6. Generalized anxiety disorder   Continue current medication(s) as noted in the history of present illness.    7. Eczema, unspecified type  -     triamcinolone (KENALOG) 0.025 % ointment; Apply 1 application  topically to the appropriate area as directed 3 (Three) Times a Day for 7 days.  Dispense: 30 g; Refill: 1    8. Screening for colon cancer  -     Ambulatory Referral For Screening Colonoscopy        The patient agrees to call Orthopedic Surgery to further evaluate the right ankle pain.    I recommended Shingrix (new Shingles vaccine) at the pharmacy.  I also recommended the COVID-19 bivalent booster vaccine when available, and yearly Influenza vaccine.      Return in about 6 months (around 3/1/2024) for Annual physical, fasting.

## 2023-09-01 NOTE — PATIENT INSTRUCTIONS
Please call orthopedic surgery to further evaluate your right ankle pain, as we discussed.    I recommend Shingrix (new Shingles vaccine) at the pharmacy.  I also recommend the COVID-19 bivalent booster vaccine when available, and yearly Influenza vaccine.

## 2023-09-27 ENCOUNTER — TELEPHONE (OUTPATIENT)
Dept: ORTHOPEDIC SURGERY | Facility: CLINIC | Age: 63
End: 2023-09-27

## 2023-09-27 NOTE — TELEPHONE ENCOUNTER
Dr. Verdugo-please see message below and advise ordering MRI of tibia like previously discussed. Thanks!    Josué KEMP CMA ([facility]), ROT

## 2023-09-27 NOTE — TELEPHONE ENCOUNTER
Caller: JULIUS SANCHEZ    Relationship to Patient: the patient    Phone Number: [phone]    Reason for Call: PATIENT STATES DR. GUADARRAMA TOLD HER IF THE MED DIDN'T HELP WITH BACK OF RIGHT ANKLE PAIN PATIENT MAY NEED TO GET AN MRI. PATIENT IS CALLING FOR NEXT STEPS FROM DR. GUADARRAMA.

## 2023-09-27 NOTE — TELEPHONE ENCOUNTER
She has not been seen since 4/13. I need to see her again to know exactly the area of pain to determine what type of mri to order. Thanks.

## 2023-09-29 PROCEDURE — 87086 URINE CULTURE/COLONY COUNT: CPT | Performed by: FAMILY MEDICINE

## 2023-10-04 ENCOUNTER — OFFICE VISIT (OUTPATIENT)
Age: 63
End: 2023-10-04
Payer: COMMERCIAL

## 2023-10-04 VITALS
WEIGHT: 172 LBS | DIASTOLIC BLOOD PRESSURE: 85 MMHG | SYSTOLIC BLOOD PRESSURE: 160 MMHG | BODY MASS INDEX: 29.37 KG/M2 | HEIGHT: 64 IN

## 2023-10-04 DIAGNOSIS — M76.61 ACHILLES TENDINITIS OF RIGHT LOWER EXTREMITY: ICD-10-CM

## 2023-10-04 DIAGNOSIS — M47.9 SPONDYLOSIS: Primary | ICD-10-CM

## 2023-10-17 ENCOUNTER — TELEPHONE (OUTPATIENT)
Dept: INTERNAL MEDICINE | Facility: CLINIC | Age: 63
End: 2023-10-17
Payer: COMMERCIAL

## 2023-10-17 DIAGNOSIS — M54.50 ACUTE LOW BACK PAIN, UNSPECIFIED BACK PAIN LATERALITY, UNSPECIFIED WHETHER SCIATICA PRESENT: Primary | ICD-10-CM

## 2023-10-17 NOTE — TELEPHONE ENCOUNTER
Caller: Sakshi Randolph    Relationship: Emergency Contact    Best call back number:  [phone]     Who are you requesting to speak with (clinical staff, provider,  specific staff member): CLINICAL, NURSE       What was the call regarding: SAKSHI IS REQUESTING A CALL BACK FROM THE NURSE ABOUT HIS WIFE'S CRIPPLING BACK PAIN     SHE HAD TO COME HOME FROM WORK TODAY BECAUSE OF THE PAIN

## 2023-10-17 NOTE — TELEPHONE ENCOUNTER
Called patients  and he stated that she is in very intense pain in regards to her lower back. She was seen  at Urgent care on 09/29/23 and was given Methocrabamol and Prednisone to help treated the pain but he stated the Cortisone somewhat helped and the muscle relaxer did not help at all.  They did do x-ray's while they were there and scheduled for an MRI 11/10/23 but they need to know what to do to treat until then. I did schedule them for an appointment with Dr Melton on 10/18/23 to discuss.

## 2023-10-18 RX ORDER — HYDROCODONE BITARTRATE AND ACETAMINOPHEN 5; 325 MG/1; MG/1
1 TABLET ORAL EVERY 6 HOURS PRN
Qty: 20 TABLET | Refills: 0 | Status: SHIPPED | OUTPATIENT
Start: 2023-10-18

## 2023-10-18 NOTE — TELEPHONE ENCOUNTER
Called patients  and read providers message. Good verb given. He clarified that it was Ibuprofen that she was taking not aleve.   Patient had canceled appointment today to be seen by Dr. Melton. I asked if they wanted to re-schedule at this time and he stated that he would talk to patient and call back.   FORTINO.

## 2023-10-18 NOTE — TELEPHONE ENCOUNTER
Continue Aleve and the muscle relaxant.  I sent a short-term prescription for hydrocodone to the pharmacy for breakthrough pain.

## 2023-11-10 ENCOUNTER — HOSPITAL ENCOUNTER (OUTPATIENT)
Dept: MRI IMAGING | Facility: HOSPITAL | Age: 63
Discharge: HOME | End: 2023-11-10
Admitting: STUDENT IN AN ORGANIZED HEALTH CARE EDUCATION/TRAINING PROGRAM
Payer: COMMERCIAL

## 2023-11-10 DIAGNOSIS — M47.9 SPONDYLOSIS: ICD-10-CM

## 2023-11-10 PROCEDURE — 72148 MRI LUMBAR SPINE W/O DYE: CPT

## 2023-11-19 DIAGNOSIS — E78.49 OTHER HYPERLIPIDEMIA: ICD-10-CM

## 2023-11-20 RX ORDER — ATORVASTATIN CALCIUM 10 MG/1
10 TABLET, FILM COATED ORAL DAILY
Qty: 90 TABLET | Refills: 0 | Status: SHIPPED | OUTPATIENT
Start: 2023-11-20

## 2023-11-29 ENCOUNTER — OFFICE VISIT (OUTPATIENT)
Age: 63
End: 2023-11-29
Payer: COMMERCIAL

## 2023-11-29 VITALS
DIASTOLIC BLOOD PRESSURE: 94 MMHG | SYSTOLIC BLOOD PRESSURE: 140 MMHG | HEIGHT: 64 IN | WEIGHT: 172.4 LBS | BODY MASS INDEX: 29.43 KG/M2

## 2023-11-29 DIAGNOSIS — M76.61 ACHILLES TENDINITIS OF RIGHT LOWER EXTREMITY: Primary | ICD-10-CM

## 2023-11-29 DIAGNOSIS — M47.9 SPONDYLOSIS: ICD-10-CM

## 2023-11-29 NOTE — PROGRESS NOTES
[facility] [place] [facility] [place]     Office Follow Up      Date: 11/29/2023   Patient Name: Roz Randolph  MRN: [id number]  YOB: 1960    Referring Physician: No ref. provider found     Chief Complaint:   Chief Complaint   Patient presents with    Follow-up     8 week follow up -- MRI done 11/10/23; Spondylosis; Achilles tendinitis of right lower extremity       History of Present Illness: Roz Randolph is a 63 y.o. female who is here today for follow up on right ankle pain from Achilles tendinitis.  She has been most tender in her mid substance.  Since last visit, she has been in physical therapy and taking Advil.  She has been trying to rest is much as possible.  Pain is much improved and down to 1/10.  She does have pain walking.  Did undergo MRI of the lumbar spine since last visit.    Subjective   Review of Systems: Review of Systems   Constitutional:  Negative for chills, fever, unexpected weight gain and unexpected weight loss.   HENT:  Negative for congestion, postnasal drip and rhinorrhea.    Eyes:  Negative for blurred vision.   Respiratory:  Negative for shortness of breath.    Cardiovascular:  Negative for leg swelling.   Gastrointestinal:  Negative for abdominal pain, nausea and vomiting.   Genitourinary:  Negative for difficulty urinating.   Musculoskeletal:  Positive for arthralgias. Negative for gait problem, joint swelling and myalgias.   Skin:  Negative for skin lesions and wound.   Neurological:  Negative for dizziness, weakness, light-headedness and numbness.   Hematological:  Does not bruise/bleed easily.   Psychiatric/Behavioral:  Negative for depressed mood.         Medications:   Current Outpatient Medications:     atorvastatin (LIPITOR) 10 MG tablet, TAKE 1 TABLET BY MOUTH DAILY, Disp: 90 tablet, Rfl: 0    benzonatate (Tessalon Perles) 100 MG capsule, Take 1 capsule by mouth 3 (Three) Times a Day As Needed for  "Cough., Disp: 30 capsule, Rfl: 0    buPROPion XL (WELLBUTRIN XL) 150 MG 24 hr tablet, TAKE ONE TABLET BY MOUTH EVERY MORNING, Disp: 90 tablet, Rfl: 1    Cholecalciferol (VITAMIN D3) 25 MCG (1000 UT) capsule, Take  by mouth., Disp: , Rfl:     HYDROcodone-acetaminophen (Norco) 5-325 MG per tablet, Take 1 tablet by mouth Every 6 (Six) Hours As Needed for Moderate Pain or Severe Pain., Disp: 20 tablet, Rfl: 0    levocetirizine (XYZAL) 5 MG tablet, TAKE ONE TABLET BY MOUTH DAILY AS NEEDED FOR ALLERGIES, Disp: 90 tablet, Rfl: 0    levothyroxine (SYNTHROID, LEVOTHROID) 100 MCG tablet, TAKE ONE TABLET BY MOUTH DAILY, Disp: 90 tablet, Rfl: 1    losartan-hydrochlorothiazide (HYZAAR) 100-25 MG per tablet, TAKE ONE TABLET BY MOUTH DAILY, Disp: 90 tablet, Rfl: 1    methocarbamol (ROBAXIN) 500 MG tablet, Take 1 tablet by mouth 3 (Three) Times a Day., Disp: 21 tablet, Rfl: 0    predniSONE (DELTASONE) 20 MG tablet, Take 1 tablet by mouth 2 (Two) Times a Day., Disp: 10 tablet, Rfl: 0    Allergies:   Allergies   Allergen Reactions    Augmentin [Amoxicillin-Pot Clavulanate] Rash    Morphine And Related Rash    Naproxen Rash and GI Intolerance     Causes stomach cramps    Tetracyclines & Related Rash, GI Intolerance and Swelling       I have reviewed and updated the patient's chief complaint, history of present illness, review of systems, past medical history, surgical history, family history, social history, medications and allergy list as appropriate.     Objective    Vital Signs:   Vitals:    11/29/23 1523   BP: 140/94   Weight: 78.2 kg (172 lb 6.4 oz)   Height: 162.6 cm (64.02\")     Body mass index is 29.58 kg/m².  BMI is >= 30 and <35. (Class 1 Obesity). The following options were offered after discussion;: exercise counseling/recommendations and nutrition counseling/recommendations     Patient reports that she is a non-smoker and has not ever been a smoker.  This behavior was applauded and she was encouraged to continue in " smoking cessation.  We will continue to monitor at subsequent visits.     Ortho Exam:  Right ankle: No erythema ecchymosis or swelling.  No tenderness today through the mid substance of the Achilles tendon.  She is less tender at the insertion onto the calcaneus.  Full range of motion of the ankle and foot.  Negative Borrego test.  Sensation intact to light touch.  2+ posterior tibial pulse.    Results Review:   Imaging Results (Last 24 Hours)       ** No results found for the last 24 hours. **        MRI LUMBAR SPINE WO CONTRAST     Date of Exam: 11/10/2023 4:01 PM EST     Indication: Lumbar radiculopathy, symptoms persist with > 6 wks treatment.     Comparison: None available.     Technique:  Routine multiplanar/multisequence sequence images of the lumbar spine were obtained without contrast administration.       Findings:  Five lumbar type vertebral bodies are identified.  Alignment is anatomic.  There is no evidence of fracture or compression deformity. Distal spinal cord and conus medullaris appear unremarkable terminating at the L1 level.  Cauda equina appears   unremarkable.  Bone marrow signal is within normal limits.  Paraspinal musculature is preserved. There is a vertebral body hemangioma at L3.     L1-L2: No focal disc protrusion or extrusion. Facet joints appear unremarkable. No significant neural foraminal or spinal canal stenosis.     L2-L3: Disc degeneration with bulge resulting in mild central canal stenosis. Mild right foraminal stenosis. The left foramen is patent     L3-L4: Disc degeneration with mild decreased disc height and bulge. There is mild central canal stenosis. The bilateral foramina are patent. Mild bilateral facet hypertrophy     L4-L5: Disc degeneration with mild bulge. The central canal and bilateral foramina are patent. There is bilateral facet hypertrophy     L5-S1: No focal disc protrusion or extrusion. Facet joints appear unremarkable. No significant neural foraminal or spinal  canal stenosis.     IMPRESSION:  Impression:  Disc degeneration with bulges and spondylosis at L2-L3, L3-L4 and L4-5    Procedures    Assessment / Plan    Assessment/Plan:   Diagnoses and all orders for this visit:    1. Achilles tendinitis of right lower extremity (Primary)    2. Spondylosis    Follow-up on right Achilles tendinitis.  Interval improvement in symptoms.  She is nontender today in the mid substance on exam.  I discussed with her that this symptoms can flareup again.  I provided her with a home exercise protocol for Achilles tendinitis.  I reviewed the recent MRI of the lumbar spine that showed degenerative disc bulges at L2-L3 L3-L4 and L4-L5.  She has an appointment with Dr. Lares from interventional pain management to discuss further next month.  I also gave her home exercise program for this as well to be worked on during the meantime.  If symptoms flare, happy see her back follow-up will be as needed.    Follow Up:   Return if symptoms worsen or fail to improve.      Jose Verdugo MD  [facility] [place] [facility]

## 2023-12-17 DIAGNOSIS — I10 ESSENTIAL HYPERTENSION: ICD-10-CM

## 2023-12-17 DIAGNOSIS — F41.1 GENERALIZED ANXIETY DISORDER: ICD-10-CM

## 2023-12-18 RX ORDER — LOSARTAN POTASSIUM AND HYDROCHLOROTHIAZIDE 25; 100 MG/1; MG/1
1 TABLET ORAL DAILY
Qty: 90 TABLET | Refills: 1 | Status: SHIPPED | OUTPATIENT
Start: 2023-12-18

## 2023-12-18 RX ORDER — BUPROPION HYDROCHLORIDE 150 MG/1
TABLET ORAL
Qty: 90 TABLET | Refills: 1 | Status: SHIPPED | OUTPATIENT
Start: 2023-12-18

## 2023-12-25 DIAGNOSIS — E03.9 ACQUIRED HYPOTHYROIDISM: ICD-10-CM

## 2023-12-26 RX ORDER — LEVOTHYROXINE SODIUM 0.1 MG/1
100 TABLET ORAL DAILY
Qty: 90 TABLET | Refills: 1 | Status: SHIPPED | OUTPATIENT
Start: 2023-12-26

## 2024-02-08 DIAGNOSIS — E78.49 OTHER HYPERLIPIDEMIA: ICD-10-CM

## 2024-02-08 RX ORDER — ATORVASTATIN CALCIUM 10 MG/1
10 TABLET, FILM COATED ORAL DAILY
Qty: 90 TABLET | Refills: 0 | Status: SHIPPED | OUTPATIENT
Start: 2024-02-08

## 2024-02-12 DIAGNOSIS — J30.2 SEASONAL ALLERGIC RHINITIS, UNSPECIFIED TRIGGER: ICD-10-CM

## 2024-02-12 RX ORDER — LEVOCETIRIZINE DIHYDROCHLORIDE 5 MG/1
5 TABLET, FILM COATED ORAL DAILY PRN
Qty: 90 TABLET | Refills: 0 | Status: SHIPPED | OUTPATIENT
Start: 2024-02-12

## 2024-04-25 ENCOUNTER — OFFICE VISIT (OUTPATIENT)
Dept: INTERNAL MEDICINE | Facility: CLINIC | Age: 64
End: 2024-04-25
Payer: COMMERCIAL

## 2024-04-25 VITALS
WEIGHT: 176.13 LBS | SYSTOLIC BLOOD PRESSURE: 158 MMHG | TEMPERATURE: 97.8 F | HEART RATE: 72 BPM | HEIGHT: 63 IN | RESPIRATION RATE: 16 BRPM | DIASTOLIC BLOOD PRESSURE: 96 MMHG | BODY MASS INDEX: 31.21 KG/M2

## 2024-04-25 DIAGNOSIS — K21.9 GASTROESOPHAGEAL REFLUX DISEASE, UNSPECIFIED WHETHER ESOPHAGITIS PRESENT: ICD-10-CM

## 2024-04-25 DIAGNOSIS — J30.2 SEASONAL ALLERGIC RHINITIS, UNSPECIFIED TRIGGER: ICD-10-CM

## 2024-04-25 DIAGNOSIS — E78.5 DYSLIPIDEMIA: ICD-10-CM

## 2024-04-25 DIAGNOSIS — F41.1 GENERALIZED ANXIETY DISORDER: ICD-10-CM

## 2024-04-25 DIAGNOSIS — I10 PRIMARY HYPERTENSION: ICD-10-CM

## 2024-04-25 DIAGNOSIS — Z78.0 MENOPAUSE: ICD-10-CM

## 2024-04-25 DIAGNOSIS — E03.9 ACQUIRED HYPOTHYROIDISM: ICD-10-CM

## 2024-04-25 DIAGNOSIS — Z00.00 ENCOUNTER FOR HEALTH MAINTENANCE EXAMINATION IN ADULT: Primary | ICD-10-CM

## 2024-04-25 DIAGNOSIS — R53.83 FATIGUE, UNSPECIFIED TYPE: ICD-10-CM

## 2024-04-25 DIAGNOSIS — E55.9 VITAMIN D DEFICIENCY: ICD-10-CM

## 2024-04-25 PROBLEM — K57.32 SIGMOID DIVERTICULITIS: Status: RESOLVED | Noted: 2018-03-03 | Resolved: 2024-04-25

## 2024-04-25 LAB
25(OH)D3 SERPL-MCNC: 38.4 NG/ML (ref 30–100)
ALBUMIN SERPL-MCNC: 4.7 G/DL (ref 3.5–5.2)
ALBUMIN/GLOB SERPL: 1.8 G/DL
ALP SERPL-CCNC: 54 U/L (ref 39–117)
ALT SERPL W P-5'-P-CCNC: 66 U/L (ref 1–33)
ANION GAP SERPL CALCULATED.3IONS-SCNC: 12 MMOL/L (ref 5–15)
AST SERPL-CCNC: 67 U/L (ref 1–32)
BASOPHILS # BLD AUTO: 0.05 10*3/MM3 (ref 0–0.2)
BASOPHILS NFR BLD AUTO: 0.7 % (ref 0–1.5)
BILIRUB SERPL-MCNC: 0.6 MG/DL (ref 0–1.2)
BUN SERPL-MCNC: 16 MG/DL (ref 8–23)
BUN/CREAT SERPL: 23.2 (ref 7–25)
CALCIUM SPEC-SCNC: 9.4 MG/DL (ref 8.6–10.5)
CHLORIDE SERPL-SCNC: 102 MMOL/L (ref 98–107)
CHOLEST SERPL-MCNC: 177 MG/DL (ref 0–200)
CO2 SERPL-SCNC: 24 MMOL/L (ref 22–29)
CREAT SERPL-MCNC: 0.69 MG/DL (ref 0.57–1)
DEPRECATED RDW RBC AUTO: 40.2 FL (ref 37–54)
EGFRCR SERPLBLD CKD-EPI 2021: 98.9 ML/MIN/1.73
EOSINOPHIL # BLD AUTO: 0.37 10*3/MM3 (ref 0–0.4)
EOSINOPHIL NFR BLD AUTO: 5.4 % (ref 0.3–6.2)
ERYTHROCYTE [DISTWIDTH] IN BLOOD BY AUTOMATED COUNT: 11.7 % (ref 12.3–15.4)
FOLATE SERPL-MCNC: >20 NG/ML (ref 4.78–24.2)
GLOBULIN UR ELPH-MCNC: 2.6 GM/DL
GLUCOSE SERPL-MCNC: 96 MG/DL (ref 65–99)
HCT VFR BLD AUTO: 40.5 % (ref 34–46.6)
HDLC SERPL-MCNC: 50 MG/DL (ref 40–60)
HGB BLD-MCNC: 14 G/DL (ref 12–15.9)
IMM GRANULOCYTES # BLD AUTO: 0.06 10*3/MM3 (ref 0–0.05)
IMM GRANULOCYTES NFR BLD AUTO: 0.9 % (ref 0–0.5)
LDLC SERPL CALC-MCNC: 104 MG/DL (ref 0–100)
LDLC/HDLC SERPL: 2.03 {RATIO}
LYMPHOCYTES # BLD AUTO: 1.84 10*3/MM3 (ref 0.7–3.1)
LYMPHOCYTES NFR BLD AUTO: 27.1 % (ref 19.6–45.3)
MCH RBC QN AUTO: 32.7 PG (ref 26.6–33)
MCHC RBC AUTO-ENTMCNC: 34.6 G/DL (ref 31.5–35.7)
MCV RBC AUTO: 94.6 FL (ref 79–97)
MONOCYTES # BLD AUTO: 0.56 10*3/MM3 (ref 0.1–0.9)
MONOCYTES NFR BLD AUTO: 8.2 % (ref 5–12)
NEUTROPHILS NFR BLD AUTO: 3.91 10*3/MM3 (ref 1.7–7)
NEUTROPHILS NFR BLD AUTO: 57.7 % (ref 42.7–76)
NRBC BLD AUTO-RTO: 0 /100 WBC (ref 0–0.2)
PLATELET # BLD AUTO: 247 10*3/MM3 (ref 140–450)
PMV BLD AUTO: 10.5 FL (ref 6–12)
POTASSIUM SERPL-SCNC: 3.8 MMOL/L (ref 3.5–5.2)
PROT SERPL-MCNC: 7.3 G/DL (ref 6–8.5)
RBC # BLD AUTO: 4.28 10*6/MM3 (ref 3.77–5.28)
SODIUM SERPL-SCNC: 138 MMOL/L (ref 136–145)
TRIGL SERPL-MCNC: 127 MG/DL (ref 0–150)
TSH SERPL DL<=0.05 MIU/L-ACNC: 1.27 UIU/ML (ref 0.27–4.2)
VIT B12 BLD-MCNC: 456 PG/ML (ref 211–946)
VLDLC SERPL-MCNC: 23 MG/DL (ref 5–40)
WBC NRBC COR # BLD AUTO: 6.79 10*3/MM3 (ref 3.4–10.8)

## 2024-04-25 PROCEDURE — 82306 VITAMIN D 25 HYDROXY: CPT | Performed by: INTERNAL MEDICINE

## 2024-04-25 PROCEDURE — 80061 LIPID PANEL: CPT | Performed by: INTERNAL MEDICINE

## 2024-04-25 PROCEDURE — 99396 PREV VISIT EST AGE 40-64: CPT | Performed by: INTERNAL MEDICINE

## 2024-04-25 PROCEDURE — 82746 ASSAY OF FOLIC ACID SERUM: CPT | Performed by: INTERNAL MEDICINE

## 2024-04-25 PROCEDURE — 80050 GENERAL HEALTH PANEL: CPT | Performed by: INTERNAL MEDICINE

## 2024-04-25 PROCEDURE — 36415 COLL VENOUS BLD VENIPUNCTURE: CPT | Performed by: INTERNAL MEDICINE

## 2024-04-25 PROCEDURE — 82607 VITAMIN B-12: CPT | Performed by: INTERNAL MEDICINE

## 2024-04-25 RX ORDER — FLUTICASONE PROPIONATE 50 MCG
2 SPRAY, SUSPENSION (ML) NASAL DAILY PRN
Qty: 16 G | Refills: 5 | Status: SHIPPED | OUTPATIENT
Start: 2024-04-25

## 2024-04-25 NOTE — PROGRESS NOTES
Subjective     Chief Complaint:  Physical Exam.    History of Present Illness    History obtained from the patient.    Of note, the patient a chest CT on 2/1/2018 which showed an on calcified 4 mm Right Lower Lobe Lung Nodule, stable x 2 years, no further imaging recommended.    The patient is on Xyzal for Seasonal Allergic Rhinitis.  She complains of congestion, clear rhinorrhea, postnasal drainage, sinus pressure, and itchy watery eyes.  She also has an intermittent cough.  She is requesting a prescription for Flonase.     Cardiac Follow-up: The patient is here today for a 6 month follow-up visit.  .  Her Hypertension has been stable.   Medication: Losartan / HCTZ.   Her Hyperlipidemia has been stable.   Her LDL goal is < 130,  last LDL was 111, .   Medication: Atorvastatin  Side Effects: None.     Procedures: On 3/28/2022, Coronary Artery Calcium Score was 2.      Interval Events:  The patient states her blood pressure at home has been 120's / 80's.  They have had the blood pressure cuff checked and it has been accurate.      Symptoms:  Denies chest pain, dyspnea, PORTILLO, orthopnea, PND, palpitations, syncope, lower extremity edema, claudication, lightheadedness, dizziness.  Associated Symptoms: Weight up 3 pounds since 9/1/2023.  Reports fatigue (not sleeping well).  No headache, polydipsia, polyuria, myalgias, arthralgias, visual impairment, memory loss, or concentration problems.     Lifestyle: She consumes a half healthy diet, overall.  She walks 3 times per week and is active at work.  Tobacco Use: Never a Smoker.      Hypothyroidism Follow-Up: The patient is being seen for follow-up of Hypothyroidism, which is stable.   Interval Events: On 9/1/2023, TSH and TSH were normal.  Symptoms:  Weight up 3 pounds since 9/1/2023.  Reports fatigue (not sleeping well).  She denies heat/cold intolerance, constipation, diarrhea, memory loss, trouble concentrating, hair loss, and dry skin.   Associated  Symptoms: no myalgias, arthralgias, or paresthesias.   Medications:  Levothyroxine (Synthroid).       GERD Follow-up: The patient is being seen for a routine clinic follow-up of Gastroesophageal Reflux Disease, which is stable.  Procedures:  EGD 12/21/15 (small hiatal hernia, mild duodenitis, and concentric espohageal rings suggestive of esophsgitis- path c/w mild chronic esophagitis w/ increased eosinophils suggestive of reflux ([place]).   Comorbid Illness: On 6/16/2020, liver enzymes were elevated ( and ALT 87). Elevated liver panel done on 11/19/2020 was significant for an elevated ARSH, GGT (249), Ferritin (778), ALT (80),  and AST (98 ).  Liver ultrasound was ordered on 1/25/2021, but not done.  She saw Dr. Matsers on 1/28/2021.  He felt her ARSH was a false positive.  Additional labs were done on 8/25/2021 and were negative with the exception of an elevated AST (57) and ALT (68).  Last labs 9/1/2023 were significant for elevated AST (47) and ALT (60)  Interval Events:  None.  Symptoms:   No abdominal pain, heartburn, acid regurgitation, nausea, vomiting, dysphagia, odynophagia, hematemesis,  melena, early satiety, belching, and bloating.  Associated Symptoms:  No chronic cough, sore throat, hoarseness, or wheezing.   Medication: Tums prn.      Vitamin D Deficiency Follow-Up: The patient is being seen for follow-up of Vitamin D Deficiency, which is stable.    Interval Events: Vitamin D level on 12/12/2022 was 40.7.   Symptoms: Reports fatigue (not sleeping well).  No myalgias, arthralgias, paresthesias, balance issues, or gait abnormality.  Medication: Vitamin D3 (cholecalciferol) 2000 IU daily.  She has been out for the past 1 week.     Anxiety Disorder Follow-Up: The patient is being seen for follow-up of Anxiety, which is stable.  Comorbid Illnesses None.   Interval Events:  None.   Symptoms: Stable anxiety.  She has had sleep disruption due to taking care of her .  Denies depression, panic  attacks, anhedonia, memory loss, and difficulty concentrating.  Associated Symptoms: no suicidal ideation or thoughts of self-harm.   Medication: Wellbutrin XL.   Side Effects: None.      Roz Randolph is a 63 y.o. female who presents for an Annual Physical.      PMH, PSH, SocHx, FamHx, Allergies, and Medications: Reviewed and updated.    Outpatient Medications Prior to Visit   Medication Sig Dispense Refill    atorvastatin (LIPITOR) 10 MG tablet TAKE 1 TABLET BY MOUTH DAILY 90 tablet 0    buPROPion XL (WELLBUTRIN XL) 150 MG 24 hr tablet TAKE ONE TABLET BY MOUTH EVERY MORNING 90 tablet 1    Cholecalciferol (VITAMIN D3) 25 MCG (1000 UT) capsule Take  by mouth.      levocetirizine (XYZAL) 5 MG tablet Take 1 tablet by mouth Daily As Needed for Allergies. 90 tablet 0    levothyroxine (SYNTHROID, LEVOTHROID) 100 MCG tablet TAKE 1 TABLET BY MOUTH DAILY 90 tablet 1    losartan-hydrochlorothiazide (HYZAAR) 100-25 MG per tablet TAKE 1 TABLET BY MOUTH DAILY 90 tablet 1    benzonatate (Tessalon Perles) 100 MG capsule Take 1 capsule by mouth 3 (Three) Times a Day As Needed for Cough. (Patient not taking: Reported on 4/25/2024) 30 capsule 0    HYDROcodone-acetaminophen (Norco) 5-325 MG per tablet Take 1 tablet by mouth Every 6 (Six) Hours As Needed for Moderate Pain or Severe Pain. (Patient not taking: Reported on 4/25/2024) 20 tablet 0    methocarbamol (ROBAXIN) 500 MG tablet Take 1 tablet by mouth 3 (Three) Times a Day. (Patient not taking: Reported on 4/25/2024) 21 tablet 0    predniSONE (DELTASONE) 20 MG tablet Take 1 tablet by mouth 2 (Two) Times a Day. (Patient not taking: Reported on 4/25/2024) 10 tablet 0     No facility-administered medications prior to visit.       Immunization History   Administered Date(s) Administered    COVID-19 (PFIZER) BIVALENT 12+YRS 12/12/2022    COVID-19 (PFIZER) Purple Cap Monovalent 02/24/2021, 03/17/2021, 10/27/2021    Covid-19 (Pfizer) Gray Cap Monovalent 04/22/2022    Flu Vaccine  Quad PF >36MO 01/03/2018    Fluzone (or Fluarix & Flulaval for VFC) >6mos 10/18/2019, 11/19/2020, 12/12/2022    Hepatitis A 04/15/2019, 10/18/2019    Influenza, Unspecified 12/12/2022    PPD Test 02/08/2019    Tdap 03/23/2011, 08/25/2021         Patient Active Problem List   Diagnosis    Anxiety disorder    Diverticulosis of large intestine    Abnormal liver enzymes    Gastroesophageal reflux disease    Hyperlipidemia    Hypertension    Hypothyroidism    Lung nodule    Macrocytosis    Vitamin D deficiency    Rash       Health Habits:  Dental Exam. up to date  Eye Exam. up to date  Hearing Loss:  No  Exercise: 3 times/week.  Current exercise activities include: walking  Diet: 1/2 Healthy  Multivitamin: Yes    Safe Driving:  Yes  Seat Belt:  Yes  Bike Helmet:  N/A  Skin Screening:  Yes  Sunscreen: Yes  SBE / MATIAS: Yes  Sexual Activity:  Yes  Birth Control:  MO  STD Prevention:  N/A    Last Pap: n/A (MO/BSO)  Last Mammogram: 12/7/2022, category 1  Last DEXA Scan: 11/27/2018, normal.  Last Colonoscopy: 12/4/2023, mild pancolonic diverticulosis and small internal hemorrhoids, no polyps  Last PSA: N/A    Social:    Social History     Socioeconomic History    Marital status:     Number of children: 2   Tobacco Use    Smoking status: Never     Passive exposure: Past (childhood)    Smokeless tobacco: Never   Vaping Use    Vaping status: Never Used    Passive vaping exposure: Yes (occasional- son)   Substance and Sexual Activity    Alcohol use: Yes     Alcohol/week: 3.0 standard drinks of alcohol     Types: 3 Glasses of wine per week     Comment: 2-3 glasses of wine weekends    Drug use: No    Sexual activity: Yes     Partners: Male     Birth control/protection: Surgical         Current Medical Providers:    Maia Melton MD (Internal Medicine / Pediatrics)    [facility] providers who are involved in the care of this patient are listed above.         Review of Systems   Constitutional:  Positive for fatigue.  Negative for appetite change, chills, fever and unexpected weight change.        No night sweats.    HENT:  Positive for congestion (with allergies), postnasal drip (with allergies), rhinorrhea (with allergies) and sinus pressure (with allergies). Negative for ear pain, hearing loss, nosebleeds, sinus pain, sneezing, sore throat, tinnitus and voice change.         Denies snoring.   Eyes:  Positive for pain (burning with allergies), discharge (watery) and itching. Negative for photophobia, redness and visual disturbance.   Respiratory:  Positive for cough (intermittent, better with Xyzal). Negative for chest tightness, shortness of breath, wheezing and stridor.         No chest congestion.  No hemoptysis.   Cardiovascular:  Negative for chest pain, palpitations and leg swelling.        No orthopnea, PORTILLO, or PND.  No claudication or syncope.   Gastrointestinal:  Negative for abdominal pain, blood in stool, constipation, diarrhea, nausea, rectal pain and vomiting.        No melena.  No hematemesis.  No heartburn, dysphagia or odynophagia.  No early satiety, belching, or bloating.    Endocrine: Negative for cold intolerance, heat intolerance, polydipsia, polyphagia and polyuria.        No hair loss or dry skin.  No hot flashes.     Genitourinary:  Negative for difficulty urinating, dyspareunia, dysuria, flank pain, frequency, hematuria, pelvic pain, urgency, vaginal bleeding and vaginal discharge.        No nocturia, incomplete emptying, or incontinence.   Musculoskeletal:  Positive for back pain (chronic stable), neck pain (chronic) and neck stiffness (chronic). Negative for arthralgias, gait problem, joint swelling and myalgias.        No joint stiffness.   Skin:  Negative for rash.        No new skin lesions or changes in skin lesions. No breast pain or masses.  No nipple discharge or nipple inversion.   Allergic/Immunologic: Positive for environmental allergies.   Neurological:  Negative for dizziness, tremors,  "syncope, speech difficulty, weakness, light-headedness, numbness and headaches.        No tingling.  No memory loss.  No decreased concentration.   Hematological:  Negative for adenopathy. Does not bruise/bleed easily.   Psychiatric/Behavioral:  Negative for confusion, self-injury, sleep disturbance and suicidal ideas. The patient is nervous/anxious.         No depression.           Objective     Vitals:    04/25/24 0904   BP: 158/96   BP Location: Right arm   Patient Position: Sitting   Cuff Size: Adult   Pulse: 72   Resp: 16   Temp: 97.8 °F (36.6 °C)   TempSrc: Infrared   Weight: 79.9 kg (176 lb 2 oz)   Height: 161 cm (63.39\")   PainSc: 0-No pain       Body mass index is 30.82 kg/m².    Physical Exam  Vitals and nursing note reviewed.   Constitutional:       Appearance: Normal appearance. She is well-developed.      Comments: BMI greater than 30   HENT:      Head: Normocephalic and atraumatic.      Right Ear: Tympanic membrane, ear canal and external ear normal.      Left Ear: Tympanic membrane, ear canal and external ear normal.      Mouth/Throat:      Mouth: Mucous membranes are moist. No oral lesions.      Pharynx: Oropharynx is clear.      Comments: Tonsils normal.  Eyes:      Extraocular Movements: Extraocular movements intact.      Conjunctiva/sclera: Conjunctivae normal.      Pupils: Pupils are equal, round, and reactive to light.      Comments: Fundi normal bilaterally.   Neck:      Thyroid: No thyromegaly.      Vascular: No carotid bruit.   Cardiovascular:      Rate and Rhythm: Normal rate and regular rhythm.      Pulses: Normal pulses.      Heart sounds: Normal heart sounds. No murmur heard.     No friction rub. No gallop.   Pulmonary:      Effort: Pulmonary effort is normal.      Breath sounds: Normal breath sounds.   Chest:   Breasts:     Right: No inverted nipple, mass, nipple discharge, skin change or tenderness.      Left: No inverted nipple, mass, nipple discharge, skin change or tenderness. "   Abdominal:      General: Bowel sounds are normal. There is no distension or abdominal bruit.      Palpations: Abdomen is soft. There is no hepatomegaly, splenomegaly or mass.      Tenderness: There is no abdominal tenderness.   Genitourinary:     Comments:  and rectal exam deferred.  Musculoskeletal:         General: Normal range of motion.      Cervical back: Normal range of motion and neck supple.      Right lower leg: No edema.      Left lower leg: No edema.   Lymphadenopathy:      Cervical: No cervical adenopathy.      Upper Body:      Right upper body: No supraclavicular or axillary adenopathy.      Left upper body: No supraclavicular or axillary adenopathy.   Skin:     Findings: No rash.      Comments: No atypical skin lesions.   Neurological:      Mental Status: She is alert.      Cranial Nerves: No cranial nerve deficit.      Motor: Motor function is intact. No abnormal muscle tone.      Coordination: Coordination is intact.      Gait: Gait is intact.      Deep Tendon Reflexes: Reflexes are normal and symmetric.   Psychiatric:         Mood and Affect: Mood normal.         PHQ-2 Depression Screening  Little interest or pleasure in doing things? 0-->not at all   Feeling down, depressed, or hopeless? 0-->not at all   PHQ-2 Total Score 0         Counseling was given to patient for the following topics: appropriate exercise, healthy eating habits, disease prevention, risk factors for cancer, importance of self breast exam and breast health, importance of immunizations, including risks and benefits, sun safety, seatbelt use, and safe driving. Also discussed the importance of regular dental and vision care, as well recommendation for a yearly screening skin exam after age 40.  Written information provided to patient on these topics and other health maintenance issues.        Diagnoses and all orders for this visit:    1. Encounter for health maintenance examination in adult (Primary)  -     Folate  -      Vitamin B12  -     Lipid Panel  -     Comprehensive Metabolic Panel  -     TSH  -     CBC & Differential    2. Primary hypertension  -     Lipid Panel  -     Comprehensive Metabolic Panel  -     TSH  -     CBC & Differential   Continue current medication(s) as noted in the history of present illness.    3. Dyslipidemia  -     Lipid Panel  -     Comprehensive Metabolic Panel  -     TSH  -     CBC & Differential   Continue current medication(s) as noted in the history of present illness.    4. Acquired hypothyroidism  -     TSH   Continue current medication(s) as noted in the history of present illness.    5. Gastroesophageal reflux disease, unspecified whether esophagitis present   Continue current medication(s) as noted in the history of present illness.    6. Vitamin D deficiency  -     Vitamin D,25-Hydroxy   Continue Vitamin D supplementation.    7. Generalized anxiety disorder   Continue current medication(s) as noted in the history of present illness.    8. Seasonal allergic rhinitis, unspecified trigger  -     fluticasone (FLONASE) 50 MCG/ACT nasal spray; 2 sprays into the nostril(s) as directed by provider Daily As Needed for Allergies.  Dispense: 16 g; Refill: 5- NEW   Continue current medication(s) as noted in the history of present illness.    9. Menopause  -     DEXA Bone Density Axial; Future    10. Fatigue, unspecified type  -     Folate  -     Vitamin B12      The patient agrees to schedule her Mammogram.     I recommended Shingrix (Shingles vaccine), COVID-19 bivalent vaccine, and RSV vaccine (at the start of next season) at the pharmacy.        Return in about 6 months (around 10/25/2024) for Recheck HTN, fasting.

## 2024-04-25 NOTE — PATIENT INSTRUCTIONS
I recommend Shingrix (Shingles vaccine), COVID-19 bivalent vaccine, and RSV vaccine (at the start of next season) at the pharmacy, as we discussed.    Please schedule your Mammogram, as we discussed.    Health Maintenance for Postmenopausal Women  Menopause is a normal process in which your ability to get pregnant comes to an end. This process happens slowly over many months or years, usually between the ages of 48 and 55. Menopause is complete when you have missed your menstrual period for 12 months.  It is important to talk with your health care provider about some of the most common conditions that affect women after menopause (postmenopausal women). These include heart disease, cancer, and bone loss (osteoporosis). Adopting a healthy lifestyle and getting preventive care can help to promote your health and wellness. The actions you take can also lower your chances of developing some of these common conditions.  What are the signs and symptoms of menopause?  During menopause, you may have the following symptoms:  Hot flashes. These can be moderate or severe.  Night sweats.  Decrease in sex drive.  Mood swings.  Headaches.  Tiredness (fatigue).  Irritability.  Memory problems.  Problems falling asleep or staying asleep.  Talk with your health care provider about treatment options for your symptoms.  Do I need hormone replacement therapy?  Hormone replacement therapy is effective in treating symptoms that are caused by menopause, such as hot flashes and night sweats.  Hormone replacement carries certain risks, especially as you become older. If you are thinking about using estrogen or estrogen with progestin, discuss the benefits and risks with your health care provider.  How can I reduce my risk for heart disease and stroke?  The risk of heart disease, heart attack, and stroke increases as you age. One of the causes may be a change in the body's hormones during menopause. This can affect how your body uses dietary  fats, triglycerides, and cholesterol. Heart attack and stroke are medical emergencies. There are many things that you can do to help prevent heart disease and stroke.  Watch your blood pressure  High blood pressure causes heart disease and increases the risk of stroke. This is more likely to develop in people who have high blood pressure readings or are overweight.  Have your blood pressure checked:  Every 3-5 years if you are 18-39 years of age.  Every year if you are 40 years old or older.  Eat a healthy diet    Eat a diet that includes plenty of vegetables, fruits, low-fat dairy products, and lean protein.  Do not eat a lot of foods that are high in solid fats, added sugars, or sodium.  Get regular exercise  Get regular exercise. This is one of the most important things you can do for your health. Most adults should:  Try to exercise for at least 150 minutes each week. The exercise should increase your heart rate and make you sweat (moderate-intensity exercise).  Try to do strengthening exercises at least twice each week. Do these in addition to the moderate-intensity exercise.  Spend less time sitting. Even light physical activity can be beneficial.  Other tips  Work with your health care provider to achieve or maintain a healthy weight.  Do not use any products that contain nicotine or tobacco. These products include cigarettes, chewing tobacco, and vaping devices, such as e-cigarettes. If you need help quitting, ask your health care provider.  Know your numbers. Ask your health care provider to check your cholesterol and your blood sugar (glucose). Continue to have your blood tested as directed by your health care provider.  Do I need screening for cancer?  Depending on your health history and family history, you may need to have cancer screenings at different stages of your life. This may include screening for:  Breast cancer.  Cervical cancer.  Lung cancer.  Colorectal cancer.  What is my risk for  osteoporosis?  After menopause, you may be at increased risk for osteoporosis. Osteoporosis is a condition in which bone destruction happens more quickly than new bone creation. To help prevent osteoporosis or the bone fractures that can happen because of osteoporosis, you may take the following actions:  If you are 19-50 years old, get at least 1,000 mg of calcium and at least 600 international units (IU) of vitamin D per day.  If you are older than age 50 but younger than age 70, get at least 1,200 mg of calcium and at least 600 international units (IU) of vitamin D per day.  If you are older than age 70, get at least 1,200 mg of calcium and at least 800 international units (IU) of vitamin D per day.  Smoking and drinking excessive alcohol increase the risk of osteoporosis. Eat foods that are rich in calcium and vitamin D, and do weight-bearing exercises several times each week as directed by your health care provider.  How does menopause affect my mental health?  Depression may occur at any age, but it is more common as you become older. Common symptoms of depression include:  Feeling depressed.  Changes in sleep patterns.  Changes in appetite or eating patterns.  Feeling an overall lack of motivation or enjoyment of activities that you previously enjoyed.  Frequent crying spells.  Talk with your health care provider if you think that you are experiencing any of these symptoms.  General instructions  See your health care provider for regular wellness exams and vaccines. This may include:  Scheduling regular health, dental, and eye exams.  Getting and maintaining your vaccines. These include:  Influenza vaccine. Get this vaccine each year before the flu season begins.  Pneumonia vaccine.  Shingles vaccine.  Tetanus, diphtheria, and pertussis (Tdap) booster vaccine.  Your health care provider may also recommend other immunizations.  Tell your health care provider if you have ever been abused or do not feel safe at  home.  Summary  Menopause is a normal process in which your ability to get pregnant comes to an end.  This condition causes hot flashes, night sweats, decreased interest in sex, mood swings, headaches, or lack of sleep.  Treatment for this condition may include hormone replacement therapy.  Take actions to keep yourself healthy, including exercising regularly, eating a healthy diet, watching your weight, and checking your blood pressure and blood sugar levels.  Get screened for cancer and depression. Make sure that you are up to date with all your vaccines.  This information is not intended to replace advice given to you by your health care provider. Make sure you discuss any questions you have with your health care provider.  Document Revised: 05/09/2022 Document Reviewed: 05/09/2022  [employer] Patient Education © 2024 [employer]      MyPlate from [employer]    MyPlate is an outline of a general healthy diet based on the Dietary Guidelines for Americans, 8940-2778, from the U.[place]. Department of Agriculture (USDA). It sets guidelines for how much food you should eat from each food group based on your age, sex, and level of physical activity.  What are tips for following MyPlate?  To follow MyPlate recommendations:  Eat a wide variety of fruits and vegetables, grains, and protein foods.  Serve smaller portions and eat less food throughout the day.  Limit portion sizes to avoid overeating.  Enjoy your food.  Get at least 150 minutes of exercise every week. This is about 30 minutes each day, 5 or more days per week.  It can be difficult to have every meal look like MyPlate. Think about MyPlate as eating guidelines for an entire day, rather than each individual meal.  Fruits and vegetables  Make one half of your plate fruits and vegetables.  Eat many different colors of fruits and vegetables each day.  For a 2,000-calorie daily food plan, eat:  2½ cups of vegetables every day.  2 cups of fruit every day.  1 cup is equal  to:  1 cup raw or cooked vegetables.  1 cup raw fruit.  1 medium-sized orange, apple, or banana.  1 cup 100% fruit or vegetable juice.  2 cups raw leafy greens, such as lettuce, spinach, or kale.  ½ cup dried fruit.  Grains  One fourth of your plate should be grains.  Make at least half of the grains you eat each day whole grains.  For a 2,000-calorie daily food plan, eat 6 oz of grains every day.  1 oz is equal to:  1 slice bread.  1 cup cereal.  ½ cup cooked rice, cereal, or pasta.  Protein  One fourth of your plate should be protein.  Eat a wide variety of protein foods, including meat, poultry, fish, eggs, beans, nuts, and tofu.  For a 2,000-calorie daily food plan, eat 5½ oz of protein every day.  1 oz is equal to:  1 oz meat, poultry, or fish.  ¼ cup cooked beans.  1 egg.  ½ oz nuts or seeds.  1 Tbsp peanut butter.  Dairy  Drink fat-free or low-fat (1%) milk.  Eat or drink dairy as a side to meals.  For a 2,000-calorie daily food plan, eat or drink 3 cups of dairy every day.  1 cup is equal to:  1 cup milk, yogurt, cottage cheese, or soy milk (soy beverage).  2 oz processed cheese.  1½ oz natural cheese.  Fats, oils, salt, and sugars  Only small amounts of oils are recommended.  Avoid foods that are high in calories and low in nutritional value (empty calories), like foods high in fat or added sugars.  Choose foods that are low in salt (sodium). Choose foods that have less than 140 milligrams (mg) of sodium per serving.  Drink water instead of sugary drinks. Drink enough fluid to keep your urine pale yellow.  Where to find support  Work with your health care provider or a dietitian to develop a customized eating plan that is right for you.  Download an yumiko (mobile application) to help you track your daily food intake.  Where to find more information  USDA: ChooseMyPlate.gov  Summary  MyPlate is a general guideline for healthy eating from the USDA. It is based on the Dietary Guidelines for Americans,  4615-4939.  In general, fruits and vegetables should take up one half of your plate, grains should take up one fourth of your plate, and protein should take up one fourth of your plate.  This information is not intended to replace advice given to you by your health care provider. Make sure you discuss any questions you have with your health care provider.  Document Revised: 11/08/2021 Document Reviewed: 11/08/2021  [employer] Patient Education © 2024 [employer]      Exercising to Stay Healthy  To become healthy and stay healthy, it is recommended that you do moderate-intensity and vigorous-intensity exercise. You can tell that you are exercising at a moderate intensity if your heart starts beating faster and you start breathing faster but can still hold a conversation. You can tell that you are exercising at a vigorous intensity if you are breathing much harder and faster and cannot hold a conversation while exercising.  How can exercise benefit me?  Exercising regularly is important. It has many health benefits, such as:  Improving overall fitness, flexibility, and endurance.  Increasing bone density.  Helping with weight control.  Decreasing body fat.  Increasing muscle strength and endurance.  Reducing stress and tension, anxiety, depression, or anger.  Improving overall health.  What guidelines should I follow while exercising?  Before you start a new exercise program, talk with your health care provider.  Do not exercise so much that you hurt yourself, feel dizzy, or get very short of breath.  Wear comfortable clothes and wear shoes with good support.  Drink plenty of water while you exercise to prevent dehydration or heat stroke.  Work out until your breathing and your heartbeat get faster (moderate intensity).  How often should I exercise?  Choose an activity that you enjoy, and set realistic goals. Your health care provider can help you make an activity plan that is individually designed and works best for  you.  Exercise regularly as told by your health care provider. This may include:  Doing strength training two times a week, such as:  Lifting weights.  Using resistance bands.  Push-ups.  Sit-ups.  Yoga.  Doing a certain intensity of exercise for a given amount of time. Choose from these options:  A total of 150 minutes of moderate-intensity exercise every week.  A total of 75 minutes of vigorous-intensity exercise every week.  A mix of moderate-intensity and vigorous-intensity exercise every week.  Children, pregnant women, people who have not exercised regularly, people who are overweight, and older adults may need to talk with a health care provider about what activities are safe to perform. If you have a medical condition, be sure to talk with your health care provider before you start a new exercise program.  What are some exercise ideas?  Moderate-intensity exercise ideas include:  Walking 1 mile (1.6 km) in about 15 minutes.  Biking.  Hiking.  Golfing.  Dancing.  Water aerobics.  Vigorous-intensity exercise ideas include:  Walking 4.5 miles (7.2 km) or more in about 1 hour.  Jogging or running 5 miles (8 km) in about 1 hour.  Biking 10 miles (16.1 km) or more in about 1 hour.  Lap swimming.  Roller-skating or in-line skating.  Cross-country skiing.  Vigorous competitive sports, such as football, basketball, and soccer.  Jumping rope.  Aerobic dancing.  What are some everyday activities that can help me get exercise?  Yard work, such as:  Pushing a .  Raking and bagging leaves.  Washing your car.  Pushing a stroller.  Shoveling snow.  Gardening.  Washing windows or floors.  How can I be more active in my day-to-day activities?  Use stairs instead of an elevator.  Take a walk during your lunch break.  If you drive, park your car farther away from your work or school.  If you take public transportation, get off one stop early and walk the rest of the way.  Stand up or walk around during all of your  indoor phone calls.  Get up, stretch, and walk around every 30 minutes throughout the day.  Enjoy exercise with a friend. Support to continue exercising will help you keep a regular routine of activity.  Where to find more information  You can find more information about exercising to stay healthy from:  [place] Department of Health and Human Services: www.hhs.gov  Centers for Disease Control and Prevention (CDC): www.cdc.gov  Summary  Exercising regularly is important. It will improve your overall fitness, flexibility, and endurance.  Regular exercise will also improve your overall health. It can help you control your weight, reduce stress, and improve your bone density.  Do not exercise so much that you hurt yourself, feel dizzy, or get very short of breath.  Before you start a new exercise program, talk with your health care provider.  This information is not intended to replace advice given to you by your health care provider. Make sure you discuss any questions you have with your health care provider.  Document Revised: 04/15/2022 Document Reviewed: 04/15/2022  [employer] Patient Education © 2024 [employer]

## 2024-05-06 DIAGNOSIS — E78.49 OTHER HYPERLIPIDEMIA: ICD-10-CM

## 2024-05-06 RX ORDER — ATORVASTATIN CALCIUM 10 MG/1
10 TABLET, FILM COATED ORAL DAILY
Qty: 90 TABLET | Refills: 0 | Status: SHIPPED | OUTPATIENT
Start: 2024-05-06

## 2024-05-22 DIAGNOSIS — J30.2 SEASONAL ALLERGIC RHINITIS, UNSPECIFIED TRIGGER: ICD-10-CM

## 2024-05-23 RX ORDER — LEVOCETIRIZINE DIHYDROCHLORIDE 5 MG/1
5 TABLET, FILM COATED ORAL DAILY PRN
Qty: 90 TABLET | Refills: 0 | Status: SHIPPED | OUTPATIENT
Start: 2024-05-23

## 2024-05-23 RX ORDER — LEVOCETIRIZINE DIHYDROCHLORIDE 5 MG/1
5 TABLET, FILM COATED ORAL DAILY PRN
Qty: 90 TABLET | Refills: 0 | OUTPATIENT
Start: 2024-05-23

## 2024-06-21 DIAGNOSIS — E03.9 ACQUIRED HYPOTHYROIDISM: ICD-10-CM

## 2024-06-21 RX ORDER — LEVOTHYROXINE SODIUM 0.1 MG/1
100 TABLET ORAL DAILY
Qty: 90 TABLET | Refills: 1 | Status: SHIPPED | OUTPATIENT
Start: 2024-06-21

## 2024-06-24 DIAGNOSIS — E03.9 ACQUIRED HYPOTHYROIDISM: ICD-10-CM

## 2024-06-24 RX ORDER — LEVOTHYROXINE SODIUM 0.1 MG/1
100 TABLET ORAL DAILY
Qty: 90 TABLET | Refills: 1 | OUTPATIENT
Start: 2024-06-24

## 2024-07-22 ENCOUNTER — TELEMEDICINE (OUTPATIENT)
Dept: FAMILY MEDICINE CLINIC | Facility: TELEHEALTH | Age: 64
End: 2024-07-22
Payer: COMMERCIAL

## 2024-07-22 DIAGNOSIS — B02.9 HERPES ZOSTER WITHOUT COMPLICATION: Primary | ICD-10-CM

## 2024-07-22 PROCEDURE — 99213 OFFICE O/P EST LOW 20 MIN: CPT | Performed by: NURSE PRACTITIONER

## 2024-07-22 RX ORDER — VALACYCLOVIR HYDROCHLORIDE 1 G/1
1000 TABLET, FILM COATED ORAL 3 TIMES DAILY
Qty: 21 TABLET | Refills: 0 | Status: SHIPPED | OUTPATIENT
Start: 2024-07-22 | End: 2024-07-29

## 2024-07-22 NOTE — PATIENT INSTRUCTIONS
Shingles    Shingles is an infection. It gives you a painful skin rash and blisters that have fluid in them. Shingles is caused by the same germ (virus) that causes chickenpox.  Shingles only happens in people who:  Have had chickenpox.  Have been given a shot (vaccine) to protect against chickenpox. Shingles is rare in this group.  What are the causes?  This condition is caused by varicella-zoster virus. This is the same germ that causes chickenpox. After a person is exposed to the germ, the germ stays in the body but is not active (dormant).  Shingles develops if the germ becomes active again (is reactivated). This can happen many years after the first exposure to the germ. It is not known what causes this germ to become active again.  What increases the risk?  People who have had chickenpox or received the chickenpox shot are at risk for shingles. This infection is more common in people who:  Are older than 60 years of age.  Have a weakened disease-fighting system (immune system), such as people with:  HIV (human immunodeficiency virus).  AIDS (acquired immunodeficiency syndrome).  Cancer.  Are taking medicines that weaken the immune system, such as organ transplant medicines.  Have a lot of stress.  What are the signs or symptoms?  The first symptoms of shingles may be itching, tingling, or pain in an area on your skin.  A rash will show on your skin a few days or weeks later. This is what usually happens:  The rash is likely to be on one side of your body.  The rash usually has a shape like a belt or a band. Over time, the rash turns into fluid-filled blisters.  The blisters will break open and change into scabs.  The scabs usually dry up in about 2-3 weeks.  You may also have:  A fever.  Chills.  A headache.  A feeling like you may vomit (nausea).  How is this treated?  The rash may last for several weeks. There is not a specific cure for this condition.  Your doctor may prescribe medicines. Medicines  may:  Help with pain.  Help you get better sooner.  Help to prevent long-term problems.  Help with itching (antihistamines).  If the area involved is on your face, you may need to see a specialist. This may be an eye doctor or an ear, nose, and throat (ENT) doctor.  Follow these instructions at home:  Medicines  Take over-the-counter and prescription medicines only as told by your doctor.  Put on an anti-itch cream or numbing cream where you have a rash, blisters, or scabs. Do this as told by your doctor.  Helping with itching and discomfort    Put cold, wet cloths (cold compresses) on the area of the rash or blisters as told by your doctor.  Cool baths can help you feel better. Try adding baking soda or dry oatmeal to the water to lessen itching. Do not bathe in hot water.  Use calamine lotion as told by your doctor.  Blister and rash care  Keep your rash covered with a loose bandage (dressing).  Wear loose clothing that does not rub on your rash.  Wash your hands with soap and water for at least 20 seconds before and after you change your bandage. If you cannot use soap and water, use hand .  Change your bandage as told by your doctor.  Keep your rash and blisters clean. To do this, wash the area with mild soap and cool water as told by your doctor.  Check your rash every day for signs of infection. Check for:  More redness, swelling, or pain.  Fluid or blood.  Warmth.  Pus or a bad smell.  Do not scratch your rash. Do not pick at your blisters. To help you to not scratch:  Keep your fingernails clean and cut short.  Wear gloves or mittens when you sleep, if scratching is a problem.  General instructions  Rest as told by your doctor.  Wash your hands often with soap and water for at least 20 seconds. If you cannot use soap and water, use hand . Doing this lowers your chance of getting a skin infection.  Your infection can cause chickenpox in people who have never had chickenpox or never got a  chickenpox vaccine shot. If you have blisters that did not change into scabs yet, try not to touch other people or be around other people, especially:  Babies.  Pregnant women.  Children who have areas of red, itchy, or rough skin (eczema).  Older people who have organ transplants.  People who have a long-term (chronic) illness, like cancer or AIDS.  Keep all follow-up visits.  How is this prevented?  A vaccine shot is the best way to prevent shingles and protect against shingles problems.  If you have not had a vaccine shot, talk with your doctor about getting it.  Where to find more information  Centers for Disease Control and Prevention: www.cdc.gov  Contact a doctor if:  Your pain does not get better with medicine.  Your pain does not get better after the rash heals.  You have any of these signs of infection around the rash:  More redness, swelling, or pain.  Fluid or blood.  Warmth.  Pus or a bad smell.  You have a fever.  Get help right away if:  The rash is on your face or nose.  You have pain in your face or pain by your eye.  You lose feeling on one side of your face.  You have trouble seeing.  You have ear pain, or you have ringing in your ear.  You have a loss of taste.  Your condition gets worse.  Summary  Shingles gives you a painful skin rash and blisters that have fluid in them.  Shingles is caused by the same germ (virus) that causes chickenpox.  Keep your rash covered with a loose bandage. Wear loose clothing that does not rub on your rash.  If you have blisters that did not change into scabs yet, try not to touch other people or be around people.  This information is not intended to replace advice given to you by your health care provider. Make sure you discuss any questions you have with your health care provider.  Document Revised: 12/13/2021 Document Reviewed: 12/13/2021  [employer] Patient Education © 2024 [employer]

## 2024-07-22 NOTE — PROGRESS NOTES
"Chief Complaint   Patient presents with    Rash       Video Visit Reason:   Free Text Description: Just checking to see if I may have Shingles.  Subjective   Roz Randolph is a 63 y.o. female.     History of Present Illness  Rash on the right side of the chest that she thinks may be Shingles. She was diagnosed with Covid on Thursday but has not taken any new medications. The rash on the chest is described as red, burning and stinging. It feels a \"little achy\" today, as well.  Rash  This is a new problem. The affected locations include the chest. The rash is characterized by burning and redness. Associated symptoms include congestion and coughing. Pertinent negatives include no fever. Past treatments include nothing. The treatment provided mild relief.       The following portions of the patient's history were reviewed and updated as appropriate: allergies, current medications, past medical history, and problem list.      Past Medical History:   Diagnosis Date    Abnormal liver enzymes     11/2020-  Elevated liver panel significant for positive ARSH, elevated AST/ALT/GGT/and Ferritin    Acute diverticulitis 09/2013    sigmoid colon dx by CT (also episodes 6/14, 9/14, and 12/14)    Anxiety disorder     Closed fracture of distal phalanx or phalanges of hand 11/2013    Of the fourth finger, right    Closed head injury 06/2014    CT negative    COVID-19 virus infection 12/31/2021    Diverticulosis of large intestine     Encounter for screening for cardiovascular disorders 03/28/2022    Coronary Artery Calcium Score 2    Fracture of wrist 03/2008    Gastroesophageal reflux disease     Heart murmur     History of esophagogastroduodenoscopy (EGD) 12/21/2015    small hiatal hernia, mild duodenitis, and concentric espohageal rings suggestive of esophsgitis- path c/w mild chronic esophagitis w/ increased eosinophils suggestive of reflux ([place]adi)    History of varicella     Hyperlipidemia     Description: dx 7/08.    " Hypertension     Description: dx approx 2000.    Hypothyroidism     Description: dx 4/14.    Lung nodule     Description: 9/13- RLL (4 mm) nodule).  5/2/14- stable.    Screening for cardiovascular condition 03/28/2022    Coronary Artery Calcium Score 2    Vitamin D deficiency     Dx 10/19     Social History     Socioeconomic History    Marital status:     Number of children: 2   Tobacco Use    Smoking status: Never     Passive exposure: Past (childhood)    Smokeless tobacco: Never   Vaping Use    Vaping status: Never Used    Passive vaping exposure: Yes (occasional- son)   Substance and Sexual Activity    Alcohol use: Yes     Alcohol/week: 3.0 standard drinks of alcohol     Types: 3 Glasses of wine per week     Comment: 2-3 glasses of wine weekends    Drug use: No    Sexual activity: Yes     Partners: Male     Birth control/protection: Surgical     medication documentation: reviewed and updated with patient and   Current Outpatient Medications:     atorvastatin (LIPITOR) 10 MG tablet, TAKE 1 TABLET BY MOUTH DAILY, Disp: 90 tablet, Rfl: 0    buPROPion XL (WELLBUTRIN XL) 150 MG 24 hr tablet, TAKE ONE TABLET BY MOUTH EVERY MORNING, Disp: 90 tablet, Rfl: 1    Cholecalciferol (VITAMIN D3) 25 MCG (1000 UT) capsule, Take  by mouth., Disp: , Rfl:     fluticasone (FLONASE) 50 MCG/ACT nasal spray, 2 sprays into the nostril(s) as directed by provider Daily As Needed for Allergies., Disp: 16 g, Rfl: 5    levocetirizine (XYZAL) 5 MG tablet, TAKE 1 TABLET BY MOUTH DAILY AS NEEDED FOR ALLERGIES, Disp: 90 tablet, Rfl: 0    levothyroxine (SYNTHROID, LEVOTHROID) 100 MCG tablet, TAKE 1 TABLET BY MOUTH DAILY, Disp: 90 tablet, Rfl: 1    losartan-hydrochlorothiazide (HYZAAR) 100-25 MG per tablet, TAKE 1 TABLET BY MOUTH DAILY, Disp: 90 tablet, Rfl: 1    valACYclovir (Valtrex) 1000 MG tablet, Take 1 tablet by mouth 3 (Three) Times a Day for 7 days., Disp: 21 tablet, Rfl: 0  Review of Systems   Constitutional:  Negative for chills  and fever.   HENT:  Positive for congestion.    Respiratory:  Positive for cough.    Skin:  Positive for rash.       Objective   Physical Exam  Constitutional:       General: She is not in acute distress.  Skin:            Comments: Macular patch of redness, approximately 4cm x 4 cm. No vesicular formation but it is suspicious for shingles   Neurological:      Mental Status: She is alert.         Assessment & Plan   Diagnoses and all orders for this visit:    1. Herpes zoster without complication (Primary)  -     valACYclovir (Valtrex) 1000 MG tablet; Take 1 tablet by mouth 3 (Three) Times a Day for 7 days.  Dispense: 21 tablet; Refill: 0                    Follow Up:  If your symptoms are not resolving by the completion of your treatment or are worsening, see your primary care provider for follow up. If you don't have a primary care provider, you may go to any Urgent Care for re-evaluation. If you develop any life threatening symptoms, go to the nearest Emergency Department immediately or call EMS.               The use of  Video Visit was utilized during this visit, using both [employer] and [employer]. The use of a video visit has been reviewed with the patient and verbal informed consent has been obtained. No technical difficulties occurred during the visit.    is located at [address] [place] [id number]  Provider is located at [place], [place]

## 2024-08-02 DIAGNOSIS — E78.49 OTHER HYPERLIPIDEMIA: ICD-10-CM

## 2024-08-02 RX ORDER — ATORVASTATIN CALCIUM 10 MG/1
10 TABLET, FILM COATED ORAL DAILY
Qty: 90 TABLET | Refills: 0 | Status: SHIPPED | OUTPATIENT
Start: 2024-08-02

## 2024-08-30 DIAGNOSIS — I10 ESSENTIAL HYPERTENSION: ICD-10-CM

## 2024-08-30 DIAGNOSIS — I10 PRIMARY HYPERTENSION: Primary | ICD-10-CM

## 2024-08-30 RX ORDER — LOSARTAN POTASSIUM AND HYDROCHLOROTHIAZIDE 25; 100 MG/1; MG/1
1 TABLET ORAL DAILY
Qty: 90 TABLET | Refills: 1 | Status: SHIPPED | OUTPATIENT
Start: 2024-08-30

## 2024-09-18 DIAGNOSIS — J30.2 SEASONAL ALLERGIC RHINITIS, UNSPECIFIED TRIGGER: ICD-10-CM

## 2024-09-19 RX ORDER — LEVOCETIRIZINE DIHYDROCHLORIDE 5 MG/1
5 TABLET, FILM COATED ORAL DAILY PRN
Qty: 90 TABLET | Refills: 0 | Status: SHIPPED | OUTPATIENT
Start: 2024-09-19

## 2024-09-20 DIAGNOSIS — J30.2 SEASONAL ALLERGIC RHINITIS, UNSPECIFIED TRIGGER: ICD-10-CM

## 2024-09-20 RX ORDER — LEVOCETIRIZINE DIHYDROCHLORIDE 5 MG/1
5 TABLET, FILM COATED ORAL DAILY PRN
Qty: 90 TABLET | Refills: 0 | OUTPATIENT
Start: 2024-09-20

## 2024-10-09 DIAGNOSIS — E03.9 ACQUIRED HYPOTHYROIDISM: ICD-10-CM

## 2024-10-09 DIAGNOSIS — J30.2 SEASONAL ALLERGIC RHINITIS, UNSPECIFIED TRIGGER: ICD-10-CM

## 2024-10-09 RX ORDER — LEVOTHYROXINE SODIUM 100 UG/1
100 TABLET ORAL DAILY
Qty: 90 TABLET | Refills: 1 | Status: SHIPPED | OUTPATIENT
Start: 2024-10-09

## 2024-10-09 RX ORDER — LEVOCETIRIZINE DIHYDROCHLORIDE 5 MG/1
5 TABLET, FILM COATED ORAL DAILY PRN
Qty: 90 TABLET | Refills: 0 | Status: SHIPPED | OUTPATIENT
Start: 2024-10-09

## 2024-10-10 DIAGNOSIS — F41.1 GENERALIZED ANXIETY DISORDER: ICD-10-CM

## 2024-10-10 RX ORDER — BUPROPION HYDROCHLORIDE 150 MG/1
150 TABLET ORAL EVERY MORNING
Qty: 90 TABLET | Refills: 1 | Status: SHIPPED | OUTPATIENT
Start: 2024-10-10

## 2024-11-06 DIAGNOSIS — E78.49 OTHER HYPERLIPIDEMIA: ICD-10-CM

## 2024-11-06 RX ORDER — ATORVASTATIN CALCIUM 10 MG/1
10 TABLET, FILM COATED ORAL DAILY
Qty: 90 TABLET | Refills: 0 | Status: SHIPPED | OUTPATIENT
Start: 2024-11-06

## 2024-11-14 ENCOUNTER — OFFICE VISIT (OUTPATIENT)
Dept: INTERNAL MEDICINE | Facility: CLINIC | Age: 64
End: 2024-11-14
Payer: COMMERCIAL

## 2024-11-14 VITALS
RESPIRATION RATE: 20 BRPM | HEIGHT: 65 IN | OXYGEN SATURATION: 95 % | SYSTOLIC BLOOD PRESSURE: 172 MMHG | DIASTOLIC BLOOD PRESSURE: 98 MMHG | WEIGHT: 177 LBS | BODY MASS INDEX: 29.49 KG/M2 | HEART RATE: 88 BPM

## 2024-11-14 DIAGNOSIS — E55.9 VITAMIN D DEFICIENCY: ICD-10-CM

## 2024-11-14 DIAGNOSIS — E03.9 ACQUIRED HYPOTHYROIDISM: ICD-10-CM

## 2024-11-14 DIAGNOSIS — I10 PRIMARY HYPERTENSION: ICD-10-CM

## 2024-11-14 DIAGNOSIS — E78.5 DYSLIPIDEMIA: Primary | ICD-10-CM

## 2024-11-14 DIAGNOSIS — K21.9 GASTROESOPHAGEAL REFLUX DISEASE, UNSPECIFIED WHETHER ESOPHAGITIS PRESENT: ICD-10-CM

## 2024-11-14 LAB
ALBUMIN SERPL-MCNC: 4.2 G/DL (ref 3.5–5.2)
ALBUMIN/GLOB SERPL: 1.3 G/DL
ALP SERPL-CCNC: 54 U/L (ref 39–117)
ALT SERPL W P-5'-P-CCNC: 45 U/L (ref 1–33)
ANION GAP SERPL CALCULATED.3IONS-SCNC: 11.8 MMOL/L (ref 5–15)
AST SERPL-CCNC: 51 U/L (ref 1–32)
BASOPHILS # BLD AUTO: 0.04 10*3/MM3 (ref 0–0.2)
BASOPHILS NFR BLD AUTO: 0.5 % (ref 0–1.5)
BILIRUB BLD-MCNC: NEGATIVE MG/DL
BILIRUB SERPL-MCNC: 0.7 MG/DL (ref 0–1.2)
BUN SERPL-MCNC: 12 MG/DL (ref 8–23)
BUN/CREAT SERPL: 14 (ref 7–25)
CALCIUM SPEC-SCNC: 9.4 MG/DL (ref 8.6–10.5)
CHLORIDE SERPL-SCNC: 104 MMOL/L (ref 98–107)
CHOLEST SERPL-MCNC: 188 MG/DL (ref 0–200)
CLARITY, POC: CLEAR
CO2 SERPL-SCNC: 25.2 MMOL/L (ref 22–29)
COLOR UR: YELLOW
CREAT SERPL-MCNC: 0.86 MG/DL (ref 0.57–1)
DEPRECATED RDW RBC AUTO: 41.8 FL (ref 37–54)
EGFRCR SERPLBLD CKD-EPI 2021: 76.5 ML/MIN/1.73
EOSINOPHIL # BLD AUTO: 0.39 10*3/MM3 (ref 0–0.4)
EOSINOPHIL NFR BLD AUTO: 4.9 % (ref 0.3–6.2)
ERYTHROCYTE [DISTWIDTH] IN BLOOD BY AUTOMATED COUNT: 11.7 % (ref 12.3–15.4)
EXPIRATION DATE: NORMAL
GLOBULIN UR ELPH-MCNC: 3.2 GM/DL
GLUCOSE SERPL-MCNC: 89 MG/DL (ref 65–99)
GLUCOSE UR STRIP-MCNC: NEGATIVE MG/DL
HCT VFR BLD AUTO: 40.1 % (ref 34–46.6)
HDLC SERPL-MCNC: 52 MG/DL (ref 40–60)
HGB BLD-MCNC: 14.1 G/DL (ref 12–15.9)
IMM GRANULOCYTES # BLD AUTO: 0.04 10*3/MM3 (ref 0–0.05)
IMM GRANULOCYTES NFR BLD AUTO: 0.5 % (ref 0–0.5)
KETONES UR QL: NEGATIVE
LDLC SERPL CALC-MCNC: 115 MG/DL (ref 0–100)
LDLC/HDLC SERPL: 2.17 {RATIO}
LEUKOCYTE EST, POC: NEGATIVE
LYMPHOCYTES # BLD AUTO: 2.19 10*3/MM3 (ref 0.7–3.1)
LYMPHOCYTES NFR BLD AUTO: 27.3 % (ref 19.6–45.3)
Lab: NORMAL
MCH RBC QN AUTO: 34.2 PG (ref 26.6–33)
MCHC RBC AUTO-ENTMCNC: 35.2 G/DL (ref 31.5–35.7)
MCV RBC AUTO: 97.3 FL (ref 79–97)
MONOCYTES # BLD AUTO: 0.7 10*3/MM3 (ref 0.1–0.9)
MONOCYTES NFR BLD AUTO: 8.7 % (ref 5–12)
NEUTROPHILS NFR BLD AUTO: 4.65 10*3/MM3 (ref 1.7–7)
NEUTROPHILS NFR BLD AUTO: 58.1 % (ref 42.7–76)
NITRITE UR-MCNC: NEGATIVE MG/ML
NRBC BLD AUTO-RTO: 0 /100 WBC (ref 0–0.2)
PH UR: 6 [PH] (ref 5–8)
PLATELET # BLD AUTO: 243 10*3/MM3 (ref 140–450)
PMV BLD AUTO: 10.6 FL (ref 6–12)
POTASSIUM SERPL-SCNC: 3.8 MMOL/L (ref 3.5–5.2)
PROT SERPL-MCNC: 7.4 G/DL (ref 6–8.5)
PROT UR STRIP-MCNC: NEGATIVE MG/DL
RBC # BLD AUTO: 4.12 10*6/MM3 (ref 3.77–5.28)
RBC # UR STRIP: NEGATIVE /UL
SODIUM SERPL-SCNC: 141 MMOL/L (ref 136–145)
SP GR UR: 1.02 (ref 1–1.03)
TRIGL SERPL-MCNC: 116 MG/DL (ref 0–150)
TSH SERPL DL<=0.05 MIU/L-ACNC: 2.83 UIU/ML (ref 0.27–4.2)
UROBILINOGEN UR QL: NORMAL
VLDLC SERPL-MCNC: 21 MG/DL (ref 5–40)
WBC NRBC COR # BLD AUTO: 8.01 10*3/MM3 (ref 3.4–10.8)

## 2024-11-14 PROCEDURE — 80050 GENERAL HEALTH PANEL: CPT | Performed by: INTERNAL MEDICINE

## 2024-11-14 PROCEDURE — 80061 LIPID PANEL: CPT | Performed by: INTERNAL MEDICINE

## 2024-11-14 NOTE — PROGRESS NOTES
Subjective       Roz Randolph is a 64 y.o. female.     Chief Complaint   Patient presents with    Hypertension     6 month follow up   Pt is Fasting    Hyperlipidemia    Hypothyroidism       History obtained from the patient.      History of Present Illness     Of note, the patient a chest CT on 2/1/2018 which showed an on calcified 4 mm Right Lower Lobe Lung Nodule, stable x 2 years, no further imaging recommended.      Cardiac Follow-up: The patient is here today for a 6 month follow-up visit.      Her Hypertension has been stable.   Medication: Losartan / HCTZ.   Her Hyperlipidemia has been stable.   Her LDL goal is < 130,  last LDL was 104, .   Medication: Atorvastatin  Side Effects: None.     Procedures: On 3/28/2022, Coronary Artery Calcium Score was 2.      Interval Events:  The patient states her blood pressure at home has been 120's / 80's, but sometimes as high as 140s/90s.       Symptoms:  Denies chest pain, dyspnea, PORTILLO, orthopnea, PND, palpitations, syncope, lower extremity edema, claudication, lightheadedness, dizziness.  Associated Symptoms: No significant weight change in the past.  No fatigue, headache, polydipsia, polyuria, myalgias, arthralgias, visual impairment, memory loss, or concentration problems.     Lifestyle: She consumes a half healthy diet, overall, but admits to excess salt intake.  She walks daily and is active at work.  Tobacco Use: Never a Smoker.      Hypothyroidism Follow-Up: The patient is being seen for follow-up of Hypothyroidism, which is stable.   Interval Events: On 4/25/2024, TSH was normal.  Symptoms: No significant weight change in the past 6 months.  She denies fatigue, heat/cold intolerance, constipation, diarrhea, memory loss, trouble concentrating, hair loss, and dry skin.   Associated Symptoms: no myalgias, arthralgias, or paresthesias.   Medications:  Levothyroxine (Synthroid).       GERD Follow-up: The patient is being seen for a routine clinic  follow-up of Gastroesophageal Reflux Disease, which is stable.  Procedures:  EGD 12/21/15 (small hiatal hernia, mild duodenitis, and concentric espohageal rings suggestive of esophsgitis- path c/w mild chronic esophagitis w/ increased eosinophils suggestive of reflux ([place]).   Comorbid Illness: On 6/16/2020, liver enzymes were elevated ( and ALT 87). Elevated liver panel done on 11/19/2020 was significant for an elevated ARSH, GGT (249), Ferritin (778), ALT (80),  and AST (98 ).  Liver ultrasound was ordered on 1/25/2021, but not done.  She saw Dr. Masters on 1/28/2021.  He felt her ARSH was a false positive.  Additional labs were done on 8/25/2021 and were negative with the exception of an elevated AST (57) and ALT (68).  Last labs 4/25/2024 were significant for elevated AST (67) and ALT (66)  Interval Events:  None.  Symptoms:   No abdominal pain, heartburn, acid regurgitation, nausea, vomiting, dysphagia, odynophagia, hematemesis, melena, early satiety, belching, and bloating.  Associated Symptoms:  No chronic cough, sore throat, hoarseness, or wheezing.   Medication: Tums prn.      Vitamin D Deficiency Follow-Up: The patient is being seen for follow-up of Vitamin D Deficiency, which is stable.    Interval Events: Vitamin D level on/25/24 was 38.4.   Symptoms: No fatigue, myalgias, arthralgias, paresthesias, balance issues, or gait abnormality.  Medication: Vitamin D3 (cholecalciferol) 2000 IU daily.  She has been off for several weeks.      Anxiety Disorder Follow-Up: The patient is being seen for follow-up of Anxiety, which is stable.  Comorbid Illnesses None.   Interval Events:  None.   Symptoms: Stable anxiety.  Insomnia has resolved.  Denies depression, panic attacks, anhedonia, memory loss, and difficulty concentrating.  Associated Symptoms: no suicidal ideation or thoughts of self-harm.   Medication: Wellbutrin XL.   Side Effects: None.    Current Outpatient Medications on File Prior to Visit    Medication Sig Dispense Refill    atorvastatin (LIPITOR) 10 MG tablet TAKE 1 TABLET BY MOUTH DAILY 90 tablet 0    buPROPion XL (WELLBUTRIN XL) 150 MG 24 hr tablet Take 1 tablet by mouth Every Morning. 90 tablet 1    Cholecalciferol (VITAMIN D3) 25 MCG (1000 UT) capsule Take  by mouth.      fluticasone (FLONASE) 50 MCG/ACT nasal spray 2 sprays into the nostril(s) as directed by provider Daily As Needed for Allergies. 16 g 5    levocetirizine (XYZAL) 5 MG tablet TAKE 1 TABLET BY MOUTH DAILY AS NEEDED FOR ALLERGIES 90 tablet 0    levothyroxine (SYNTHROID, LEVOTHROID) 100 MCG tablet TAKE 1 TABLET BY MOUTH DAILY 90 tablet 1    losartan-hydrochlorothiazide (HYZAAR) 100-25 MG per tablet Take 1 tablet by mouth Daily. 90 tablet 1     No current facility-administered medications on file prior to visit.       Current outpatient and discharge medications have been reconciled for the patient.  Reviewed by: Maia Melton MD        The following portions of the patient's history were reviewed and updated as appropriate: allergies, current medications, past family history, past medical history, past social history, past surgical history, and problem list.    Review of Systems   Constitutional:  Negative for fatigue and unexpected weight change.   Eyes:  Negative for visual disturbance.   Respiratory:  Negative for cough, shortness of breath and wheezing.    Cardiovascular:  Negative for chest pain, palpitations and leg swelling.        No PORTILLO, orthopnea, or claudication.   Gastrointestinal:  Negative for abdominal pain, blood in stool, constipation, diarrhea, nausea and vomiting.        Denies melena.   Endocrine: Negative for polydipsia, polyphagia and polyuria.   Musculoskeletal:  Negative for arthralgias and myalgias.   Neurological:  Negative for dizziness, syncope, light-headedness and headaches.        No memory issues.   Psychiatric/Behavioral:  Negative for decreased concentration.          Objective       Blood pressure  "172/98, pulse 88, resp. rate 20, height 166.1 cm (65.39\"), weight 80.3 kg (177 lb), SpO2 95%.  Body mass index is 29.1 kg/m².      Physical Exam  Vitals and nursing note reviewed.   Constitutional:       Appearance: She is well-developed.      Comments: BMI greater than 25   Neck:      Thyroid: No thyroid mass or thyromegaly.      Vascular: No carotid bruit.   Cardiovascular:      Rate and Rhythm: Normal rate and regular rhythm.      Pulses: Normal pulses.      Heart sounds: Normal heart sounds. No murmur heard.  Pulmonary:      Effort: Pulmonary effort is normal.      Breath sounds: Normal breath sounds.   Musculoskeletal:      Right lower leg: No edema.      Left lower leg: No edema.   Neurological:      Mental Status: She is alert.   Psychiatric:         Mood and Affect: Mood normal.       Results for orders placed or performed in visit on 11/14/24   POC Urinalysis Dipstick, Automated    Collection Time: 11/14/24  8:43 AM    Specimen: Urine   Result Value Ref Range    Color Yellow Yellow, Straw, Dark Yellow, María Elena    Clarity, UA Clear Clear    Specific Gravity  1.020 1.005 - 1.030    pH, Urine 6.0 5.0 - 8.0    Leukocytes Negative Negative    Nitrite, UA Negative Negative    Protein, POC Negative Negative mg/dL    Glucose, UA Negative Negative mg/dL    Ketones, UA Negative Negative    Urobilinogen, UA Normal Normal, 0.2 E.U./dL    Bilirubin Negative Negative    Blood, UA Negative Negative    Lot Number [id number]     Expiration Date 3/31/25        Assessment / Plan:  Diagnoses and all orders for this visit:    1. Dyslipidemia (Primary)  -     Lipid Panel  -     Comprehensive Metabolic Panel  -     TSH  -     CBC & Differential   Continue current medication(s) as noted in the history of present illness.    2. Primary hypertension  -     POC Urinalysis Dipstick, Automated  -     Lipid Panel  -     Comprehensive Metabolic Panel  -     TSH  -     CBC & Differential   Continue current medication(s) as noted in the " history of present illness.   Low-salt diet information provided on AVS.    3. Acquired hypothyroidism  -     TSH   Continue current medication(s) as noted in the history of present illness.    4. Gastroesophageal reflux disease, unspecified whether esophagitis present   Continue current medication(s) as noted in the history of present illness.    5. Vitamin D deficiency   Continue Vitamin D supplementation.      I recommended Shingrix (Shingles vaccine) at the pharmacy,.  I also recommended the Influenza vaccine and the COVID-19 bivalent vaccine, in our office or at the pharmacy.  The patient declined both vaccines today, but states she will do them later.    The patient agrees to schedule her Mammogram (due 12/2023) and DEXA  Scan,(previously ordered 4/2024).         Return in about 6 months (around 5/14/2025) for Annual physical, fasting.

## 2024-11-14 NOTE — PATIENT INSTRUCTIONS
"I recommend Shingrix (Shingles vaccine) at the pharmacy, as we discussed.  I also recommend the Influenza vaccine and the COVID-19 bivalent vaccine, in our office or at the pharmacy, as we discussed.    Please schedule your Mammogram and DEXA bone density tests, as we discussed.    Low-Sodium Eating Plan  Salt (sodium) helps you keep a healthy balance of fluids in your body. Too much sodium can raise your blood pressure. It can also cause fluid and waste to be held in your body.  Your health care provider or dietitian may recommend a low-sodium eating plan if you have high blood pressure (hypertension), kidney disease, liver disease, or heart failure. Eating less sodium can help lower your blood pressure and reduce swelling. It can also protect your heart, liver, and kidneys.  What are tips for following this plan?  Reading food labels    Check food labels for the amount of sodium per serving. If you eat more than one serving, you must multiply the listed amount by the number of servings.  Choose foods with less than 140 milligrams (mg) of sodium per serving.  Avoid foods with 300 mg of sodium or more per serving.  Always check how much sodium is in a product, even if the label says \"unsalted\" or \"no salt added.\"  Shopping    Buy products labeled as \"low-sodium\" or \"no salt added.\"  Buy fresh foods.  Avoid canned foods and pre-made or frozen meals.  Avoid canned, cured, or processed meats.  Buy breads that have less than 80 mg of sodium per slice.  Cooking    Eat more home-cooked food. Try to eat less restaurant, buffet, and fast food.  Try not to add salt when you cook. Use salt-free seasonings or herbs instead of table salt or sea salt. Check with your provider or pharmacist before using salt substitutes.  Cook with plant-based oils, such as canola, sunflower, or olive oil.  Meal planning  When eating at a restaurant, ask if your food can be made with less salt or no salt. Avoid dishes labeled as brined, pickled, " "cured, or smoked. Avoid dishes made with soy sauce, miso, or teriyaki sauce.  Avoid foods that have monosodium glutamate (MSG) in them. MSG may be added to some restaurant food, sauces, soups, bouillon, and canned foods.  Make meals that can be grilled, baked, poached, roasted, or steamed. These are often made with less sodium.  General information  Try to limit your sodium intake to 1,500-2,300 mg each day, or the amount told by your provider.  What foods should I eat?  Fruits  Fresh, frozen, or canned fruit. Fruit juice.  Vegetables  Fresh or frozen vegetables. \"No salt added\" canned vegetables. \"No salt added\" tomato sauce and paste. Low-sodium or reduced-sodium tomato and vegetable juice.  Grains  Low-sodium cereals, such as oats, puffed wheat and rice, and shredded wheat. Low-sodium crackers. Unsalted rice. Unsalted pasta. Low-sodium bread. Whole grain breads and whole grain pasta.  Meats and other proteins  Fresh or frozen meat, poultry, seafood, and fish. These should have no added salt. Low-sodium canned tuna and salmon. Unsalted nuts. Dried peas, beans, and lentils without added salt. Unsalted canned beans. Eggs. Unsalted nut butters.  Dairy  Milk. Soy milk. Cheese that is naturally low in sodium, such as ricotta cheese, fresh mozzarella, or Swiss cheese. Low-sodium or reduced-sodium cheese. Cream cheese. Yogurt.  Seasonings and condiments  Fresh and dried herbs and spices. Salt-free seasonings. Low-sodium mustard and ketchup. Sodium-free salad dressing. Sodium-free light mayonnaise. Fresh or refrigerated horseradish. Lemon juice. Vinegar.  Other foods  Homemade, reduced-sodium, or low-sodium soups. Unsalted popcorn and pretzels. Low-salt or salt-free chips.  The items listed above may not be all the foods and drinks you can have. Talk to a dietitian to learn more.  What foods should I avoid?  Vegetables  Sauerkraut, pickled vegetables, and relishes. Olives. French fries. Onion rings. Regular canned " vegetables, except low-sodium or reduced-sodium items. Regular canned tomato sauce and paste. Regular tomato and vegetable juice. Frozen vegetables in sauces.  Grains  Instant hot cereals. Bread stuffing, pancake, and biscuit mixes. Croutons. Seasoned rice or pasta mixes. Noodle soup cups. Boxed or frozen macaroni and cheese. Regular salted crackers. Self-rising flour.  Meats and other proteins  Meat or fish that is salted, canned, smoked, spiced, or pickled. Precooked or cured meat, such as sausages or meat loaves. Gomez. Ham. Pepperoni. Hot dogs. Corned beef. Chipped beef. Salt pork. Jerky. Pickled herring, anchovies, and sardines. Regular canned tuna. Salted nuts.  Dairy  Processed cheese and cheese spreads. Hard cheeses. Cheese curds. Blue cheese. Feta cheese. String cheese. Regular cottage cheese. Buttermilk. Canned milk.  Fats and oils  Salted butter. Regular margarine. Ghee. Gomez fat.  Seasonings and condiments  Onion salt, garlic salt, seasoned salt, table salt, and sea salt. Canned and packaged gravies. Worcestershire sauce. Tartar sauce. Barbecue sauce. Teriyaki sauce. Soy sauce, including reduced-sodium soy sauce. Steak sauce. Fish sauce. Oyster sauce. Cocktail sauce. Horseradish that you find on the shelf. Regular ketchup and mustard. Meat flavorings and tenderizers. Bouillon cubes. Hot sauce. Pre-made or packaged marinades. Pre-made or packaged taco seasonings. Relishes. Regular salad dressings. Salsa.  Other foods  Salted popcorn and pretzels. Corn chips and puffs. Potato and tortilla chips. Canned or dried soups. Pizza. Frozen entrees and pot pies.  The items listed above may not be all the foods and drinks you should avoid. Talk to a dietitian to learn more.  This information is not intended to replace advice given to you by your health care provider. Make sure you discuss any questions you have with your health care provider.  Document Revised: 01/04/2024 Document Reviewed: 01/04/2024  Elsevier  Patient Education © 2024 [employer]

## 2024-11-16 PROBLEM — D75.89 MACROCYTOSIS: Status: RESOLVED | Noted: 2018-03-03 | Resolved: 2024-11-16

## 2024-11-16 PROBLEM — R21 RASH: Status: RESOLVED | Noted: 2022-05-03 | Resolved: 2024-11-16

## 2024-12-17 DIAGNOSIS — E03.9 ACQUIRED HYPOTHYROIDISM: ICD-10-CM

## 2024-12-17 RX ORDER — LEVOTHYROXINE SODIUM 100 UG/1
100 TABLET ORAL DAILY
Qty: 90 TABLET | Refills: 1 | Status: SHIPPED | OUTPATIENT
Start: 2024-12-17

## 2025-01-16 DIAGNOSIS — J30.2 SEASONAL ALLERGIC RHINITIS, UNSPECIFIED TRIGGER: ICD-10-CM

## 2025-01-16 RX ORDER — LEVOCETIRIZINE DIHYDROCHLORIDE 5 MG/1
5 TABLET, FILM COATED ORAL DAILY PRN
Qty: 90 TABLET | Refills: 0 | Status: SHIPPED | OUTPATIENT
Start: 2025-01-16

## 2025-02-02 ENCOUNTER — DOCUMENTATION (OUTPATIENT)
Dept: INTERNAL MEDICINE | Facility: CLINIC | Age: 65
End: 2025-02-02
Payer: COMMERCIAL

## 2025-02-02 RX ORDER — BENZONATATE 200 MG/1
200 CAPSULE ORAL 3 TIMES DAILY PRN
Qty: 30 CAPSULE | Refills: 0 | Status: SHIPPED | OUTPATIENT
Start: 2025-02-02

## 2025-02-02 RX ORDER — METHYLPREDNISOLONE 4 MG/1
TABLET ORAL
Qty: 21 TABLET | Refills: 0 | Status: SHIPPED | OUTPATIENT
Start: 2025-02-02

## 2025-02-02 RX ORDER — AZITHROMYCIN 250 MG/1
TABLET, FILM COATED ORAL
Qty: 6 TABLET | Refills: 0 | Status: SHIPPED | OUTPATIENT
Start: 2025-02-02

## 2025-02-24 DIAGNOSIS — I10 PRIMARY HYPERTENSION: ICD-10-CM

## 2025-02-24 RX ORDER — LOSARTAN POTASSIUM AND HYDROCHLOROTHIAZIDE 25; 100 MG/1; MG/1
1 TABLET ORAL DAILY
Qty: 90 TABLET | Refills: 3 | Status: SHIPPED | OUTPATIENT
Start: 2025-02-24

## 2025-03-01 ENCOUNTER — TELEPHONE (OUTPATIENT)
Dept: INTERNAL MEDICINE | Facility: CLINIC | Age: 65
End: 2025-03-01
Payer: COMMERCIAL

## 2025-03-01 DIAGNOSIS — B02.9 HERPES ZOSTER WITHOUT COMPLICATION: Primary | ICD-10-CM

## 2025-03-01 RX ORDER — VALACYCLOVIR HYDROCHLORIDE 1 G/1
1000 TABLET, FILM COATED ORAL 3 TIMES DAILY
Qty: 21 TABLET | Refills: 0 | Status: SHIPPED | OUTPATIENT
Start: 2025-03-01 | End: 2025-03-08

## 2025-03-01 NOTE — TELEPHONE ENCOUNTER
Patient called with blistering, painful rash on neck/shoulder area. States feels like when she had shingles previously.  immunocompromised. Will call in valtrex for one week. Counseled on indications to seek further care.    Dr. Karimi

## 2025-03-17 ENCOUNTER — TELEPHONE (OUTPATIENT)
Dept: INTERNAL MEDICINE | Facility: CLINIC | Age: 65
End: 2025-03-17
Payer: COMMERCIAL

## 2025-03-17 NOTE — TELEPHONE ENCOUNTER
Spoke with patient she needs TB test completed for work, advised due to billing issues we can't do it here but she can go to a walk in clinic.

## 2025-03-17 NOTE — TELEPHONE ENCOUNTER
Caller: Roz Randolph    Relationship: Self    Best call back number: [phone]     What orders are you requesting (i.e. lab or imaging): TB TEST    In what timeframe would the patient need to come in: ASAP    Where will you receive your lab/imaging services: AT PCP OFFICE

## 2025-04-06 DIAGNOSIS — F41.1 GENERALIZED ANXIETY DISORDER: ICD-10-CM

## 2025-04-07 RX ORDER — BUPROPION HYDROCHLORIDE 150 MG/1
TABLET ORAL
Qty: 90 TABLET | Refills: 1 | Status: SHIPPED | OUTPATIENT
Start: 2025-04-07

## 2025-05-30 ENCOUNTER — OFFICE VISIT (OUTPATIENT)
Dept: INTERNAL MEDICINE | Facility: CLINIC | Age: 65
End: 2025-05-30
Payer: COMMERCIAL

## 2025-05-30 VITALS
BODY MASS INDEX: 28.45 KG/M2 | SYSTOLIC BLOOD PRESSURE: 148 MMHG | TEMPERATURE: 97.1 F | DIASTOLIC BLOOD PRESSURE: 90 MMHG | WEIGHT: 173 LBS | HEART RATE: 60 BPM | RESPIRATION RATE: 10 BRPM

## 2025-05-30 DIAGNOSIS — J30.2 SEASONAL ALLERGIC RHINITIS, UNSPECIFIED TRIGGER: ICD-10-CM

## 2025-05-30 DIAGNOSIS — E78.5 DYSLIPIDEMIA: ICD-10-CM

## 2025-05-30 DIAGNOSIS — Z78.0 MENOPAUSE: ICD-10-CM

## 2025-05-30 DIAGNOSIS — M54.2 NECK PAIN, CHRONIC: ICD-10-CM

## 2025-05-30 DIAGNOSIS — Z00.00 ENCOUNTER FOR HEALTH MAINTENANCE EXAMINATION IN ADULT: Primary | ICD-10-CM

## 2025-05-30 DIAGNOSIS — E03.9 ACQUIRED HYPOTHYROIDISM: ICD-10-CM

## 2025-05-30 DIAGNOSIS — Z12.31 ENCOUNTER FOR SCREENING MAMMOGRAM FOR BREAST CANCER: ICD-10-CM

## 2025-05-30 DIAGNOSIS — Z23 NEED FOR VACCINATION FOR PNEUMOCOCCUS: ICD-10-CM

## 2025-05-30 DIAGNOSIS — K21.9 GASTROESOPHAGEAL REFLUX DISEASE, UNSPECIFIED WHETHER ESOPHAGITIS PRESENT: ICD-10-CM

## 2025-05-30 DIAGNOSIS — I10 PRIMARY HYPERTENSION: ICD-10-CM

## 2025-05-30 DIAGNOSIS — R05.3 CHRONIC COUGH: ICD-10-CM

## 2025-05-30 DIAGNOSIS — E55.9 VITAMIN D DEFICIENCY: ICD-10-CM

## 2025-05-30 DIAGNOSIS — F41.1 GENERALIZED ANXIETY DISORDER: ICD-10-CM

## 2025-05-30 DIAGNOSIS — G89.29 NECK PAIN, CHRONIC: ICD-10-CM

## 2025-05-30 LAB
25(OH)D3 SERPL-MCNC: 19.4 NG/ML (ref 30–100)
ALBUMIN SERPL-MCNC: 4.6 G/DL (ref 3.5–5.2)
ALBUMIN/GLOB SERPL: 1.6 G/DL
ALP SERPL-CCNC: 54 U/L (ref 39–117)
ALT SERPL W P-5'-P-CCNC: 62 U/L (ref 1–33)
ANION GAP SERPL CALCULATED.3IONS-SCNC: 12.5 MMOL/L (ref 5–15)
AST SERPL-CCNC: 61 U/L (ref 1–32)
BASOPHILS # BLD AUTO: 0.03 10*3/MM3 (ref 0–0.2)
BASOPHILS NFR BLD AUTO: 0.4 % (ref 0–1.5)
BILIRUB SERPL-MCNC: 0.7 MG/DL (ref 0–1.2)
BUN SERPL-MCNC: 12 MG/DL (ref 8–23)
BUN/CREAT SERPL: 12.8 (ref 7–25)
CALCIUM SPEC-SCNC: 9.3 MG/DL (ref 8.6–10.5)
CHLORIDE SERPL-SCNC: 100 MMOL/L (ref 98–107)
CHOLEST SERPL-MCNC: 184 MG/DL (ref 0–200)
CO2 SERPL-SCNC: 25.5 MMOL/L (ref 22–29)
CREAT SERPL-MCNC: 0.94 MG/DL (ref 0.57–1)
DEPRECATED RDW RBC AUTO: 40.9 FL (ref 37–54)
EGFRCR SERPLBLD CKD-EPI 2021: 68.7 ML/MIN/1.73
EOSINOPHIL # BLD AUTO: 0.31 10*3/MM3 (ref 0–0.4)
EOSINOPHIL NFR BLD AUTO: 4.1 % (ref 0.3–6.2)
ERYTHROCYTE [DISTWIDTH] IN BLOOD BY AUTOMATED COUNT: 11.5 % (ref 12.3–15.4)
GLOBULIN UR ELPH-MCNC: 2.9 GM/DL
GLUCOSE SERPL-MCNC: 97 MG/DL (ref 65–99)
HCT VFR BLD AUTO: 41.5 % (ref 34–46.6)
HDLC SERPL-MCNC: 57 MG/DL (ref 40–60)
HGB BLD-MCNC: 14.3 G/DL (ref 12–15.9)
IMM GRANULOCYTES # BLD AUTO: 0.02 10*3/MM3 (ref 0–0.05)
IMM GRANULOCYTES NFR BLD AUTO: 0.3 % (ref 0–0.5)
LDLC SERPL CALC-MCNC: 110 MG/DL (ref 0–100)
LDLC/HDLC SERPL: 1.91 {RATIO}
LYMPHOCYTES # BLD AUTO: 1.75 10*3/MM3 (ref 0.7–3.1)
LYMPHOCYTES NFR BLD AUTO: 23.4 % (ref 19.6–45.3)
MCH RBC QN AUTO: 34 PG (ref 26.6–33)
MCHC RBC AUTO-ENTMCNC: 34.5 G/DL (ref 31.5–35.7)
MCV RBC AUTO: 98.6 FL (ref 79–97)
MONOCYTES # BLD AUTO: 0.65 10*3/MM3 (ref 0.1–0.9)
MONOCYTES NFR BLD AUTO: 8.7 % (ref 5–12)
NEUTROPHILS NFR BLD AUTO: 4.72 10*3/MM3 (ref 1.7–7)
NEUTROPHILS NFR BLD AUTO: 63.1 % (ref 42.7–76)
NRBC BLD AUTO-RTO: 0 /100 WBC (ref 0–0.2)
PLATELET # BLD AUTO: 253 10*3/MM3 (ref 140–450)
PMV BLD AUTO: 10.4 FL (ref 6–12)
POTASSIUM SERPL-SCNC: 4 MMOL/L (ref 3.5–5.2)
PROT SERPL-MCNC: 7.5 G/DL (ref 6–8.5)
RBC # BLD AUTO: 4.21 10*6/MM3 (ref 3.77–5.28)
SODIUM SERPL-SCNC: 138 MMOL/L (ref 136–145)
T4 FREE SERPL-MCNC: 1.23 NG/DL (ref 0.92–1.68)
TRIGL SERPL-MCNC: 92 MG/DL (ref 0–150)
TSH SERPL DL<=0.05 MIU/L-ACNC: 1.39 UIU/ML (ref 0.27–4.2)
VLDLC SERPL-MCNC: 17 MG/DL (ref 5–40)
WBC NRBC COR # BLD AUTO: 7.48 10*3/MM3 (ref 3.4–10.8)

## 2025-05-30 PROCEDURE — 82306 VITAMIN D 25 HYDROXY: CPT | Performed by: INTERNAL MEDICINE

## 2025-05-30 PROCEDURE — 99396 PREV VISIT EST AGE 40-64: CPT | Performed by: INTERNAL MEDICINE

## 2025-05-30 PROCEDURE — 80061 LIPID PANEL: CPT | Performed by: INTERNAL MEDICINE

## 2025-05-30 PROCEDURE — 80050 GENERAL HEALTH PANEL: CPT | Performed by: INTERNAL MEDICINE

## 2025-05-30 PROCEDURE — 99214 OFFICE O/P EST MOD 30 MIN: CPT | Performed by: INTERNAL MEDICINE

## 2025-05-30 PROCEDURE — 84439 ASSAY OF FREE THYROXINE: CPT | Performed by: INTERNAL MEDICINE

## 2025-05-30 RX ORDER — LEVOCETIRIZINE DIHYDROCHLORIDE 5 MG/1
5 TABLET, FILM COATED ORAL DAILY PRN
Qty: 90 TABLET | Refills: 3 | Status: SHIPPED | OUTPATIENT
Start: 2025-05-30

## 2025-05-30 RX ORDER — CYCLOBENZAPRINE HCL 10 MG
10 TABLET ORAL NIGHTLY PRN
Qty: 30 TABLET | Refills: 1 | Status: SHIPPED | OUTPATIENT
Start: 2025-05-30

## 2025-05-30 RX ORDER — METOPROLOL SUCCINATE 25 MG/1
25 TABLET, EXTENDED RELEASE ORAL DAILY
Qty: 30 TABLET | Refills: 5 | Status: SHIPPED | OUTPATIENT
Start: 2025-05-30

## 2025-05-30 RX ORDER — FLUTICASONE PROPIONATE 50 MCG
2 SPRAY, SUSPENSION (ML) NASAL DAILY PRN
Qty: 48 G | Refills: 3 | Status: SHIPPED | OUTPATIENT
Start: 2025-05-30

## 2025-05-30 NOTE — PROGRESS NOTES
Subjective     Chief Complaint:  Physical Exam.    Chief Complaint   Patient presents with    Annual Exam    Hypertension     6-month follow-up    Hyperlipidemia    Hypothyroidism       History of Present Illness    History obtained from the patient.    Of note, the patient a chest CT on 2/1/2018 which showed an on calcified 4 mm Right Lower Lobe Lung Nodule, stable x 2 years, no further imaging recommended.      Cardiac Follow-up: The patient is here today for a 6 month follow-up visit.       Her Hypertension has been unstable.   Medication: Losartan / HCTZ.   Her Hyperlipidemia has been stable.   Her LDL goal is < 130,  last LDL was 115, .   Medication: Atorvastatin  Side Effects: None.     Procedures: On 3/28/2022, Coronary Artery Calcium Score was 2.      Interval Events: On 4/25/2024, blood pressure was 158/96.  There were no medication changes.  On 11/14/2024, BP was 172/98.  There were no medication changes but a low-salt diet was encouraged since the patient had admitted to excess salt intake.  The patient states her blood pressure at home has been 120's / 80's, but sometimes as high as 140s/90s.       Symptoms: She reports occasional lightheadedness and dizziness, usually due to stiffness.  She has these episodes 3 times per year, for several years, lasting a few days each episode.  She states Advil helps.  Denies chest pain, dyspnea, PORTILLO, orthopnea, PND, palpitations, syncope, lower extremity edema, and claudication.  Associated Symptoms: Weight decreased 4 pounds in the past 6 months and 3 pounds in the past 1 year, intentionally.  Reports mild arthralgias (hands, feet, fingers, knees, and shoulders).  No fatigue, headache, polydipsia, polyuria, arthralgias, visual impairment, memory loss, or concentration problems.     Lifestyle: She consumes a 3/4 healthy diet, overall.  She walks 3 times a week and is active at work.  Tobacco Use: Never a Smoker.      Hypothyroidism Follow-Up: The  patient is being seen for follow-up of Hypothyroidism, which is stable.   Interval Events: On 11/14/2024, TSH was normal.  Symptoms: Weight decreased 4 pounds in the past 6 months and 3 pounds in the past 1 year, intentionally. She denies fatigue, heat/cold intolerance, constipation, diarrhea, memory loss, trouble concentrating, hair loss, and dry skin.   Associated Symptoms: no myalgias, arthralgias, or paresthesias.   Medications:  Levothyroxine (Synthroid).       GERD Follow-up: The patient is being seen for a routine clinic follow-up of Gastroesophageal Reflux Disease, which is stable.  Procedures:  EGD 12/21/15 (small hiatal hernia, mild duodenitis, and concentric espohageal rings suggestive of esophsgitis- path c/w mild chronic esophagitis w/ increased eosinophils suggestive of reflux ([place]).   Comorbid Illness: On 6/16/2020, liver enzymes were elevated ( and ALT 87). Elevated liver panel done on 11/19/2020 was significant for an elevated ARSH, GGT (249), Ferritin (778), ALT (80),  and AST (98 ).  Liver ultrasound was ordered on 1/25/2021, but not done.  She saw Dr. Masters on 1/28/2021.  He felt her ARSH was a false positive.  Additional labs were done on 8/25/2021 and were negative with the exception of an elevated AST (57) and ALT (68).  Last labs 1/14/2024 were significant for elevated but stable AST (45) and ALT (51)  Interval Events:  None.  Symptoms:   No abdominal pain, heartburn, acid regurgitation, nausea, vomiting, dysphagia, odynophagia, hematemesis, melena, early satiety, belching, and bloating.  Associated Symptoms: Reports a chronic cough.  Gets worse a few times per year.  She thinks it may be due to nerves/anxiety.  No chronic sore throat, hoarseness, or wheezing.   Medication: Tums prn.  No relief with Tessalon perles, Mucinex, and Delsym for the cough.  She takes an allergy medicine daily.     Vitamin D Deficiency Follow-Up: The patient is being seen for follow-up of Vitamin D  Deficiency, which is stable.    Interval Events: Vitamin D level on 4/25/2024 was 38.4.   Symptoms: No fatigue, myalgias, arthralgias, paresthesias, balance issues, or gait abnormality.  Medication: Vitamin D3 (cholecalciferol) 2000 IU daily.  She has been off for several weeks.       Anxiety Disorder Follow-Up: The patient is being seen for follow-up of Anxiety, which is stable.  Comorbid Illnesses None.   Interval Events:  None.   Symptoms: Stable anxiety.  Insomnia has resolved.  Denies depression, panic attacks, anhedonia, memory loss, and difficulty concentrating.  Associated Symptoms: no suicidal ideation or thoughts of self-harm.   Medication: Wellbutrin XL.   Side Effects: None.       Roz Randolph is a 64 y.o. female who presents for an Annual Physical.      PMH, PSH, SocHx, FamHx, Allergies, and Medications: Reviewed and updated.    Outpatient Medications Prior to Visit   Medication Sig Dispense Refill    atorvastatin (LIPITOR) 10 MG tablet TAKE 1 TABLET BY MOUTH DAILY 90 tablet 0    benzonatate (TESSALON) 200 MG capsule Take 1 capsule by mouth 3 (Three) Times a Day As Needed for Cough. 30 capsule 0    buPROPion XL (WELLBUTRIN XL) 150 MG 24 hr tablet TAKE 1 TABLET BY MOUTH DAILY IN THE MORNING 90 tablet 1    Cholecalciferol (VITAMIN D3) 25 MCG (1000 UT) capsule Take  by mouth.      levothyroxine (SYNTHROID, LEVOTHROID) 100 MCG tablet Take 1 tablet by mouth Daily. 90 tablet 1    losartan-hydrochlorothiazide (HYZAAR) 100-25 MG per tablet TAKE 1 TABLET BY MOUTH DAILY 90 tablet 3    fluticasone (FLONASE) 50 MCG/ACT nasal spray 2 sprays into the nostril(s) as directed by provider Daily As Needed for Allergies. 16 g 5    levocetirizine (XYZAL) 5 MG tablet Take 1 tablet by mouth Daily As Needed for Allergies. 90 tablet 0    azithromycin (Zithromax Z-Koby) 250 MG tablet Take 2 tablets the first day, then 1 tablet daily for 4 days. 6 tablet 0    methylPREDNISolone (MEDROL) 4 MG dose pack Take as directed on  package instructions. (Patient not taking: Reported on 5/30/2025) 21 tablet 0     No facility-administered medications prior to visit.       Immunization History   Administered Date(s) Administered    COVID-19 (PFIZER) 12YRS+ (COMIRNATY) 02/25/2025    COVID-19 (PFIZER) BIVALENT 12+YRS 12/12/2022    COVID-19 (PFIZER) Purple Cap Monovalent 02/24/2021, 03/17/2021, 10/27/2021    Covid-19 (Pfizer) Gray Cap Monovalent 04/22/2022    Flu Vaccine Quad PF >36MO 01/03/2018    Fluzone (or Fluarix & Flulaval for VFC) >6mos 10/18/2019, 11/19/2020, 12/12/2022    Hepatitis A 04/15/2019, 10/18/2019    Influenza, Unspecified 12/12/2022    PPD Test 02/08/2019    Tdap 03/23/2011, 08/25/2021         Patient Active Problem List   Diagnosis    Anxiety disorder    Diverticulosis of large intestine    Abnormal liver enzymes    Gastroesophageal reflux disease    Hyperlipidemia    Hypertension    Hypothyroidism    Lung nodule    Vitamin D deficiency       Health Habits:  Dental Exam. up to date  Eye Exam. up to date  Hearing Loss:  No  Exercise: 3 times/week.  Current exercise activities include: walking  Diet: 3/4 Healthy  Multivitamin: No    Safe Driving:  Yes  Seat Belt:  Yes  Bike Helmet:  N/A  Skin Screening:  Yes  Sunscreen: Yes  SBE / MATIAS: Yes  Sexual Activity:  Yes  Birth Control:  Menopause  STD Prevention:  N/A    Last Pap: N/A (MO/BSO).  Last Mammogram: 12/7/2022, category 1.  Last DEXA Scan: 11/27/2020, normal.  Last Colonoscopy: 12/4/2023, pandiverticulosis and internal hemorrhoids, otherwise normal.  Last PSA: N/A.    Social:    Social History     Socioeconomic History    Marital status:     Number of children: 2   Tobacco Use    Smoking status: Never     Passive exposure: Past (childhood)    Smokeless tobacco: Never   Vaping Use    Vaping status: Never Used   Substance and Sexual Activity    Alcohol use: Yes     Comment: 2-3 glasses of wine weekends    Drug use: No    Sexual activity: Yes     Partners: Male     Birth  control/protection: Surgical         Current Medical Providers:    Maia Melton MD (Internal Medicine / Pediatrics)    The [facility] providers who are involved in the care of this patient are listed above.         Review of Systems   Constitutional:  Negative for appetite change, chills, fatigue, fever and unexpected weight change.        No night sweats.    HENT:  Positive for congestion, postnasal drip, rhinorrhea and sneezing. Negative for ear discharge, ear pain, facial swelling, hearing loss, nosebleeds, sinus pressure, sinus pain, sore throat, tinnitus and voice change.         Denies snoring.   Eyes:  Positive for pain, discharge, redness and itching. Negative for photophobia and visual disturbance.   Respiratory:  Positive for cough. Negative for chest tightness, shortness of breath and wheezing.         No chest congestion.  No hemoptysis.   Cardiovascular:  Negative for chest pain, palpitations and leg swelling.        No orthopnea, PORTILLO, or PND.  No claudication or syncope.   Gastrointestinal:  Negative for abdominal pain, blood in stool, constipation, diarrhea, nausea, rectal pain and vomiting.        No melena.  No hematemesis.  No heartburn, dysphagia or odynophagia.  No early satiety, belching, or bloating.    Endocrine: Negative for cold intolerance, heat intolerance, polydipsia, polyphagia and polyuria.        No hair loss or dry skin.  No hot flashes.     Genitourinary:  Negative for difficulty urinating, dyspareunia, dysuria, flank pain, frequency, hematuria, pelvic pain, urgency, vaginal bleeding and vaginal discharge.        No nocturia, incomplete emptying, or incontinence.   Musculoskeletal:  Positive for arthralgias, back pain, neck pain and neck stiffness. Negative for gait problem, joint swelling and myalgias.        No joint stiffness.   Skin:  Negative for rash.        No new skin lesions or changes in skin lesions. No breast pain or masses.  No nipple discharge or nipple inversion.    Allergic/Immunologic: Positive for environmental allergies.   Neurological:  Positive for dizziness and light-headedness. Negative for tremors, syncope, speech difficulty, weakness, numbness and headaches.        No tingling.  No memory loss.  No decreased concentration.   Hematological:  Negative for adenopathy. Does not bruise/bleed easily.   Psychiatric/Behavioral:  Negative for confusion, self-injury, sleep disturbance and suicidal ideas. The patient is nervous/anxious.         No depression.           Objective     Vitals:    05/30/25 0909   BP: 148/90   BP Location: Right arm   Patient Position: Sitting   Cuff Size: Adult   Pulse: 60   Resp: 10   Temp: 97.1 °F (36.2 °C)   TempSrc: Infrared   Weight: 78.5 kg (173 lb)   PainSc: 0-No pain       Body mass index is 28.45 kg/m².    Physical Exam  Vitals and nursing note reviewed.   Constitutional:       Appearance: Normal appearance. She is well-developed and normal weight.   HENT:      Head: Normocephalic and atraumatic.      Right Ear: Tympanic membrane, ear canal and external ear normal.      Left Ear: Tympanic membrane, ear canal and external ear normal.      Mouth/Throat:      Mouth: Mucous membranes are moist. No oral lesions.      Pharynx: Oropharynx is clear.      Comments: Tonsils normal.  Eyes:      Extraocular Movements: Extraocular movements intact.      Conjunctiva/sclera: Conjunctivae normal.      Pupils: Pupils are equal, round, and reactive to light.      Comments: Fundi normal bilaterally.   Neck:      Thyroid: No thyromegaly.      Vascular: No carotid bruit.   Cardiovascular:      Rate and Rhythm: Normal rate and regular rhythm.      Pulses: Normal pulses.      Heart sounds: Normal heart sounds. No murmur heard.     No friction rub. No gallop.   Pulmonary:      Effort: Pulmonary effort is normal.      Breath sounds: Normal breath sounds.   Chest:   Breasts:     Right: No inverted nipple, mass, nipple discharge, skin change or tenderness.       Left: No inverted nipple, mass, nipple discharge, skin change or tenderness.   Abdominal:      General: Bowel sounds are normal. There is no distension or abdominal bruit.      Palpations: Abdomen is soft. There is no hepatomegaly, splenomegaly or mass.      Tenderness: There is no abdominal tenderness.   Genitourinary:     Comments:  and rectal exam deferred.  Musculoskeletal:         General: Normal range of motion.      Cervical back: Normal range of motion and neck supple.      Right lower leg: No edema.      Left lower leg: No edema.   Lymphadenopathy:      Cervical: No cervical adenopathy.      Upper Body:      Right upper body: No supraclavicular or axillary adenopathy.      Left upper body: No supraclavicular or axillary adenopathy.   Skin:     Findings: No rash.      Comments: No atypical skin lesions.   Neurological:      Mental Status: She is alert.      Cranial Nerves: No cranial nerve deficit.      Motor: Motor function is intact. No abnormal muscle tone.      Coordination: Coordination is intact.      Gait: Gait is intact.      Deep Tendon Reflexes: Reflexes are normal and symmetric.   Psychiatric:         Mood and Affect: Mood normal.         PHQ-2 Depression Screening  Little interest or pleasure in doing things? Not at all   Feeling down, depressed, or hopeless? Not at all   PHQ-2 Total Score 0           Counseling was given to patient for the following topics: appropriate exercise, healthy eating habits, disease prevention, risk factors for cancer, importance of self breast exam and breast health, importance of immunizations, including risks and benefits, sun safety, seatbelt use, and safe driving. Also discussed the importance of regular dental and vision care, as well recommendation for a yearly screening skin exam after age 40.  Written information provided to patient on these topics and other health maintenance issues.        Diagnoses and all orders for this visit:    1. Encounter for health  maintenance examination in adult (Primary)    2. Primary hypertension  -     metoprolol succinate XL (Toprol XL) 25 MG 24 hr tablet; Take 1 tablet by mouth Daily.  Dispense: 30 tablet; Refill: 5- NEW   Continue current medication(s) as noted in the history of present illness.  -     Lipid Panel  -     Comprehensive Metabolic Panel  -     TSH  -     CBC & Differential   The patient agrees to call or send a Triage email message in 2-3 weeks with an update on blood pressure readings on the new medication.    3. Dyslipidemia  -     Lipid Panel  -     Comprehensive Metabolic Panel  -     TSH  -     CBC & Differential   Continue current medication(s) as noted in the history of present illness.    4. Acquired hypothyroidism  -     TSH  -     T4, Free  Continue current medication(s) as noted in the history of present illness.    5. Gastroesophageal reflux disease, unspecified whether esophagitis present   Continue current medication(s) as noted in the history of present illness.    6. Vitamin D deficiency  -     Vitamin D,25-Hydroxy   Continue Vitamin D supplementation.    7. Generalized anxiety disorder   Continue current medication(s) as noted in the history of present illness.    8. Seasonal allergic rhinitis, unspecified trigger  -     fluticasone (FLONASE) 50 MCG/ACT nasal spray; Administer 2 sprays into the nostril(s) as directed by provider Daily As Needed for Allergies.  Dispense: 48 g; Refill: 3- NEW  -     levocetirizine (XYZAL) 5 MG tablet; Take 1 tablet by mouth Daily As Needed for Allergies.  Dispense: 90 tablet; Refill: 3- NEW    9. Neck pain, chronic  -     cyclobenzaprine (FLEXERIL) 10 MG tablet; Take 1 tablet by mouth At Night As Needed for Muscle Spasms.  Dispense: 30 tablet; Refill: 1- NEW    10. Chronic cough   Allergy medication as above.  Will investigate further if no improvement.    11. Menopause  -     DEXA Bone Density Axial; Future    12. Encounter for screening mammogram for breast cancer  -      Mammo Screening Digital Tomosynthesis Bilateral With CAD; Future    13.  Need for vaccination for pneumococcus  -     Pneumococcal Conjugate Vaccine 20-Valent All      I recommended Shingrix (Shingles vaccine) at the pharmacy.      BMI is >= 25 and <30. (Overweight) The following options were offered after discussion;: exercise counseling/recommendations and nutrition counseling/recommendations            Return in about 6 months (around 11/30/2025) for Recheck HTN, fasting.

## 2025-05-30 NOTE — LETTER
[facility]  Vaccine Consent Form    Patient Name:  Roz Randolph  Patient :  1960  [employer] EM[employer]  Subscriber:  Roz Randolph  Subscriber ID:[id number]  Relationship:Self     Vaccine(s) Ordered    Pneumococcal Conjugate Vaccine 20-Valent All        Screening Checklist  The following questions should be completed prior to vaccination. If you answer “yes” to any question, it does not necessarily mean you should not be vaccinated. It just means we may need to clarify or ask more questions. If a question is unclear, please ask your healthcare provider to explain it.    Yes No   Any fever or moderate to severe illness today (mild illness and/or antibiotic treatment are not contraindications)?     Do you have a history of a serious reaction to any previous vaccinations, such as anaphylaxis, encephalopathy within 7 days, Guillain-[place] syndrome within 6 weeks, seizure?     Have you received any live vaccine(s) (e.g MMR, JHONY) or any other vaccines in the last month (to ensure duplicate doses aren't given)?     Do you have an anaphylactic allergy to latex (DTaP, DTaP-IPV, Hep A, Hep B, MenB, RV, Td, Tdap), baker’s yeast (Hep B, HPV), polysorbates (RSV, nirsevimab, PCV 20, Rotavirrus, Tdap, Shingrix), or gelatin (JHONY, MMR)?     Do you have an anaphylactic allergy to neomycin (Rabies, JHONY, MMR, IPV, Hep A), polymyxin B (IPV), or streptomycin (IPV)?      Any cancer, leukemia, AIDS, or other immune system disorder? (JHONY, MMR, RV)     Do you have a parent, brother, or sister with an immune system problem (if immune competence of vaccine recipient clinically verified, can proceed)? (MMR, JHONY)     Any recent steroid treatments for >2 weeks, chemotherapy, or radiation treatment? (JHONY, MMR)     Have you received antibody-containing blood transfusions or IVIG in the past 11 months (recommended interval is dependent on product)? (MMR, JHONY)     Have you taken antiviral drugs  "(acyclovir, famciclovir, valacyclovir for JHONY) in the last 24 or 48 hours, respectively?      Are you pregnant or planning to become pregnant within 1 month? (JHONY, MMR, HPV, IPV, MenB, Abrexvy; For Hep B- refer to Engerix-B; For RSV - Abrysvo is indicated for 32-36 weeks of pregnancy from September to January)     For infants, have you ever been told your child has had intussusception or a medical emergency involving obstruction of the intestine (Rotavirus)? If not for an infant, can skip this question.         *Ordering Physicians/APC should be consulted if \"yes\" is checked by the patient or guardian above.  I have received, read, and understand the Vaccine Information Statement (VIS) for each vaccine ordered.  I have considered my or my child's health status as well as the health status of my close contacts.  I have taken the opportunity to discuss my vaccine questions with my or my child's health care provider.   I have requested that the ordered vaccine(s) be given to me or my child.  I understand the benefits and risks of the vaccines.  I understand that I should remain in the clinic for 15 minutes after receiving the vaccine(s).  _________________________________________________________  Signature of Patient or Parent/Legal Guardian ____________________  Date   "

## 2025-05-30 NOTE — PATIENT INSTRUCTIONS
Please call or send a [employer] email message in 2-3 weeks with an update on blood pressure readings on the new medication.    I recommend Shingrix (Shingles vaccine) at the pharmacy, as we discussed.    Health Maintenance for Postmenopausal Women  Menopause is a normal process in which your ability to get pregnant comes to an end. This process happens slowly over many months or years, usually between the ages of 48 and 55. Menopause is complete when you have missed your menstrual period for 12 months.  It is important to talk with your health care provider about some of the most common conditions that affect women after menopause (postmenopausal women). These include heart disease, cancer, and bone loss (osteoporosis). Adopting a healthy lifestyle and getting preventive care can help to promote your health and wellness. The actions you take can also lower your chances of developing some of these common conditions.  What are the signs and symptoms of menopause?  During menopause, you may have the following symptoms:  Hot flashes. These can be moderate or severe.  Night sweats.  Decrease in sex drive.  Mood swings.  Headaches.  Tiredness (fatigue).  Irritability.  Memory problems.  Problems falling asleep or staying asleep.  Talk with your health care provider about treatment options for your symptoms.  Do I need hormone replacement therapy?  Hormone replacement therapy is effective in treating symptoms that are caused by menopause, such as hot flashes and night sweats.  Hormone replacement carries certain risks, especially as you become older. If you are thinking about using estrogen or estrogen with progestin, discuss the benefits and risks with your health care provider.  How can I reduce my risk for heart disease and stroke?  The risk of heart disease, heart attack, and stroke increases as you age. One of the causes may be a change in the body's hormones during menopause. This can affect how your body uses dietary  fats, triglycerides, and cholesterol. Heart attack and stroke are medical emergencies. There are many things that you can do to help prevent heart disease and stroke.  Watch your blood pressure  High blood pressure causes heart disease and increases the risk of stroke. This is more likely to develop in people who have high blood pressure readings or are overweight.  Have your blood pressure checked:  Every 3-5 years if you are 18-39 years of age.  Every year if you are 40 years old or older.  Eat a healthy diet    Eat a diet that includes plenty of vegetables, fruits, low-fat dairy products, and lean protein.  Do not eat a lot of foods that are high in solid fats, added sugars, or sodium.  Get regular exercise  Get regular exercise. This is one of the most important things you can do for your health. Most adults should:  Try to exercise for at least 150 minutes each week. The exercise should increase your heart rate and make you sweat (moderate-intensity exercise).  Try to do strengthening exercises at least twice each week. Do these in addition to the moderate-intensity exercise.  Spend less time sitting. Even light physical activity can be beneficial.  Other tips  Work with your health care provider to achieve or maintain a healthy weight.  Do not use any products that contain nicotine or tobacco. These products include cigarettes, chewing tobacco, and vaping devices, such as e-cigarettes. If you need help quitting, ask your health care provider.  Know your numbers. Ask your health care provider to check your cholesterol and your blood sugar (glucose). Continue to have your blood tested as directed by your health care provider.  Do I need screening for cancer?  Depending on your health history and family history, you may need to have cancer screenings at different stages of your life. This may include screening for:  Breast cancer.  Cervical cancer.  Lung cancer.  Colorectal cancer.  What is my risk for  osteoporosis?  After menopause, you may be at increased risk for osteoporosis. Osteoporosis is a condition in which bone destruction happens more quickly than new bone creation. To help prevent osteoporosis or the bone fractures that can happen because of osteoporosis, you may take the following actions:  If you are 19-50 years old, get at least 1,000 mg of calcium and at least 600 international units (IU) of vitamin D per day.  If you are older than age 50 but younger than age 70, get at least 1,200 mg of calcium and at least 600 international units (IU) of vitamin D per day.  If you are older than age 70, get at least 1,200 mg of calcium and at least 800 international units (IU) of vitamin D per day.  Smoking and drinking excessive alcohol increase the risk of osteoporosis. Eat foods that are rich in calcium and vitamin D, and do weight-bearing exercises several times each week as directed by your health care provider.  How does menopause affect my mental health?  Depression may occur at any age, but it is more common as you become older. Common symptoms of depression include:  Feeling depressed.  Changes in sleep patterns.  Changes in appetite or eating patterns.  Feeling an overall lack of motivation or enjoyment of activities that you previously enjoyed.  Frequent crying spells.  Talk with your health care provider if you think that you are experiencing any of these symptoms.  General instructions  See your health care provider for regular wellness exams and vaccines. This may include:  Scheduling regular health, dental, and eye exams.  Getting and maintaining your vaccines. These include:  Influenza vaccine. Get this vaccine each year before the flu season begins.  Pneumonia vaccine.  Shingles vaccine.  Tetanus, diphtheria, and pertussis (Tdap) booster vaccine.  Your health care provider may also recommend other immunizations.  Tell your health care provider if you have ever been abused or do not feel safe at  home.  Summary  Menopause is a normal process in which your ability to get pregnant comes to an end.  This condition causes hot flashes, night sweats, decreased interest in sex, mood swings, headaches, or lack of sleep.  Treatment for this condition may include hormone replacement therapy.  Take actions to keep yourself healthy, including exercising regularly, eating a healthy diet, watching your weight, and checking your blood pressure and blood sugar levels.  Get screened for cancer and depression. Make sure that you are up to date with all your vaccines.  This information is not intended to replace advice given to you by your health care provider. Make sure you discuss any questions you have with your health care provider.  Document Revised: 05/09/2022 Document Reviewed: 05/09/2022  [employer] Patient Education © 2024 [employer] Inc.    MyPlate from [employer]    MyPlate is an outline of a general healthy diet based on the Dietary Guidelines for Americans, 8507-3123, from the U.S. Department of Agriculture (USDA). It sets guidelines for how much food you should eat from each food group based on your age, sex, and level of physical activity.  What are tips for following MyPlate?  To follow MyPlate recommendations:  Eat a wide variety of fruits and vegetables, grains, and protein foods.  Serve smaller portions and eat less food throughout the day.  Limit portion sizes to avoid overeating.  Enjoy your food.  Get at least 150 minutes of exercise every week. This is about 30 minutes each day, 5 or more days per week.  It can be difficult to have every meal look like MyPlate. Think about MyPlate as eating guidelines for an entire day, rather than each individual meal.  Fruits and vegetables  Make one half of your plate fruits and vegetables.  Eat many different colors of fruits and vegetables each day.  For a 2,000-calorie daily food plan, eat:  2½ cups of vegetables every day.  2 cups of fruit every day.  1 cup is equal to:  1  cup raw or cooked vegetables.  1 cup raw fruit.  1 medium-sized orange, apple, or banana.  1 cup 100% fruit or vegetable juice.  2 cups raw leafy greens, such as lettuce, spinach, or kale.  ½ cup dried fruit.  Grains  One fourth of your plate should be grains.  Make at least half of the grains you eat each day whole grains.  For a 2,000-calorie daily food plan, eat 6 oz of grains every day.  1 oz is equal to:  1 slice bread.  1 cup cereal.  ½ cup cooked rice, cereal, or pasta.  Protein  One fourth of your plate should be protein.  Eat a wide variety of protein foods, including meat, poultry, fish, eggs, beans, nuts, and tofu.  For a 2,000-calorie daily food plan, eat 5½ oz of protein every day.  1 oz is equal to:  1 oz meat, poultry, or fish.  ¼ cup cooked beans.  1 egg.  ½ oz nuts or seeds.  1 Tbsp peanut butter.  Dairy  Drink fat-free or low-fat (1%) milk.  Eat or drink dairy as a side to meals.  For a 2,000-calorie daily food plan, eat or drink 3 cups of dairy every day.  1 cup is equal to:  1 cup milk, yogurt, cottage cheese, or soy milk (soy beverage).  2 oz processed cheese.  1½ oz natural cheese.  Fats, oils, salt, and sugars  Only small amounts of oils are recommended.  Avoid foods that are high in calories and low in nutritional value (empty calories), like foods high in fat or added sugars.  Choose foods that are low in salt (sodium). Choose foods that have less than 140 milligrams (mg) of sodium per serving.  Drink water instead of sugary drinks. Drink enough fluid to keep your urine pale yellow.  Where to find support  Work with your health care provider or a dietitian to develop a customized eating plan that is right for you.  Download an [employer] (mobile application) to help you track your daily food intake.  Where to find more information  USDA: ChooseMyPlate.gov  Summary  MyPlate is a general guideline for healthy eating from the USDA. It is based on the Dietary Guidelines for Americans, 2327-3154.  In  general, fruits and vegetables should take up one half of your plate, grains should take up one fourth of your plate, and protein should take up one fourth of your plate.  This information is not intended to replace advice given to you by your health care provider. Make sure you discuss any questions you have with your health care provider.  Document Revised: 11/08/2021 Document Reviewed: 11/08/2021  [employer] Patient Education © 2024 [employer]    Exercising to Lose Weight  Getting regular exercise is important for everyone. It is especially important if you are overweight. Being overweight increases your risk of heart disease, stroke, diabetes, high blood pressure, and several types of cancer. Exercising, and reducing the calories you consume, can help you lose weight and improve fitness and health.  Exercise can be moderate or vigorous intensity. To lose weight, most people need to do a certain amount of moderate or vigorous-intensity exercise each week.  How can exercise affect me?  You lose weight when you exercise enough to burn more calories than you eat. Exercise also reduces body fat and builds muscle. The more muscle you have, the more calories you burn. Exercise also:  Improves mood.  Reduces stress and tension.  Improves your overall fitness, flexibility, and endurance.  Increases bone strength.  Moderate-intensity exercise    Moderate-intensity exercise is any activity that gets you moving enough to burn at least three times more energy (calories) than if you were sitting.  Examples of moderate exercise include:  Walking a mile in 15 minutes.  Doing light yard work.  Biking at an easy pace.  Most people should get at least 150 minutes of moderate-intensity exercise a week to maintain their body weight.  Vigorous-intensity exercise  Vigorous-intensity exercise is any activity that gets you moving enough to burn at least six times more calories than if you were sitting. When you exercise at this  intensity, you should be working hard enough that you are not able to carry on a conversation.  Examples of vigorous exercise include:  Running.  Playing a team sport, such as football, basketball, and soccer.  Jumping rope.  Most people should get at least 75 minutes a week of vigorous exercise to maintain their body weight.  What actions can I take to lose weight?  The amount of exercise you need to lose weight depends on:  Your age.  The type of exercise.  Any health conditions you have.  Your overall physical ability.  Talk to your health care provider about how much exercise you need and what types of activities are safe for you.  Nutrition    Make changes to your diet as told by your health care provider or diet and nutrition specialist (dietitian). This may include:  Eating fewer calories.  Eating more protein.  Eating less unhealthy fats.  Eating a diet that includes fresh fruits and vegetables, whole grains, low-fat dairy products, and lean protein.  Avoiding foods with added fat, salt, and sugar.  Drink plenty of water while you exercise to prevent dehydration or heat stroke.  Activity  Choose an activity that you enjoy and set realistic goals. Your health care provider can help you make an exercise plan that works for you.  Exercise at a moderate or vigorous intensity most days of the week.  The intensity of exercise may vary from person to person. You can tell how intense a workout is for you by paying attention to your breathing and heartbeat. Most people will notice their breathing and heartbeat get faster with more intense exercise.  Do resistance training twice each week, such as:  Push-ups.  Sit-ups.  Lifting weights.  Using resistance bands.  Getting short amounts of exercise can be just as helpful as long, structured periods of exercise. If you have trouble finding time to exercise, try doing these things as part of your daily routine:  Get up, stretch, and walk around every 30 minutes throughout  the day.  Go for a walk during your lunch break.  Park your car farther away from your destination.  If you take public transportation, get off one stop early and walk the rest of the way.  Make phone calls while standing up and walking around.  Take the stairs instead of elevators or escalators.  Wear comfortable clothes and shoes with good support.  Do not exercise so much that you hurt yourself, feel dizzy, or get very short of breath.  Where to find more information  [place] Department of Health and Human Services: www.hhs.gov  Centers for Disease Control and Prevention: www.cdc.gov  Contact a health care provider:  Before starting a new exercise program.  If you have questions or concerns about your weight.  If you have a medical problem that keeps you from exercising.  Get help right away if:  You have any of the following while exercising:  Injury.  Dizziness.  Difficulty breathing or shortness of breath that does not go away when you stop exercising.  Chest pain.  Rapid heartbeat.  These symptoms may represent a serious problem that is an emergency. Do not wait to see if the symptoms will go away. Get medical help right away. Call your local emergency services (911 in the [place] Do not drive yourself to the hospital.  Summary  Getting regular exercise is especially important if you are overweight.  Being overweight increases your risk of heart disease, stroke, diabetes, high blood pressure, and several types of cancer.  Losing weight happens when you burn more calories than you eat.  Reducing the amount of calories you eat, and getting regular moderate or vigorous exercise each week, helps you lose weight.  This information is not intended to replace advice given to you by your health care provider. Make sure you discuss any questions you have with your health care provider.  Document Revised: 02/13/2022 Document Reviewed: 02/13/2022  Elsevier Patient Education © 2024 Elsevier Inc.

## 2025-06-02 ENCOUNTER — RESULTS FOLLOW-UP (OUTPATIENT)
Dept: INTERNAL MEDICINE | Facility: CLINIC | Age: 65
End: 2025-06-02
Payer: COMMERCIAL

## 2025-06-02 NOTE — LETTER
Roz Randolph  [address] [facility] [id number]    June 2, 2025     Dear Ms. Randolph:    Below are the results from your recent visit:    Resulted Orders   Lipid Panel   Result Value Ref Range    Total Cholesterol 184 0 - 200 mg/dL    Triglycerides 92 0 - 150 mg/dL    HDL Cholesterol 57 40 - 60 mg/dL    LDL Cholesterol  110 (H) 0 - 100 mg/dL    VLDL Cholesterol 17 5 - 40 mg/dL    LDL/HDL Ratio 1.91    Comprehensive Metabolic Panel   Result Value Ref Range    Glucose 97 65 - 99 mg/dL    BUN 12.0 8.0 - 23.0 mg/dL    Creatinine 0.94 0.57 - 1.00 mg/dL    Sodium 138 136 - 145 mmol/L    Potassium 4.0 3.5 - 5.2 mmol/L    Chloride 100 98 - 107 mmol/L    CO2 25.5 22.0 - 29.0 mmol/L    Calcium 9.3 8.6 - 10.5 mg/dL    Total Protein 7.5 6.0 - 8.5 g/dL    Albumin 4.6 3.5 - 5.2 g/dL    ALT (SGPT) 62 (H) 1 - 33 U/L    AST (SGOT) 61 (H) 1 - 32 U/L    Alkaline Phosphatase 54 39 - 117 U/L    Total Bilirubin 0.7 0.0 - 1.2 mg/dL    Globulin 2.9 gm/dL    A/G Ratio 1.6 g/dL    BUN/Creatinine Ratio 12.8 7.0 - 25.0    Anion Gap 12.5 5.0 - 15.0 mmol/L    eGFR 68.7 >60.0 mL/min/1.73   TSH   Result Value Ref Range    TSH 1.390 0.270 - 4.200 uIU/mL   Vitamin D,25-Hydroxy   Result Value Ref Range    25 Hydroxy, Vitamin D 19.4 (L) 30.0 - 100.0 ng/ml   T4, Free   Result Value Ref Range    Free T4 1.23 0.92 - 1.68 ng/dL   CBC Auto Differential   Result Value Ref Range    WBC 7.48 3.40 - 10.80 10*3/mm3    RBC 4.21 3.77 - 5.28 10*6/mm3    Hemoglobin 14.3 12.0 - 15.9 g/dL    Hematocrit 41.5 34.0 - 46.6 %    MCV 98.6 (H) 79.0 - 97.0 fL    MCH 34.0 (H) 26.6 - 33.0 pg    MCHC 34.5 31.5 - 35.7 g/dL    RDW 11.5 (L) 12.3 - 15.4 %    RDW-SD 40.9 37.0 - 54.0 fl    MPV 10.4 6.0 - 12.0 fL    Platelets 253 140 - 450 10*3/mm3    Neutrophil % 63.1 42.7 - 76.0 %    Lymphocyte % 23.4 19.6 - 45.3 %    Monocyte % 8.7 5.0 - 12.0 %    Eosinophil % 4.1 0.3 - 6.2 %    Basophil % 0.4 0.0 - 1.5 %    Immature Grans % 0.3 0.0 - 0.5 %    Neutrophils, Absolute 4.72  1.70 - 7.00 10*3/mm3    Lymphocytes, Absolute 1.75 0.70 - 3.10 10*3/mm3    Monocytes, Absolute 0.65 0.10 - 0.90 10*3/mm3    Eosinophils, Absolute 0.31 0.00 - 0.40 10*3/mm3    Basophils, Absolute 0.03 0.00 - 0.20 10*3/mm3    Immature Grans, Absolute 0.02 0.00 - 0.05 10*3/mm3    nRBC 0.0 0.0 - 0.2 /100 WBC       Labs show AST and ALT (liver tests) are borderline elevated, but stable.  We will continue to monitor this.    Vitamin D level is low.  I recommend taking Vitamin D3, 2008-4736 international units daily.    Otherwise, labs look good.  Please continue your current medication and plan..     If you have any questions or concerns, please don't hesitate to call.         Sincerely,        Maia Melton MD

## 2025-06-13 DIAGNOSIS — E03.9 ACQUIRED HYPOTHYROIDISM: ICD-10-CM

## 2025-06-13 RX ORDER — LEVOTHYROXINE SODIUM 100 UG/1
100 TABLET ORAL DAILY
Qty: 90 TABLET | Refills: 1 | Status: SHIPPED | OUTPATIENT
Start: 2025-06-13

## 2025-06-18 DIAGNOSIS — E03.9 ACQUIRED HYPOTHYROIDISM: ICD-10-CM

## 2025-06-18 RX ORDER — LEVOTHYROXINE SODIUM 100 UG/1
100 TABLET ORAL DAILY
Qty: 90 TABLET | Refills: 1 | OUTPATIENT
Start: 2025-06-18

## 2025-07-15 ENCOUNTER — TELEPHONE (OUTPATIENT)
Dept: INTERNAL MEDICINE | Facility: CLINIC | Age: 65
End: 2025-07-15
Payer: COMMERCIAL

## 2025-07-15 NOTE — TELEPHONE ENCOUNTER
Patient's  called. Patient is having low Blood Pressure, as low as 80/50. It gets worse in the evening and she feels week and tired. This has been going on for at least 2 weeks.   Call: [phone]

## 2025-07-16 ENCOUNTER — OFFICE VISIT (OUTPATIENT)
Dept: INTERNAL MEDICINE | Facility: CLINIC | Age: 65
End: 2025-07-16
Payer: COMMERCIAL

## 2025-07-16 VITALS
TEMPERATURE: 98 F | BODY MASS INDEX: 28.45 KG/M2 | HEART RATE: 76 BPM | RESPIRATION RATE: 18 BRPM | SYSTOLIC BLOOD PRESSURE: 138 MMHG | WEIGHT: 173 LBS | DIASTOLIC BLOOD PRESSURE: 82 MMHG

## 2025-07-16 DIAGNOSIS — I10 PRIMARY HYPERTENSION: Primary | ICD-10-CM

## 2025-07-16 DIAGNOSIS — R06.00 NOCTURNAL DYSPNEA: ICD-10-CM

## 2025-07-16 DIAGNOSIS — E78.5 DYSLIPIDEMIA: ICD-10-CM

## 2025-07-16 DIAGNOSIS — R04.0 EPISTAXIS: ICD-10-CM

## 2025-07-16 PROCEDURE — 99214 OFFICE O/P EST MOD 30 MIN: CPT | Performed by: INTERNAL MEDICINE

## 2025-07-16 RX ORDER — LOSARTAN POTASSIUM AND HYDROCHLOROTHIAZIDE 12.5; 5 MG/1; MG/1
1 TABLET ORAL DAILY
Qty: 30 TABLET | Refills: 0 | Status: SHIPPED | OUTPATIENT
Start: 2025-07-16

## 2025-07-16 RX ORDER — ATORVASTATIN CALCIUM 10 MG/1
10 TABLET, FILM COATED ORAL DAILY
Qty: 90 TABLET | Refills: 3 | Status: SHIPPED | OUTPATIENT
Start: 2025-07-16

## 2025-07-16 NOTE — PROGRESS NOTES
Subjective       Roz Randolph is a 64 y.o. female.     Chief Complaint   Patient presents with    Hypotension       History obtained from the patient.      History of Present Illness   History of Present Illness  The patient has Hypertension, which is unstable.      The patient presents for evaluation of low blood pressure readings at home.    She has been monitoring her blood pressure at home, as advised during her last visit. She has observed that her blood pressure tends to be low, particularly at night, which occasionally results in lightheadedness. Her blood pressure readings vary throughout the day, with systolic values ranging from 99 to 122 and diastolic values between 60 and 78. The lowest recorded reading was 87/47. She reports no chest pain or other symptoms associated with these episodes. She also reports no blurred vision, double vision, headaches, fatigue, palpitations, memory or concentration problems, muscle aches, joint aches, excessive thirst, excessive urination, excessive hunger, cough, wheezing, gastrointestinal issues, abdominal pain, nausea, vomiting, diarrhea, blood in the stool, or black stool. She is currently on Losartan HCTZ  100/25 mg in the morning and Toprol-XL 25 mg at night.    Over the past few months, she has experienced approximately 5 episodes of waking up gasping for breath, which resolve spontaneously. These episodes have been more frequent recently, with the most recent one lasting longer than usual. She does not feel anxious during these episodes.    She has also noticed occasional nosebleeds, which have occurred about 8 to 10 times over the past several months. She attributes this to her allergies and the use of allergy medication.    Results  Labs   - Blood Count: 05/30/2025, Normal    The following portions of the patient's history were reviewed and updated as appropriate: allergies, current medications, past family history, past medical history, past social history,  past surgical history, and problem list.      Review of Systems   Constitutional:  Positive for fatigue. Negative for unexpected weight change.   HENT:  Positive for nosebleeds.    Eyes:  Negative for visual disturbance.   Respiratory:  Negative for cough, shortness of breath and wheezing.    Cardiovascular:  Negative for chest pain, palpitations and leg swelling.        No PORTILLO, orthopnea, or claudication.   Gastrointestinal:  Negative for abdominal pain, blood in stool, constipation, diarrhea, nausea and vomiting.        Denies melena.   Endocrine: Negative for polydipsia, polyphagia and polyuria.   Musculoskeletal:  Negative for arthralgias and myalgias.   Neurological:  Positive for light-headedness. Negative for dizziness, syncope and headaches.        No memory issues.   Psychiatric/Behavioral:  Negative for decreased concentration.            Objective     Blood pressure 138/82, pulse 76, temperature 98 °F (36.7 °C), temperature source Infrared, resp. rate 18, weight 78.5 kg (173 lb).    Physical Exam  Vitals and nursing note reviewed.   Constitutional:       Appearance: She is well-developed.      Comments: BMI greater than 25   Neck:      Vascular: No carotid bruit.   Cardiovascular:      Rate and Rhythm: Normal rate and regular rhythm.      Pulses: Normal pulses.      Heart sounds: Normal heart sounds. No murmur heard.  Pulmonary:      Effort: Pulmonary effort is normal.      Breath sounds: Normal breath sounds.   Musculoskeletal:      Right lower leg: No edema.      Left lower leg: No edema.   Neurological:      Mental Status: She is alert.   Psychiatric:         Mood and Affect: Mood normal.           Assessment & Plan   Diagnoses and all orders for this visit:    1. Primary hypertension (Primary)  -     losartan-hydrochlorothiazide (Hyzaar) 50-12.5 MG per tablet; Take 1 tablet by mouth Daily.  Dispense: 30 tablet; Refill: 0- DECREASED DOSE   The patient was instructed to continue her current dose of  Toprol-XL.   - Advised to continue monitoring blood pressure at different times of the day and keep a log of the readings.  She agrees to call or send a [employer] email message in 2-3 weeks with an update on symptoms and blood pressure readings.    2. Dyslipidemia  -     atorvastatin (LIPITOR) 10 MG tablet; Take 1 tablet by mouth Daily.  Dispense: 90 tablet; Refill: 3- REFILL    3.  Nocturnal dyspnea   - Advised to monitor symptoms and if they persist or worsen, further evaluation may be necessary.    4.  Epistaxis  - Advised to apply Aquaphor inside both nostrils twice daily for at least two weeks.  - If nosebleeds persist, a referral to an ENT specialist may be considered.      Return for Next scheduled follow up.             Patient or patient representative verbalized consent for the use of Ambient Listening during the visit with  Maia Melton MD for chart documentation. 7/24/2025  15:56 EDT

## 2025-07-16 NOTE — PATIENT INSTRUCTIONS
Please call or send a [employer] email message in 2-3 weeks with an update on symptoms and blood pressure readings.

## 2025-08-15 DIAGNOSIS — I10 PRIMARY HYPERTENSION: ICD-10-CM

## 2025-08-15 RX ORDER — LOSARTAN POTASSIUM AND HYDROCHLOROTHIAZIDE 12.5; 5 MG/1; MG/1
1 TABLET ORAL DAILY
Qty: 30 TABLET | Refills: 0 | Status: SHIPPED | OUTPATIENT
Start: 2025-08-15